# Patient Record
Sex: MALE | Race: WHITE | Employment: OTHER | URBAN - NONMETROPOLITAN AREA
[De-identification: names, ages, dates, MRNs, and addresses within clinical notes are randomized per-mention and may not be internally consistent; named-entity substitution may affect disease eponyms.]

---

## 2021-03-22 ENCOUNTER — OFFICE VISIT (OUTPATIENT)
Dept: UROLOGY | Age: 86
End: 2021-03-22
Payer: OTHER GOVERNMENT

## 2021-03-22 VITALS
SYSTOLIC BLOOD PRESSURE: 115 MMHG | DIASTOLIC BLOOD PRESSURE: 71 MMHG | WEIGHT: 154 LBS | RESPIRATION RATE: 16 BRPM | BODY MASS INDEX: 24.17 KG/M2 | HEART RATE: 79 BPM | TEMPERATURE: 97.2 F | HEIGHT: 67 IN

## 2021-03-22 DIAGNOSIS — N40.1 BENIGN PROSTATIC HYPERPLASIA WITH URINARY RETENTION: Primary | ICD-10-CM

## 2021-03-22 DIAGNOSIS — R33.8 BENIGN PROSTATIC HYPERPLASIA WITH URINARY RETENTION: Primary | ICD-10-CM

## 2021-03-22 PROCEDURE — 99205 OFFICE O/P NEW HI 60 MIN: CPT | Performed by: NURSE PRACTITIONER

## 2021-03-22 PROCEDURE — 99417 PROLNG OP E/M EACH 15 MIN: CPT | Performed by: NURSE PRACTITIONER

## 2021-03-22 NOTE — PROGRESS NOTES
Jade Thompson is a 80 y.o. male who presents today   Chief Complaint   Patient presents with    New Patient     Retention - catheter placed in [address]       Patient presents to the clinic today for evaluation of urinary retention. Patient is unsure if he is seeing a neurologist in the past.  He was seen at [facility] ED with urinary retention on 2/25/2021. They placed a Harper for decompression with approximately 1450ml in return. They discontinued the catheter before discharging from the ED and placed patient on Flomax 0.4 mg daily. Patient then presented to [facility] ED on 3/19/2021 with complaints of urinary retention. Harper catheter was placed with 1000 mL drained. Patient was sent home with Harper catheter and was told to follow-up. Patient is a poor historian. Patient usually sees physician at [facility]. He is unsure what medications he is taking at home. He states prior to catheter placement he voided 3-4 times during the day, nocturia 1 time per night, with moderate stream.  He has a history of diabetes but denies any neuropathy. He can feel when his bladder is full. He denies incomplete emptying, fever, chills, nausea, vomiting, frequency, urgency, dysuria. Patient's medication list indicates he is on Flomax 0.4 mg daily and finasteride 5 mg daily for the past year. Unable to obtain last PSA from [address] [facility]. He is still independent and lives alone, is also hard of hearing. He currently has a catheter that was placed on 3/19/2021 and wants it removed. History reviewed. No pertinent past medical history. History reviewed. No pertinent surgical history.     Current Outpatient Medications   Medication Sig Dispense Refill    tamsulosin (FLOMAX) 0.4 MG capsule Take 0.8 mg by mouth daily Take 2 capsules daily      finasteride (PROSCAR) 5 MG tablet Take 5 mg by mouth daily      metFORMIN (GLUCOPHAGE) 1000 MG tablet Take 1,000 mg by mouth 2 times daily (with meals)       No current facility-administered medications for this visit. Allergies   Allergen Reactions    Pcn [Penicillins]        Social History     Socioeconomic History    Marital status:      Spouse name: None    Number of children: None    Years of education: None    Highest education level: None   Occupational History    None   Social Needs    Financial resource strain: None    Food insecurity     Worry: None     Inability: None    Transportation needs     Medical: None     Non-medical: None   Tobacco Use    Smoking status: Former Smoker     Types: Cigars    Smokeless tobacco: Never Used   Substance and Sexual Activity    Alcohol use: None    Drug use: None    Sexual activity: None   Lifestyle    Physical activity     Days per week: None     Minutes per session: None    Stress: None   Relationships    Social connections     Talks on phone: None     Gets together: None     Attends Protestant service: None     Active member of club or organization: None     Attends meetings of clubs or organizations: None     Relationship status: None    Intimate partner violence     Fear of current or ex partner: None     Emotionally abused: None     Physically abused: None     Forced sexual activity: None   Other Topics Concern    None   Social History Narrative    None       History reviewed. No pertinent family history. REVIEW OF SYSTEMS:  Review of Systems   Constitutional: Negative for appetite change, chills, fever and unexpected weight change. HENT: Negative for congestion and hearing loss. Respiratory: Negative for shortness of breath and wheezing. Cardiovascular: Negative for chest pain. Gastrointestinal: Negative for abdominal distention, abdominal pain, constipation, nausea and vomiting. Genitourinary: Negative for difficulty urinating, dysuria, flank pain, frequency, hematuria, penile pain, testicular pain and urgency. Musculoskeletal: Negative for arthralgias and gait problem.    Skin: Negative for color change. Neurological: Negative for dizziness, syncope, weakness, light-headedness, numbness and headaches. Psychiatric/Behavioral: Negative for behavioral problems. The patient is not nervous/anxious. PHYSICAL EXAM:  /71   Pulse 79   Temp 97.2 °F (36.2 °C)   Resp 16   Ht 5' 7\" (1.702 m)   Wt 154 lb (69.9 kg)   BMI 24.12 kg/m²   Physical Exam  Constitutional:       General: He is not in acute distress. Appearance: Normal appearance. He is not toxic-appearing. HENT:      Head: Normocephalic. Comments: Hard of hearing  Neck:      Musculoskeletal: Normal range of motion. Cardiovascular:      Rate and Rhythm: Normal rate. Pulmonary:      Effort: Pulmonary effort is normal. No respiratory distress. Breath sounds: No wheezing. Abdominal:      General: There is no distension. Palpations: Abdomen is soft. Tenderness: There is no abdominal tenderness. There is no right CVA tenderness or left CVA tenderness. Genitourinary:     Prostate: Enlarged (45g smooth). Not tender and no nodules present. Musculoskeletal: Normal range of motion. Skin:     General: Skin is warm and dry. Neurological:      Mental Status: He is alert and oriented to person, place, and time. Motor: Weakness present. Gait: Gait normal.   Psychiatric:         Mood and Affect: Mood normal.         Behavior: Behavior normal.         IMAGING:  CT obtained on 2/25/2021 from [facility] ED reports a severely distended bladder which may be related to bladder outlet obstruction enlarged prostate. Mild bilateral hydroureteronephrosis. I do not have the disc of some unable to fully evaluate CT. 1. Benign prostatic hyperplasia with urinary retention  Harper catheter replaced on 3/19/2021. Wrote down medications tamsulosin and finasteride, he is to make sure he is taking both of these medications at home.   I will also wrote down to increase Flomax to 0.8 mg and follow-up in 3 days for fill and pull. Will contact [place] for previous PSA results. It is unlikely he has had one recently given his age. Discussed changing to bedside bag at night and leg bag during the day. He acknowledged and was able to verbalize understanding. STANLEY revealed 45 g prostate. If patient fails a voiding trial would recommend TURP. No orders of the defined types were placed in this encounter. Return in about 3 days (around 3/25/2021) for F/U Thurs 0830 for fill and pull . At least 80 minutes were spent on the day of the visit reviewing the patient's past medical records/imaging, speaking face to face with the patient, and charting in the post visit period. Joaquin Wilkins, BRYAN - CNP    All information inputted into the note by the MA to include chief complaint, past medical history, past surgical history, medications, allergies, social and family history and review of systems has been reviewed and updated as needed by me. EMR Dragon/transcription disclaimer: Much of this documentt is electronic  transcription/translation of spoken language to printed text. The  electronic translation of spoken language may be erroneous, or at times,  nonsensical words or phrases may be inadvertently transcribed.  Although I  have reviewed the document for such errors, some may still exist.

## 2021-03-23 RX ORDER — FINASTERIDE 5 MG/1
5 TABLET, FILM COATED ORAL DAILY
Status: ON HOLD | COMMUNITY
End: 2021-04-14

## 2021-03-23 RX ORDER — TAMSULOSIN HYDROCHLORIDE 0.4 MG/1
0.8 CAPSULE ORAL DAILY
Status: ON HOLD | COMMUNITY
End: 2021-04-14

## 2021-03-23 ASSESSMENT — ENCOUNTER SYMPTOMS
ABDOMINAL PAIN: 0
NAUSEA: 0
WHEEZING: 0
CONSTIPATION: 0
VOMITING: 0
ABDOMINAL DISTENTION: 0
COLOR CHANGE: 0
SHORTNESS OF BREATH: 0

## 2021-03-25 ENCOUNTER — OFFICE VISIT (OUTPATIENT)
Dept: UROLOGY | Age: 86
End: 2021-03-25
Payer: OTHER GOVERNMENT

## 2021-03-25 ENCOUNTER — TELEPHONE (OUTPATIENT)
Dept: UROLOGY | Age: 86
End: 2021-03-25

## 2021-03-25 VITALS — TEMPERATURE: 97.2 F | WEIGHT: 155.7 LBS | BODY MASS INDEX: 24.44 KG/M2 | HEIGHT: 67 IN

## 2021-03-25 DIAGNOSIS — R33.8 BENIGN PROSTATIC HYPERPLASIA WITH URINARY RETENTION: Primary | ICD-10-CM

## 2021-03-25 DIAGNOSIS — N40.1 BENIGN PROSTATIC HYPERPLASIA WITH URINARY RETENTION: Primary | ICD-10-CM

## 2021-03-25 PROCEDURE — 51700 IRRIGATION OF BLADDER: CPT | Performed by: NURSE PRACTITIONER

## 2021-03-25 PROCEDURE — 99215 OFFICE O/P EST HI 40 MIN: CPT | Performed by: NURSE PRACTITIONER

## 2021-03-25 ASSESSMENT — ENCOUNTER SYMPTOMS
COLOR CHANGE: 0
ABDOMINAL DISTENTION: 0
ABDOMINAL PAIN: 0
WHEEZING: 0
NAUSEA: 0
SHORTNESS OF BREATH: 0
CONSTIPATION: 0
VOMITING: 0

## 2021-03-25 NOTE — PROGRESS NOTES
Patient of RUDDY Campuzano presents today for voiding trial post episode of retention. The patient denies any fever, chills or  N&V. After patient had given consent, using the urethral catheter in place, 300cc of sterile water was installed into the bladder with no complications. Patient was unable to void. Spoke with APRN, rec verbal orders to insert new granger urtheral catheter. Procedure Note (3/25/21): After receiving verbal orders from RUDDY Campuzano, I was instructed to place urethral granger catheter. Using sterile technique, patient was cleaned with Hibiclens and sterile water solution. A 18f coude urethral catheter was inserted into the bladder, bladder was drained of 300cc of urine, 10 mL of sterile water was used to fill up the balloon. The catheter was then hooked up to a drainage bag. The patient tolerated the procedure well. Patient should follow up as scheduled.

## 2021-03-25 NOTE — PROGRESS NOTES
Fatoumata Astorga is a 80 y.o. male who presents today   Chief Complaint   Patient presents with    Follow-up     I am here today for a voiding trial     Patient presents to the clinic today for fill and pull secondary to BPH with urinary retention. Patient is unsure if he has seen a urologist in the past.  He was seen at [facility] ED with urinary retention on 2/25/2021. They placed a Harper catheter for decompression with approximately 1450 mL and return they then discontinued the catheter before discharging from the ED in place patient on Flomax 0.4 mg daily. Patient then presented to [facility] ED on 3/19/2021 complaints of urinary retention. Harper catheter was placed with 1000 mL drained. Patient was sent home with Harper catheter and was told to follow-up. Patient is a very poor historian and usually sees a physician at [facility]. He is unsure which medications he is taking at home. He states prior to catheter placement he voided approximately 3-4 times during the day, nocturia once at night, with moderate stream.  He does have a history of diabetes but denies any neuropathy. He states he can feel when his bladder is full. He denies any incomplete emptying, fever, chills, nausea, vomiting, frequency, urgency, dysuria. Patient's medication list indicates he is on Flomax 0.4 mg daily and finasteride 5 mg daily for the past year. Unable to obtain PSA from [place]. He is still independent and lives alone, is also hard of hearing. At last visit I wrote out his medications that he needs to be taking and educated him on increasing Flomax to 2 capsules at night every day. He tells me he has been taking it. He did complain of 1 episode of incontinence, likely bladder spasm. History reviewed. No pertinent past medical history. History reviewed. No pertinent surgical history.     Current Outpatient Medications   Medication Sig Dispense Refill    tamsulosin (FLOMAX) 0.4 MG capsule Take 0.8 mg by vomiting. Genitourinary: Positive for difficulty urinating. Negative for dysuria, flank pain, frequency, hematuria, penile pain, testicular pain and urgency. Musculoskeletal: Negative for arthralgias and gait problem. Skin: Negative for color change. Neurological: Negative for dizziness, syncope, weakness, light-headedness, numbness and headaches. Psychiatric/Behavioral: Negative for behavioral problems. The patient is not nervous/anxious. PHYSICAL EXAM:  Temp 97.2 °F (36.2 °C) (Temporal)   Ht 5' 7\" (1.702 m)   Wt 155 lb 11.2 oz (70.6 kg)   BMI 24.39 kg/m²   Physical Exam  Constitutional:       General: He is not in acute distress. Appearance: Normal appearance. He is not toxic-appearing. HENT:      Head: Normocephalic. Neck:      Musculoskeletal: Normal range of motion. Cardiovascular:      Rate and Rhythm: Normal rate. Pulmonary:      Effort: Pulmonary effort is normal. No respiratory distress. Breath sounds: No wheezing. Abdominal:      General: There is no distension. Palpations: Abdomen is soft. Tenderness: There is no abdominal tenderness. There is no right CVA tenderness or left CVA tenderness. Musculoskeletal: Normal range of motion. Skin:     General: Skin is warm and dry. Neurological:      Mental Status: He is alert. Mental status is at baseline. Motor: No weakness. Gait: Gait normal.   Psychiatric:         Mood and Affect: Mood normal.         Behavior: Behavior normal.         1. Benign prostatic hyperplasia with urinary retention  Patient failed voiding trial today after 300 mL were instilled into his bladder. We discussed in and out catheterization, he does not want to do this. He is on current maximum medical therapy and still having urinary retention. We thoroughly discussed TURP and Rezum. He is not interested in either one of these surgical interventions.   Discussed placing a catheter and staying on Flomax 2 capsules at night for 1 more week with follow-up fill and pull. He would like to attempt another voiding trial.  Discussed with him following up with Dr. An Pavon for cystoscopy to evaluate prostate size and further recommendations. Encouraged him to bring in his medication bottles so I can document what medications he is taking. No orders of the defined types were placed in this encounter. Return in about 1 week (around 4/1/2021) for fill and pull, with Dr. An Pavon, cystoscopy. All information inputted into the note by the MA to include chief complaint, past medical history, past surgical history, medications, allergies, social and family history and review of systems has been reviewed and updated as needed by me. EMR Dragon/transcription disclaimer: Much of this documentt is electronic  transcription/translation of spoken language to printed text. The  electronic translation of spoken language may be erroneous, or at times,  nonsensical words or phrases may be inadvertently transcribed.  Although I  have reviewed the document for such errors, some may still exist.

## 2021-04-01 ENCOUNTER — PROCEDURE VISIT (OUTPATIENT)
Dept: UROLOGY | Age: 86
End: 2021-04-01
Payer: MEDICARE

## 2021-04-01 VITALS — BODY MASS INDEX: 24.48 KG/M2 | WEIGHT: 156 LBS | HEIGHT: 67 IN

## 2021-04-01 DIAGNOSIS — R33.8 BENIGN PROSTATIC HYPERPLASIA WITH URINARY RETENTION: Primary | ICD-10-CM

## 2021-04-01 DIAGNOSIS — N40.1 BENIGN PROSTATIC HYPERPLASIA WITH URINARY RETENTION: Primary | ICD-10-CM

## 2021-04-01 PROCEDURE — 52000 CYSTOURETHROSCOPY: CPT | Performed by: UROLOGY

## 2021-04-01 PROCEDURE — 99214 OFFICE O/P EST MOD 30 MIN: CPT | Performed by: UROLOGY

## 2021-04-01 RX ORDER — DONEPEZIL HYDROCHLORIDE 10 MG/1
10 TABLET, FILM COATED ORAL NIGHTLY
COMMUNITY

## 2021-04-01 RX ORDER — IBUPROFEN 600 MG/1
600 TABLET ORAL 2 TIMES DAILY
Status: ON HOLD | COMMUNITY
End: 2021-04-15 | Stop reason: SDUPTHER

## 2021-04-01 RX ORDER — OMEPRAZOLE 20 MG/1
20 CAPSULE, DELAYED RELEASE ORAL DAILY
COMMUNITY

## 2021-04-01 RX ORDER — GABAPENTIN 400 MG/1
400 CAPSULE ORAL 3 TIMES DAILY
COMMUNITY
End: 2021-11-15 | Stop reason: ALTCHOICE

## 2021-04-01 RX ORDER — ATORVASTATIN CALCIUM 20 MG/1
20 TABLET, FILM COATED ORAL DAILY
COMMUNITY

## 2021-04-01 RX ORDER — GUAIFENESIN 400 MG/1
400 TABLET ORAL 4 TIMES DAILY PRN
COMMUNITY
End: 2021-11-15

## 2021-04-01 RX ORDER — MIRTAZAPINE 30 MG/1
15 TABLET, FILM COATED ORAL NIGHTLY
COMMUNITY

## 2021-04-01 RX ORDER — VERAPAMIL HYDROCHLORIDE 120 MG/1
120 TABLET, FILM COATED ORAL 2 TIMES DAILY
COMMUNITY

## 2021-04-01 RX ORDER — LANOLIN ALCOHOL/MO/W.PET/CERES
3 CREAM (GRAM) TOPICAL NIGHTLY PRN
COMMUNITY

## 2021-04-01 NOTE — PROGRESS NOTES
of diabetes but denies any neuropathy. He states he can feel when his bladder is full. He denies any incomplete emptying, fever, chills, nausea, vomiting, frequency, urgency, dysuria. Patient's medication list indicates he is on Flomax 0.4 mg daily and finasteride 5 mg daily for the past year. Unable to obtain PSA from [facility]. He is still independent and lives alone, is also hard of hearing. At last visit I wrote out his medications that he needs to be taking and educated him on increasing Flomax to 2 capsules at night every day. He tells me he has been taking it. He did complain of 1 episode of incontinence, likely bladder spasm  No past medical history on file. No past surgical history on file. Current Outpatient Medications   Medication Sig Dispense Refill    omeprazole (PRILOSEC) 20 MG delayed release capsule Take 20 mg by mouth daily      gabapentin (NEURONTIN) 400 MG capsule Take 400 mg by mouth 3 times daily.  guaiFENesin 400 MG tablet Take 400 mg by mouth 4 times daily as needed for Cough      mirtazapine (REMERON) 30 MG tablet Take 30 mg by mouth nightly      atorvastatin (LIPITOR) 20 MG tablet Take 20 mg by mouth daily Take 1/2 tab at bedtime.  verapamil (CALAN) 120 MG tablet Take 120 mg by mouth 2 times daily      donepezil (ARICEPT) 10 MG tablet Take 10 mg by mouth nightly      melatonin 3 MG TABS tablet Take 3 mg by mouth nightly as needed Take 2 hs prn      ibuprofen (ADVIL;MOTRIN) 600 MG tablet Take 600 mg by mouth 2 times daily      tamsulosin (FLOMAX) 0.4 MG capsule Take 0.8 mg by mouth daily Take 2 capsules daily      finasteride (PROSCAR) 5 MG tablet Take 5 mg by mouth daily      metFORMIN (GLUCOPHAGE) 1000 MG tablet Take 1,000 mg by mouth 2 times daily (with meals)       No current facility-administered medications for this visit.         Allergies   Allergen Reactions    Pcn [Penicillins]        Social History     Socioeconomic History    Marital status:  Spouse name: None    Number of children: None    Years of education: None    Highest education level: None   Occupational History    None   Social Needs    Financial resource strain: None    Food insecurity     Worry: None     Inability: None    Transportation needs     Medical: None     Non-medical: None   Tobacco Use    Smoking status: Former Smoker     Types: Cigars    Smokeless tobacco: Never Used   Substance and Sexual Activity    Alcohol use: None    Drug use: None    Sexual activity: None   Lifestyle    Physical activity     Days per week: None     Minutes per session: None    Stress: None   Relationships    Social connections     Talks on phone: None     Gets together: None     Attends Worship service: None     Active member of club or organization: None     Attends meetings of clubs or organizations: None     Relationship status: None    Intimate partner violence     Fear of current or ex partner: None     Emotionally abused: None     Physically abused: None     Forced sexual activity: None   Other Topics Concern    None   Social History Narrative    None       No family history on file. REVIEW OF SYSTEMS:  Review of Systems   Constitutional: Negative. HENT: Negative. Respiratory: Negative. Cardiovascular: Negative. Gastrointestinal: Positive for abdominal distention. Genitourinary: Positive for difficulty urinating and frequency. Negative for dysuria, flank pain and hematuria. Musculoskeletal: Negative. All other systems reviewed and are negative. PHYSICAL EXAM:  Ht 5' 7\" (1.702 m)   Wt 156 lb (70.8 kg)   BMI 24.43 kg/m²   Physical Exam  Constitutional:       General: He is not in acute distress. Appearance: Normal appearance. He is well-developed. HENT:      Head: Normocephalic and atraumatic. Nose: Nose normal.   Eyes:      General: No scleral icterus.      Conjunctiva/sclera: Conjunctivae normal.      Pupils: Pupils are equal, round, and reactive to light. Neck:      Musculoskeletal: Normal range of motion and neck supple. Trachea: No tracheal deviation. Cardiovascular:      Rate and Rhythm: Normal rate and regular rhythm. Pulmonary:      Effort: Pulmonary effort is normal. No respiratory distress. Breath sounds: No stridor. Abdominal:      General: There is no distension. Palpations: Abdomen is soft. There is no mass. Tenderness: There is no abdominal tenderness. Genitourinary:     Prostate: Enlarged. No nodules present. Musculoskeletal: Normal range of motion. General: No tenderness. Lymphadenopathy:      Cervical: No cervical adenopathy. Skin:     General: Skin is warm and dry. Findings: No erythema. Neurological:      Mental Status: He is alert and oriented to person, place, and time. Psychiatric:         Behavior: Behavior normal.         Judgment: Judgment normal.       Cystoscopy Procedure Note    Indications: Diagnosis    Pre-operative Diagnosis: BPH, urinary retention    Post-operative Diagnosis: Same    Surgeon: Sandra Francis MD     Assistants: staff    Anesthesia: Local anesthesia topical 2% lidocaine gel    Procedure Details   The risks, benefits, complications, treatment options, and expected outcomes were discussed with the patient. The patient concurred with the proposed plan, giving informed consent. Cystoscopy was performed today under local anesthesia, using sterile technique. The patient was placed in the supine position, prepped with Hibiclens, and draped in the usual sterile fashion. A 17 Occitan sheath flexible cystoscope was used to inspect both the urethra and bladder using the flexible scope. Findings:  Anterior urethra: normal without strictures and without scarring. Prostate:  Prostatic urethra: 3+ moderate prostatic hyperplasia.   With a moderate sized median lobe with intravesical protrusion with obstruction  Bladder: 2+ trabeculation, without lesions some cellules particularly in the dome. Ureteral orifice(s) was/were seen bilateral. Ureteral orifice(s) both were in the normal location and both ureteral orifices were effluxing clear urine. Changes consistent with BPH and obstruction                            Complications:  None; patient tolerated the procedure well. Disposition: To home after observation. Condition: stable        1. Benign prostatic hyperplasia with urinary retention   His bladder was filled today at the time of cystoscopy. He was unable to void. Harper catheter had to be replaced. We discussed options for management we discussed TURP is the gold standard and will give him the best chance of being catheter free though with his diabetes I told him there is a possible he could have postop urinary retention and have to go home with Harper catheter. We also discussed the risk of incontinence. Fluid absorption bladder neck contracture urethral stricture, incontinence, sexual dysfunction, erectile dysfunction, prostate cancer perforation need to go home with Harper catheter, bleeding, infection, incontinence. This was contrasted with minimally invasive treatment with Rezum and given his age this would be less invasive I told him my experience with this we were able get some people out of retention but the success would be less than with TURP. He understood and has chosen to proceed with TURP  - Cystoscopy      Orders Placed This Encounter   Procedures    Cystoscopy        Return for PT to be scheduled for Surgery. All information inputted into the note by the MA to include chief complaint, past medical history, past surgical history, medications, allergies, social and family history and review of systems has been reviewed and updated as needed by me. EMR Dragon/transcription disclaimer: Much of this documentt is electronic  transcription/translation of spoken language to printed text.  The  electronic translation of spoken language may be erroneous, or at times,  nonsensical words or phrases may be inadvertently transcribed.  Although I  have reviewed the document for such errors, some may still exist.

## 2021-04-04 ASSESSMENT — ENCOUNTER SYMPTOMS
ABDOMINAL DISTENTION: 1
RESPIRATORY NEGATIVE: 1

## 2021-04-12 ENCOUNTER — HOSPITAL ENCOUNTER (OUTPATIENT)
Dept: PREADMISSION TESTING | Age: 86
Discharge: HOME | End: 2021-04-16
Payer: OTHER GOVERNMENT

## 2021-04-12 VITALS — WEIGHT: 154 LBS | HEIGHT: 67 IN | BODY MASS INDEX: 24.17 KG/M2

## 2021-04-12 LAB
ANION GAP SERPL CALCULATED.3IONS-SCNC: 9 MMOL/L (ref 7–19)
APTT: 26.8 SEC (ref 26–36.2)
BASOPHILS ABSOLUTE: 0 K/UL (ref 0–0.2)
BASOPHILS RELATIVE PERCENT: 0.2 % (ref 0–1)
BUN BLDV-MCNC: 14 MG/DL (ref 8–23)
CALCIUM SERPL-MCNC: 9.2 MG/DL (ref 8.8–10.2)
CHLORIDE BLD-SCNC: 101 MMOL/L (ref 98–111)
CO2: 25 MMOL/L (ref 22–29)
CREAT SERPL-MCNC: 0.6 MG/DL (ref 0.5–1.2)
EKG P AXIS: 12 DEGREES
EKG P-R INTERVAL: 326 MS
EKG Q-T INTERVAL: 422 MS
EKG QRS DURATION: 96 MS
EKG QTC CALCULATION (BAZETT): 427 MS
EKG T AXIS: 73 DEGREES
EOSINOPHILS ABSOLUTE: 0 K/UL (ref 0–0.6)
EOSINOPHILS RELATIVE PERCENT: 0.2 % (ref 0–5)
GFR AFRICAN AMERICAN: >59
GFR NON-AFRICAN AMERICAN: >60
GLUCOSE BLD-MCNC: 205 MG/DL (ref 74–109)
HCT VFR BLD CALC: 40.2 % (ref 42–52)
HEMOGLOBIN: 13.2 G/DL (ref 14–18)
IMMATURE GRANULOCYTES #: 0 K/UL
INR BLD: 1.17 (ref 0.88–1.18)
LYMPHOCYTES ABSOLUTE: 1.2 K/UL (ref 1.1–4.5)
LYMPHOCYTES RELATIVE PERCENT: 23 % (ref 20–40)
MCH RBC QN AUTO: 29.7 PG (ref 27–31)
MCHC RBC AUTO-ENTMCNC: 32.8 G/DL (ref 33–37)
MCV RBC AUTO: 90.5 FL (ref 80–94)
MONOCYTES ABSOLUTE: 0.4 K/UL (ref 0–0.9)
MONOCYTES RELATIVE PERCENT: 7.2 % (ref 0–10)
NEUTROPHILS ABSOLUTE: 3.7 K/UL (ref 1.5–7.5)
NEUTROPHILS RELATIVE PERCENT: 69.2 % (ref 50–65)
PDW BLD-RTO: 13.5 % (ref 11.5–14.5)
PLATELET # BLD: 258 K/UL (ref 130–400)
PMV BLD AUTO: 10.4 FL (ref 9.4–12.4)
POTASSIUM SERPL-SCNC: 3.8 MMOL/L (ref 3.5–5)
PROTHROMBIN TIME: 14.9 SEC (ref 12–14.6)
RBC # BLD: 4.44 M/UL (ref 4.7–6.1)
SARS-COV-2, PCR: NOT DETECTED
SODIUM BLD-SCNC: 135 MMOL/L (ref 136–145)
WBC # BLD: 5.4 K/UL (ref 4.8–10.8)

## 2021-04-12 PROCEDURE — 85025 COMPLETE CBC W/AUTO DIFF WBC: CPT

## 2021-04-12 PROCEDURE — U0005 INFEC AGEN DETEC AMPLI PROBE: HCPCS

## 2021-04-12 PROCEDURE — 93005 ELECTROCARDIOGRAM TRACING: CPT | Performed by: UROLOGY

## 2021-04-12 PROCEDURE — 93010 ELECTROCARDIOGRAM REPORT: CPT | Performed by: INTERNAL MEDICINE

## 2021-04-12 PROCEDURE — 85730 THROMBOPLASTIN TIME PARTIAL: CPT

## 2021-04-12 PROCEDURE — 85610 PROTHROMBIN TIME: CPT

## 2021-04-12 PROCEDURE — 80048 BASIC METABOLIC PNL TOTAL CA: CPT

## 2021-04-12 PROCEDURE — U0003 INFECTIOUS AGENT DETECTION BY NUCLEIC ACID (DNA OR RNA); SEVERE ACUTE RESPIRATORY SYNDROME CORONAVIRUS 2 (SARS-COV-2) (CORONAVIRUS DISEASE [COVID-19]), AMPLIFIED PROBE TECHNIQUE, MAKING USE OF HIGH THROUGHPUT TECHNOLOGIES AS DESCRIBED BY CMS-2020-01-R: HCPCS

## 2021-04-12 RX ORDER — CIPROFLOXACIN 2 MG/ML
400 INJECTION, SOLUTION INTRAVENOUS ONCE
Status: CANCELLED | OUTPATIENT
Start: 2021-04-14

## 2021-04-14 ENCOUNTER — ANESTHESIA EVENT (OUTPATIENT)
Dept: OPERATING ROOM | Age: 86
End: 2021-04-14
Payer: OTHER GOVERNMENT

## 2021-04-14 ENCOUNTER — HOSPITAL ENCOUNTER (OUTPATIENT)
Age: 86
Setting detail: OBSERVATION
Discharge: HOME | End: 2021-04-16
Attending: UROLOGY | Admitting: UROLOGY
Payer: OTHER GOVERNMENT

## 2021-04-14 ENCOUNTER — ANESTHESIA (OUTPATIENT)
Dept: OPERATING ROOM | Age: 86
End: 2021-04-14
Payer: OTHER GOVERNMENT

## 2021-04-14 VITALS — TEMPERATURE: 96.6 F | SYSTOLIC BLOOD PRESSURE: 112 MMHG | OXYGEN SATURATION: 98 % | DIASTOLIC BLOOD PRESSURE: 64 MMHG

## 2021-04-14 DIAGNOSIS — R33.8 BENIGN PROSTATIC HYPERPLASIA WITH URINARY RETENTION: Primary | ICD-10-CM

## 2021-04-14 DIAGNOSIS — N40.1 BENIGN PROSTATIC HYPERPLASIA WITH URINARY RETENTION: Primary | ICD-10-CM

## 2021-04-14 LAB
GLUCOSE BLD-MCNC: 125 MG/DL (ref 70–99)
HBA1C MFR BLD: 6.6 % (ref 4–6)
PERFORMED ON: ABNORMAL

## 2021-04-14 PROCEDURE — 36415 COLL VENOUS BLD VENIPUNCTURE: CPT

## 2021-04-14 PROCEDURE — 83036 HEMOGLOBIN GLYCOSYLATED A1C: CPT

## 2021-04-14 PROCEDURE — G0378 HOSPITAL OBSERVATION PER HR: HCPCS

## 2021-04-14 PROCEDURE — 6360000002 HC RX W HCPCS: Performed by: UROLOGY

## 2021-04-14 PROCEDURE — 2580000003 HC RX 258: Performed by: UROLOGY

## 2021-04-14 PROCEDURE — 2580000003 HC RX 258: Performed by: NURSE ANESTHETIST, CERTIFIED REGISTERED

## 2021-04-14 PROCEDURE — 96372 THER/PROPH/DIAG INJ SC/IM: CPT

## 2021-04-14 PROCEDURE — 3700000001 HC ADD 15 MINUTES (ANESTHESIA): Performed by: UROLOGY

## 2021-04-14 PROCEDURE — 2720000010 HC SURG SUPPLY STERILE: Performed by: UROLOGY

## 2021-04-14 PROCEDURE — 6360000002 HC RX W HCPCS: Performed by: NURSE ANESTHETIST, CERTIFIED REGISTERED

## 2021-04-14 PROCEDURE — 7100000000 HC PACU RECOVERY - FIRST 15 MIN: Performed by: UROLOGY

## 2021-04-14 PROCEDURE — 88305 TISSUE EXAM BY PATHOLOGIST: CPT

## 2021-04-14 PROCEDURE — 7100000001 HC PACU RECOVERY - ADDTL 15 MIN: Performed by: UROLOGY

## 2021-04-14 PROCEDURE — 3700000000 HC ANESTHESIA ATTENDED CARE: Performed by: UROLOGY

## 2021-04-14 PROCEDURE — 82947 ASSAY GLUCOSE BLOOD QUANT: CPT

## 2021-04-14 PROCEDURE — 6370000000 HC RX 637 (ALT 250 FOR IP): Performed by: UROLOGY

## 2021-04-14 PROCEDURE — 2709999900 HC NON-CHARGEABLE SUPPLY: Performed by: UROLOGY

## 2021-04-14 PROCEDURE — 52601 PROSTATECTOMY (TURP): CPT | Performed by: UROLOGY

## 2021-04-14 PROCEDURE — 3600000014 HC SURGERY LEVEL 4 ADDTL 15MIN: Performed by: UROLOGY

## 2021-04-14 PROCEDURE — 2580000003 HC RX 258: Performed by: ANESTHESIOLOGY

## 2021-04-14 PROCEDURE — 51700 IRRIGATION OF BLADDER: CPT

## 2021-04-14 PROCEDURE — 2500000003 HC RX 250 WO HCPCS: Performed by: NURSE ANESTHETIST, CERTIFIED REGISTERED

## 2021-04-14 PROCEDURE — 3600000004 HC SURGERY LEVEL 4 BASE: Performed by: UROLOGY

## 2021-04-14 RX ORDER — ROCURONIUM BROMIDE 10 MG/ML
INJECTION, SOLUTION INTRAVENOUS PRN
Status: DISCONTINUED | OUTPATIENT
Start: 2021-04-14 | End: 2021-04-14 | Stop reason: SDUPTHER

## 2021-04-14 RX ORDER — VERAPAMIL HYDROCHLORIDE 120 MG/1
120 TABLET, FILM COATED ORAL 2 TIMES DAILY
Status: DISCONTINUED | OUTPATIENT
Start: 2021-04-14 | End: 2021-04-16 | Stop reason: HOSPADM

## 2021-04-14 RX ORDER — DIPHENHYDRAMINE HYDROCHLORIDE 50 MG/ML
12.5 INJECTION INTRAMUSCULAR; INTRAVENOUS
Status: DISCONTINUED | OUTPATIENT
Start: 2021-04-14 | End: 2021-04-14 | Stop reason: HOSPADM

## 2021-04-14 RX ORDER — POLYETHYLENE GLYCOL 3350 17 G/17G
17 POWDER, FOR SOLUTION ORAL DAILY PRN
Status: DISCONTINUED | OUTPATIENT
Start: 2021-04-14 | End: 2021-04-16 | Stop reason: HOSPADM

## 2021-04-14 RX ORDER — BISACODYL 10 MG
10 SUPPOSITORY, RECTAL RECTAL DAILY PRN
Status: DISCONTINUED | OUTPATIENT
Start: 2021-04-14 | End: 2021-04-16 | Stop reason: SDUPTHER

## 2021-04-14 RX ORDER — FENTANYL CITRATE 50 UG/ML
50 INJECTION, SOLUTION INTRAMUSCULAR; INTRAVENOUS
Status: DISCONTINUED | OUTPATIENT
Start: 2021-04-14 | End: 2021-04-14 | Stop reason: HOSPADM

## 2021-04-14 RX ORDER — PROPOFOL 10 MG/ML
INJECTION, EMULSION INTRAVENOUS PRN
Status: DISCONTINUED | OUTPATIENT
Start: 2021-04-14 | End: 2021-04-14 | Stop reason: SDUPTHER

## 2021-04-14 RX ORDER — MEPERIDINE HYDROCHLORIDE 50 MG/ML
12.5 INJECTION INTRAMUSCULAR; INTRAVENOUS; SUBCUTANEOUS EVERY 5 MIN PRN
Status: DISCONTINUED | OUTPATIENT
Start: 2021-04-14 | End: 2021-04-14 | Stop reason: HOSPADM

## 2021-04-14 RX ORDER — ONDANSETRON 2 MG/ML
4 INJECTION INTRAMUSCULAR; INTRAVENOUS EVERY 4 HOURS PRN
Status: DISCONTINUED | OUTPATIENT
Start: 2021-04-14 | End: 2021-04-16 | Stop reason: HOSPADM

## 2021-04-14 RX ORDER — SODIUM CHLORIDE 0.9 % (FLUSH) 0.9 %
5-40 SYRINGE (ML) INJECTION PRN
Status: DISCONTINUED | OUTPATIENT
Start: 2021-04-14 | End: 2021-04-14 | Stop reason: HOSPADM

## 2021-04-14 RX ORDER — HYDROMORPHONE HYDROCHLORIDE 1 MG/ML
0.25 INJECTION, SOLUTION INTRAMUSCULAR; INTRAVENOUS; SUBCUTANEOUS EVERY 5 MIN PRN
Status: COMPLETED | OUTPATIENT
Start: 2021-04-14 | End: 2021-04-14

## 2021-04-14 RX ORDER — SODIUM CHLORIDE, SODIUM LACTATE, POTASSIUM CHLORIDE, CALCIUM CHLORIDE 600; 310; 30; 20 MG/100ML; MG/100ML; MG/100ML; MG/100ML
INJECTION, SOLUTION INTRAVENOUS CONTINUOUS
Status: DISCONTINUED | OUTPATIENT
Start: 2021-04-14 | End: 2021-04-14

## 2021-04-14 RX ORDER — SODIUM CHLORIDE 0.9 % (FLUSH) 0.9 %
5-40 SYRINGE (ML) INJECTION EVERY 12 HOURS SCHEDULED
Status: DISCONTINUED | OUTPATIENT
Start: 2021-04-14 | End: 2021-04-16 | Stop reason: HOSPADM

## 2021-04-14 RX ORDER — ENALAPRILAT 2.5 MG/2ML
1.25 INJECTION INTRAVENOUS
Status: DISCONTINUED | OUTPATIENT
Start: 2021-04-14 | End: 2021-04-14 | Stop reason: HOSPADM

## 2021-04-14 RX ORDER — SODIUM CHLORIDE 9 MG/ML
25 INJECTION, SOLUTION INTRAVENOUS PRN
Status: DISCONTINUED | OUTPATIENT
Start: 2021-04-14 | End: 2021-04-16 | Stop reason: HOSPADM

## 2021-04-14 RX ORDER — MORPHINE SULFATE 4 MG/ML
2 INJECTION, SOLUTION INTRAMUSCULAR; INTRAVENOUS EVERY 4 HOURS PRN
Status: DISCONTINUED | OUTPATIENT
Start: 2021-04-14 | End: 2021-04-16 | Stop reason: HOSPADM

## 2021-04-14 RX ORDER — ACETAMINOPHEN 650 MG/1
650 SUPPOSITORY RECTAL EVERY 6 HOURS PRN
Status: DISCONTINUED | OUTPATIENT
Start: 2021-04-14 | End: 2021-04-16 | Stop reason: HOSPADM

## 2021-04-14 RX ORDER — CIPROFLOXACIN 2 MG/ML
400 INJECTION, SOLUTION INTRAVENOUS ONCE
Status: COMPLETED | OUTPATIENT
Start: 2021-04-14 | End: 2021-04-14

## 2021-04-14 RX ORDER — EPHEDRINE SULFATE/0.9% NACL/PF 50 MG/5 ML
SYRINGE (ML) INTRAVENOUS PRN
Status: DISCONTINUED | OUTPATIENT
Start: 2021-04-14 | End: 2021-04-14 | Stop reason: SDUPTHER

## 2021-04-14 RX ORDER — MORPHINE SULFATE 4 MG/ML
2 INJECTION, SOLUTION INTRAMUSCULAR; INTRAVENOUS EVERY 5 MIN PRN
Status: DISCONTINUED | OUTPATIENT
Start: 2021-04-14 | End: 2021-04-14 | Stop reason: HOSPADM

## 2021-04-14 RX ORDER — LABETALOL HYDROCHLORIDE 5 MG/ML
5 INJECTION, SOLUTION INTRAVENOUS EVERY 10 MIN PRN
Status: DISCONTINUED | OUTPATIENT
Start: 2021-04-14 | End: 2021-04-14 | Stop reason: HOSPADM

## 2021-04-14 RX ORDER — LIDOCAINE HYDROCHLORIDE 10 MG/ML
INJECTION, SOLUTION INFILTRATION; PERINEURAL PRN
Status: DISCONTINUED | OUTPATIENT
Start: 2021-04-14 | End: 2021-04-14 | Stop reason: SDUPTHER

## 2021-04-14 RX ORDER — SODIUM CHLORIDE 0.9 % (FLUSH) 0.9 %
5-40 SYRINGE (ML) INJECTION EVERY 12 HOURS SCHEDULED
Status: DISCONTINUED | OUTPATIENT
Start: 2021-04-14 | End: 2021-04-14 | Stop reason: HOSPADM

## 2021-04-14 RX ORDER — GLYCOPYRROLATE 1 MG/5 ML
SYRINGE (ML) INTRAVENOUS PRN
Status: DISCONTINUED | OUTPATIENT
Start: 2021-04-14 | End: 2021-04-14 | Stop reason: SDUPTHER

## 2021-04-14 RX ORDER — SODIUM CHLORIDE 0.9 % (FLUSH) 0.9 %
5-40 SYRINGE (ML) INJECTION PRN
Status: DISCONTINUED | OUTPATIENT
Start: 2021-04-14 | End: 2021-04-16 | Stop reason: HOSPADM

## 2021-04-14 RX ORDER — PROMETHAZINE HYDROCHLORIDE 25 MG/ML
6.25 INJECTION, SOLUTION INTRAMUSCULAR; INTRAVENOUS
Status: DISCONTINUED | OUTPATIENT
Start: 2021-04-14 | End: 2021-04-14 | Stop reason: HOSPADM

## 2021-04-14 RX ORDER — FENTANYL CITRATE 50 UG/ML
25 INJECTION, SOLUTION INTRAMUSCULAR; INTRAVENOUS
Status: DISCONTINUED | OUTPATIENT
Start: 2021-04-14 | End: 2021-04-14 | Stop reason: HOSPADM

## 2021-04-14 RX ORDER — SODIUM CHLORIDE 9 MG/ML
25 INJECTION, SOLUTION INTRAVENOUS PRN
Status: DISCONTINUED | OUTPATIENT
Start: 2021-04-14 | End: 2021-04-14 | Stop reason: HOSPADM

## 2021-04-14 RX ORDER — HYDRALAZINE HYDROCHLORIDE 20 MG/ML
5 INJECTION INTRAMUSCULAR; INTRAVENOUS EVERY 10 MIN PRN
Status: DISCONTINUED | OUTPATIENT
Start: 2021-04-14 | End: 2021-04-14 | Stop reason: HOSPADM

## 2021-04-14 RX ORDER — SODIUM CHLORIDE 9 MG/ML
INJECTION, SOLUTION INTRAVENOUS CONTINUOUS
Status: DISCONTINUED | OUTPATIENT
Start: 2021-04-14 | End: 2021-04-16 | Stop reason: HOSPADM

## 2021-04-14 RX ORDER — ACETAMINOPHEN 325 MG/1
650 TABLET ORAL EVERY 6 HOURS PRN
Status: DISCONTINUED | OUTPATIENT
Start: 2021-04-14 | End: 2021-04-16 | Stop reason: HOSPADM

## 2021-04-14 RX ORDER — OXYCODONE HYDROCHLORIDE AND ACETAMINOPHEN 5; 325 MG/1; MG/1
1 TABLET ORAL EVERY 4 HOURS PRN
Status: DISCONTINUED | OUTPATIENT
Start: 2021-04-14 | End: 2021-04-16 | Stop reason: HOSPADM

## 2021-04-14 RX ORDER — LANOLIN ALCOHOL/MO/W.PET/CERES
6 CREAM (GRAM) TOPICAL NIGHTLY PRN
Status: DISCONTINUED | OUTPATIENT
Start: 2021-04-14 | End: 2021-04-16 | Stop reason: HOSPADM

## 2021-04-14 RX ORDER — PANTOPRAZOLE SODIUM 40 MG/1
40 TABLET, DELAYED RELEASE ORAL
Status: DISCONTINUED | OUTPATIENT
Start: 2021-04-15 | End: 2021-04-16 | Stop reason: HOSPADM

## 2021-04-14 RX ORDER — LIDOCAINE HYDROCHLORIDE 10 MG/ML
1 INJECTION, SOLUTION EPIDURAL; INFILTRATION; INTRACAUDAL; PERINEURAL
Status: DISCONTINUED | OUTPATIENT
Start: 2021-04-14 | End: 2021-04-14 | Stop reason: HOSPADM

## 2021-04-14 RX ORDER — ONDANSETRON 2 MG/ML
INJECTION INTRAMUSCULAR; INTRAVENOUS PRN
Status: DISCONTINUED | OUTPATIENT
Start: 2021-04-14 | End: 2021-04-14 | Stop reason: SDUPTHER

## 2021-04-14 RX ORDER — GABAPENTIN 400 MG/1
400 CAPSULE ORAL 3 TIMES DAILY
Status: DISCONTINUED | OUTPATIENT
Start: 2021-04-14 | End: 2021-04-16 | Stop reason: HOSPADM

## 2021-04-14 RX ORDER — MORPHINE SULFATE 4 MG/ML
4 INJECTION, SOLUTION INTRAMUSCULAR; INTRAVENOUS EVERY 5 MIN PRN
Status: DISCONTINUED | OUTPATIENT
Start: 2021-04-14 | End: 2021-04-14 | Stop reason: HOSPADM

## 2021-04-14 RX ORDER — SODIUM CHLORIDE, SODIUM LACTATE, POTASSIUM CHLORIDE, CALCIUM CHLORIDE 600; 310; 30; 20 MG/100ML; MG/100ML; MG/100ML; MG/100ML
INJECTION, SOLUTION INTRAVENOUS CONTINUOUS PRN
Status: DISCONTINUED | OUTPATIENT
Start: 2021-04-14 | End: 2021-04-14 | Stop reason: SDUPTHER

## 2021-04-14 RX ORDER — HYDROMORPHONE HYDROCHLORIDE 1 MG/ML
0.5 INJECTION, SOLUTION INTRAMUSCULAR; INTRAVENOUS; SUBCUTANEOUS EVERY 5 MIN PRN
Status: DISCONTINUED | OUTPATIENT
Start: 2021-04-14 | End: 2021-04-14 | Stop reason: HOSPADM

## 2021-04-14 RX ORDER — MIDAZOLAM HYDROCHLORIDE 1 MG/ML
2 INJECTION INTRAMUSCULAR; INTRAVENOUS
Status: DISCONTINUED | OUTPATIENT
Start: 2021-04-14 | End: 2021-04-14 | Stop reason: HOSPADM

## 2021-04-14 RX ORDER — DONEPEZIL HYDROCHLORIDE 5 MG/1
10 TABLET, FILM COATED ORAL NIGHTLY
Status: DISCONTINUED | OUTPATIENT
Start: 2021-04-14 | End: 2021-04-16 | Stop reason: HOSPADM

## 2021-04-14 RX ORDER — MIRTAZAPINE 15 MG/1
30 TABLET, FILM COATED ORAL NIGHTLY
Status: DISCONTINUED | OUTPATIENT
Start: 2021-04-14 | End: 2021-04-16 | Stop reason: HOSPADM

## 2021-04-14 RX ORDER — GUAIFENESIN 200 MG/1
400 TABLET ORAL 4 TIMES DAILY PRN
Status: DISCONTINUED | OUTPATIENT
Start: 2021-04-14 | End: 2021-04-16 | Stop reason: HOSPADM

## 2021-04-14 RX ORDER — ATORVASTATIN CALCIUM 20 MG/1
20 TABLET, FILM COATED ORAL DAILY
Status: DISCONTINUED | OUTPATIENT
Start: 2021-04-14 | End: 2021-04-16 | Stop reason: HOSPADM

## 2021-04-14 RX ORDER — FENTANYL CITRATE 50 UG/ML
INJECTION, SOLUTION INTRAMUSCULAR; INTRAVENOUS PRN
Status: DISCONTINUED | OUTPATIENT
Start: 2021-04-14 | End: 2021-04-14 | Stop reason: SDUPTHER

## 2021-04-14 RX ORDER — METOCLOPRAMIDE HYDROCHLORIDE 5 MG/ML
10 INJECTION INTRAMUSCULAR; INTRAVENOUS
Status: DISCONTINUED | OUTPATIENT
Start: 2021-04-14 | End: 2021-04-14 | Stop reason: HOSPADM

## 2021-04-14 RX ORDER — SODIUM CHLORIDE 9 MG/ML
INJECTION, SOLUTION INTRAVENOUS CONTINUOUS
Status: DISCONTINUED | OUTPATIENT
Start: 2021-04-14 | End: 2021-04-14

## 2021-04-14 RX ORDER — CIPROFLOXACIN 2 MG/ML
400 INJECTION, SOLUTION INTRAVENOUS EVERY 12 HOURS
Status: DISCONTINUED | OUTPATIENT
Start: 2021-04-14 | End: 2021-04-16 | Stop reason: HOSPADM

## 2021-04-14 RX ADMIN — SODIUM CHLORIDE, SODIUM LACTATE, POTASSIUM CHLORIDE, AND CALCIUM CHLORIDE: 600; 310; 30; 20 INJECTION, SOLUTION INTRAVENOUS at 08:22

## 2021-04-14 RX ADMIN — OXYCODONE HYDROCHLORIDE AND ACETAMINOPHEN 1 TABLET: 5; 325 TABLET ORAL at 13:54

## 2021-04-14 RX ADMIN — DONEPEZIL HYDROCHLORIDE 10 MG: 5 TABLET, FILM COATED ORAL at 21:02

## 2021-04-14 RX ADMIN — CIPROFLOXACIN 400 MG: 2 INJECTION INTRAVENOUS at 21:23

## 2021-04-14 RX ADMIN — LIDOCAINE HYDROCHLORIDE 50 MG: 10 INJECTION, SOLUTION INFILTRATION; PERINEURAL at 08:25

## 2021-04-14 RX ADMIN — ONDANSETRON HYDROCHLORIDE 4 MG: 2 INJECTION, SOLUTION INTRAMUSCULAR; INTRAVENOUS at 10:02

## 2021-04-14 RX ADMIN — ROCURONIUM BROMIDE 10 MG: 10 INJECTION, SOLUTION INTRAVENOUS at 10:00

## 2021-04-14 RX ADMIN — OXYCODONE HYDROCHLORIDE AND ACETAMINOPHEN 1 TABLET: 5; 325 TABLET ORAL at 17:32

## 2021-04-14 RX ADMIN — METFORMIN HYDROCHLORIDE 1000 MG: 500 TABLET ORAL at 17:32

## 2021-04-14 RX ADMIN — ROCURONIUM BROMIDE 10 MG: 10 INJECTION, SOLUTION INTRAVENOUS at 09:36

## 2021-04-14 RX ADMIN — FENTANYL CITRATE 50 MCG: 50 INJECTION, SOLUTION INTRAMUSCULAR; INTRAVENOUS at 10:19

## 2021-04-14 RX ADMIN — GABAPENTIN 400 MG: 400 CAPSULE ORAL at 21:02

## 2021-04-14 RX ADMIN — CIPROFLOXACIN 400 MG: 2 INJECTION, SOLUTION INTRAVENOUS at 08:35

## 2021-04-14 RX ADMIN — Medication 6 MG: at 21:02

## 2021-04-14 RX ADMIN — VERAPAMIL HYDROCHLORIDE 120 MG: 120 TABLET, FILM COATED ORAL at 21:02

## 2021-04-14 RX ADMIN — Medication 0.4 MG: at 08:37

## 2021-04-14 RX ADMIN — ROCURONIUM BROMIDE 10 MG: 10 INJECTION, SOLUTION INTRAVENOUS at 08:25

## 2021-04-14 RX ADMIN — SUGAMMADEX 250 MG: 100 INJECTION, SOLUTION INTRAVENOUS at 10:05

## 2021-04-14 RX ADMIN — SODIUM CHLORIDE, PRESERVATIVE FREE 10 ML: 5 INJECTION INTRAVENOUS at 21:02

## 2021-04-14 RX ADMIN — PROPOFOL 100 MG: 10 INJECTION, EMULSION INTRAVENOUS at 08:25

## 2021-04-14 RX ADMIN — Medication 10 MG: at 09:43

## 2021-04-14 RX ADMIN — MIRTAZAPINE 30 MG: 15 TABLET, FILM COATED ORAL at 21:02

## 2021-04-14 RX ADMIN — ROCURONIUM BROMIDE 40 MG: 10 INJECTION, SOLUTION INTRAVENOUS at 08:27

## 2021-04-14 RX ADMIN — GABAPENTIN 400 MG: 400 CAPSULE ORAL at 13:54

## 2021-04-14 RX ADMIN — SODIUM CHLORIDE, SODIUM LACTATE, POTASSIUM CHLORIDE, AND CALCIUM CHLORIDE: 600; 310; 30; 20 INJECTION, SOLUTION INTRAVENOUS at 06:57

## 2021-04-14 RX ADMIN — SODIUM CHLORIDE: 9 INJECTION, SOLUTION INTRAVENOUS at 13:54

## 2021-04-14 RX ADMIN — HYDROMORPHONE HYDROCHLORIDE 0.25 MG: 1 INJECTION, SOLUTION INTRAMUSCULAR; INTRAVENOUS; SUBCUTANEOUS at 10:35

## 2021-04-14 RX ADMIN — ENOXAPARIN SODIUM 40 MG: 40 INJECTION SUBCUTANEOUS at 13:53

## 2021-04-14 RX ADMIN — HYDROMORPHONE HYDROCHLORIDE 0.25 MG: 1 INJECTION, SOLUTION INTRAMUSCULAR; INTRAVENOUS; SUBCUTANEOUS at 10:46

## 2021-04-14 RX ADMIN — FENTANYL CITRATE 50 MCG: 50 INJECTION, SOLUTION INTRAMUSCULAR; INTRAVENOUS at 08:25

## 2021-04-14 RX ADMIN — HYDROMORPHONE HYDROCHLORIDE 0.25 MG: 1 INJECTION, SOLUTION INTRAMUSCULAR; INTRAVENOUS; SUBCUTANEOUS at 10:51

## 2021-04-14 RX ADMIN — HYDROMORPHONE HYDROCHLORIDE 0.25 MG: 1 INJECTION, SOLUTION INTRAMUSCULAR; INTRAVENOUS; SUBCUTANEOUS at 10:41

## 2021-04-14 ASSESSMENT — PAIN SCALES - GENERAL
PAINLEVEL_OUTOF10: 5
PAINLEVEL_OUTOF10: 4
PAINLEVEL_OUTOF10: 5
PAINLEVEL_OUTOF10: 4
PAINLEVEL_OUTOF10: 5
PAINLEVEL_OUTOF10: 5
PAINLEVEL_OUTOF10: 6

## 2021-04-14 ASSESSMENT — PAIN DESCRIPTION - LOCATION
LOCATION: PENIS
LOCATION: SHOULDER
LOCATION: PENIS

## 2021-04-14 ASSESSMENT — PAIN DESCRIPTION - PAIN TYPE
TYPE: SURGICAL PAIN
TYPE: CHRONIC PAIN

## 2021-04-14 ASSESSMENT — PAIN DESCRIPTION - ORIENTATION: ORIENTATION: RIGHT

## 2021-04-14 ASSESSMENT — LIFESTYLE VARIABLES: SMOKING_STATUS: 0

## 2021-04-14 ASSESSMENT — ENCOUNTER SYMPTOMS: SHORTNESS OF BREATH: 0

## 2021-04-14 NOTE — ANESTHESIA PRE PROCEDURE
Department of Anesthesiology  Preprocedure Note       Name:  Whitley Lambert   Age:  80 y.o.  :  1934                                          MRN:  [id number]         Date:  2021      Surgeon: Ja Rueda):  Sandra Rubio MD    Procedure: Procedure(s):  TRANSURETHRAL RESECTION PROSTATE    Medications prior to admission:   Prior to Admission medications    Medication Sig Start Date End Date Taking? Authorizing Provider   omeprazole (PRILOSEC) 20 MG delayed release capsule Take 20 mg by mouth daily   Yes Historical Provider, MD   gabapentin (NEURONTIN) 400 MG capsule Take 400 mg by mouth 3 times daily. Yes Historical Provider, MD   guaiFENesin 400 MG tablet Take 400 mg by mouth 4 times daily as needed for Cough   Yes Historical Provider, MD   mirtazapine (REMERON) 30 MG tablet Take 30 mg by mouth nightly   Yes Historical Provider, MD   atorvastatin (LIPITOR) 20 MG tablet Take 20 mg by mouth daily Take 1/2 tab at bedtime.    Yes Historical Provider, MD   verapamil (CALAN) 120 MG tablet Take 120 mg by mouth 2 times daily   Yes Historical Provider, MD   donepezil (ARICEPT) 10 MG tablet Take 10 mg by mouth nightly   Yes Historical Provider, MD   melatonin 3 MG TABS tablet Take 3 mg by mouth nightly as needed Take 2 hs prn   Yes Historical Provider, MD   ibuprofen (ADVIL;MOTRIN) 600 MG tablet Take 600 mg by mouth 2 times daily   Yes Historical Provider, MD   tamsulosin (FLOMAX) 0.4 MG capsule Take 0.8 mg by mouth daily Take 2 capsules daily   Yes Historical Provider, MD   finasteride (PROSCAR) 5 MG tablet Take 5 mg by mouth daily   Yes Historical Provider, MD   metFORMIN (GLUCOPHAGE) 1000 MG tablet Take 1,000 mg by mouth 2 times daily (with meals)   Yes Historical Provider, MD       Current medications:    Current Facility-Administered Medications   Medication Dose Route Frequency Provider Last Rate Last Admin    ciprofloxacin (CIPRO) IVPB 400 mg  400 mg Intravenous Once Sandra Rubio MD Allergies: Allergies   Allergen Reactions    Pcn [Penicillins] Hives     \"Huge dose of PCN\"       Problem List:    Patient Active Problem List   Diagnosis Code    Benign prostatic hyperplasia with urinary retention N40.1, R33.8       Past Medical History:        Diagnosis Date    Arthritis     Diabetes mellitus ([address]     History of blood transfusion     Hyperlipidemia     Hypertension     Immunization due     Covid Vaccine 3/1/2021 and 3/30/2021    Neuropathy        Past Surgical History:        Procedure Laterality Date    COLONOSCOPY      ENDOSCOPY, COLON, DIAGNOSTIC      EYE SURGERY Bilateral     cataract    NOSE SURGERY      SKIN BIOPSY      TONSILLECTOMY         Social History:    Social History     Tobacco Use    Smoking status: Former Smoker     Types: Cigars     Quit date:      Years since quittin.2    Smokeless tobacco: Never Used   Substance Use Topics    Alcohol use: Not Currently                                Counseling given: Not Answered      Vital Signs (Current):   Vitals:    21 0643   BP: (!) 148/77   Pulse: 75   Resp: 18   Temp: 98.4 °F (36.9 °C)   TempSrc: Temporal   SpO2: 98%   Weight: 154 lb (69.9 kg)   Height: 5' 7\" (1.702 m)                                              BP Readings from Last 3 Encounters:   21 (!) 148/77   21 115/71       NPO Status: Time of last liquid consumption: 0000                        Time of last solid consumption: 0000                        Date of last liquid consumption: 21                        Date of last solid food consumption: 21    BMI:   Wt Readings from Last 3 Encounters:   21 154 lb (69.9 kg)   21 154 lb (69.9 kg)   21 156 lb (70.8 kg)     Body mass index is 24.12 kg/m².     CBC:   Lab Results   Component Value Date    WBC 5.4 2021    RBC 4.44 2021    HGB 13.2 2021    HCT 40.2 2021    MCV 90.5 2021    RDW 13.5 2021     2021 CMP:   Lab Results   Component Value Date     2021    K 3.8 2021     2021    CO2 25 2021    BUN 14 2021    CREATININE 0.6 2021    GFRAA >59 2021    LABGLOM >60 2021    GLUCOSE 205 2021    CALCIUM 9.2 2021       POC Tests: No results for input(s): POCGLU, POCNA, POCK, POCCL, POCBUN, POCHEMO, POCHCT in the last 72 hours.     Coags:   Lab Results   Component Value Date    PROTIME 14.9 2021    INR 1.17 2021    APTT 26.8 2021       HCG (If Applicable): No results found for: PREGTESTUR, PREGSERUM, HCG, HCGQUANT     ABGs: No results found for: PHART, PO2ART, ISN7KMR, VDN5MVP, BEART, G1QSTZGX     Type & Screen (If Applicable):  No results found for: LABABO, LABRH    Drug/Infectious Status (If Applicable):  No results found for: HIV, HEPCAB    COVID-19 Screening (If Applicable):   Lab Results   Component Value Date    COVID19 Not Detected 2021           Anesthesia Evaluation  Patient summary reviewed and Nursing notes reviewed no history of anesthetic complications:   Airway: Mallampati: II  TM distance: >3 FB   Neck ROM: full  Mouth opening: > = 3 FB Dental:          Pulmonary:       (-) shortness of breath, sleep apnea and not a current smoker                           Cardiovascular:  Exercise tolerance: good (>4 METS),   (+) hypertension:, hyperlipidemia    (-) pacemaker, past MI, CAD, CABG/stent, dysrhythmias and  angina    ECG reviewed               Beta Blocker:  Not on Beta Blocker      ROS comment: EK BPM  Sinus rhythm with 1st degree A-V block  Anteroseptal infarct age indeterminate  Left anterior fascicular block  Poor R wave progression - cannot rule out anteroseptal infarct  Comparison Summary: No serial comparison made  Summary: Abnormal ECG     Neuro/Psych:      (-) seizures and CVA           GI/Hepatic/Renal:   (+) GERD: well controlled,      (-) liver disease and no renal disease      ROS comment: Enlarged prostate. Endo/Other:    (+) DiabetesType II DM, , no malignancy/cancer. (-) blood dyscrasia, no malignancy/cancer               Abdominal:           Vascular:     - DVT and PE. Anesthesia Plan      general     ASA 3       Induction: intravenous. MIPS: Postoperative opioids intended and Prophylactic antiemetics administered. Anesthetic plan and risks discussed with patient. Use of blood products discussed with patient whom consented to blood products.                    Laurann Sever,    4/14/2021

## 2021-04-14 NOTE — H&P
Whitley Lambert is a 80 y.o. male who presents today   Chief Complaint   Patient presents with    Cystoscopy       I am here today for my catheter removal and for cysto.            Urinary Retention  Patient complains of urinary retention. Onset of retention was 1 month ago and was sudden in onset. Patient currently does have a urinary catheter in place. 1450 ml of urine were drained when catheter was placed initially in the ED as described on 2/25/2021. He presented again to the ER again on 3/19/2021 and 1000 mL was drained. At his last attempted voiding trial on 3/25/2021 300 mL was drained when he failed to void. Prior to this event voiding symptoms consisted of slow stream, nocturia. Prior treatments include tamulosin, finasteride and Harper catheter decompression. Patient has failed at least 3 different voiding trials he comes in today for cystoscopy. . Recent medications that may have affected his voiding include none.   The documentation below is from when he was initially seen by our nurse practitioner.     Patient presents to the clinic today for fill and pull secondary to BPH with urinary retention. Rory Becker is unsure if he has seen a urologist in the past. Janee Svetlana was seen at [facility] ED with urinary retention on 2/25/2021.  They placed a Harper catheter for decompression with approximately 1450 mL and return they then discontinued the catheter before discharging from the ED and place patient on Flomax 0.4 mg daily.  Patient then presented to [facility] ED on 3/19/2021 complaints of urinary retention.  Harper catheter was placed with 1000 mL drained.  Patient was sent home with Harper catheter and was told to follow-up. Rory Becker is a very poor historian and usually sees a physician at [facility] is unsure which medications he is taking at home. Janee Mota states prior to catheter placement he voided approximately 3-4 times during the day, nocturia once at night, with moderate stream.  He does have a history of diabetes but denies any neuropathy.  He states he can feel when his bladder is full.  He denies any incomplete emptying, fever, chills, nausea, vomiting, frequency, urgency, dysuria.  Patient's medication list indicates he is on Flomax 0.4 mg daily and finasteride 5 mg daily for the past year.  Unable to obtain PSA from [address] is still independent and lives alone, is also hard of hearing. Annika Lee last visit I wrote out his medications that he needs to be taking and educated him on increasing Flomax to 2 capsules at night every day. Terence Hemarianna tells me he has been taking it.  He did complain of 1 episode of incontinence, likely bladder spasm  Past Medical History   No past medical history on file.        Past Surgical History   No past surgical history on file.        Current Facility-Administered Medications          Current Outpatient Medications   Medication Sig Dispense Refill    omeprazole (PRILOSEC) 20 MG delayed release capsule Take 20 mg by mouth daily        gabapentin (NEURONTIN) 400 MG capsule Take 400 mg by mouth 3 times daily.        guaiFENesin 400 MG tablet Take 400 mg by mouth 4 times daily as needed for Cough        mirtazapine (REMERON) 30 MG tablet Take 30 mg by mouth nightly        atorvastatin (LIPITOR) 20 MG tablet Take 20 mg by mouth daily Take 1/2 tab at bedtime.        verapamil (CALAN) 120 MG tablet Take 120 mg by mouth 2 times daily        donepezil (ARICEPT) 10 MG tablet Take 10 mg by mouth nightly        melatonin 3 MG TABS tablet Take 3 mg by mouth nightly as needed Take 2 hs prn        ibuprofen (ADVIL;MOTRIN) 600 MG tablet Take 600 mg by mouth 2 times daily        tamsulosin (FLOMAX) 0.4 MG capsule Take 0.8 mg by mouth daily Take 2 capsules daily        finasteride (PROSCAR) 5 MG tablet Take 5 mg by mouth daily        metFORMIN (GLUCOPHAGE) 1000 MG tablet Take 1,000 mg by mouth 2 times daily (with meals)          No current facility-administered medications for this visit.            Allergies   Allergen Reactions    Pcn [Penicillins]           Social History   Social History            Socioeconomic History    Marital status:        Spouse name: None    Number of children: None    Years of education: None    Highest education level: None   Occupational History    None   Social Needs    Financial resource strain: None    Food insecurity       Worry: None       Inability: None    Transportation needs       Medical: None       Non-medical: None   Tobacco Use    Smoking status: Former Smoker       Types: Cigars    Smokeless tobacco: Never Used   Substance and Sexual Activity    Alcohol use: None    Drug use: None    Sexual activity: None   Lifestyle    Physical activity       Days per week: None       Minutes per session: None    Stress: None   Relationships    Social connections       Talks on phone: None       Gets together: None       Attends [employer] service: None       Active member of club or organization: None       Attends meetings of clubs or organizations: None       Relationship status: None    Intimate partner violence       Fear of current or ex partner: None       Emotionally abused: None       Physically abused: None       Forced sexual activity: None   Other Topics Concern    None   Social History Narrative    None            Family History   No family history on file.        REVIEW OF SYSTEMS:  Review of Systems   Constitutional: Negative. HENT: Negative. Respiratory: Negative. Cardiovascular: Negative. Gastrointestinal: Positive for abdominal distention. Genitourinary: Positive for difficulty urinating and frequency. Negative for dysuria, flank pain and hematuria. Musculoskeletal: Negative. All other systems reviewed and are negative.        PHYSICAL EXAM:  Ht 5' 7\" (1.702 m)   Wt 156 lb (70.8 kg)   BMI 24.43 kg/m²   Physical Exam  Constitutional:       General: He is not in acute distress.      Appearance: Normal appearance. He is well-developed. HENT:      Head: Normocephalic and atraumatic. Nose: Nose normal.   Eyes:      General: No scleral icterus. Conjunctiva/sclera: Conjunctivae normal.      Pupils: Pupils are equal, round, and reactive to light. Neck:      Musculoskeletal: Normal range of motion and neck supple. Trachea: No tracheal deviation. Cardiovascular:      Rate and Rhythm: Normal rate and regular rhythm. Pulmonary:      Effort: Pulmonary effort is normal. No respiratory distress. Breath sounds: No stridor. Abdominal:      General: There is no distension. Palpations: Abdomen is soft. There is no mass. Tenderness: There is no abdominal tenderness. Genitourinary:     Prostate: Enlarged. No nodules present. Musculoskeletal: Normal range of motion. General: No tenderness. Lymphadenopathy:      Cervical: No cervical adenopathy. Skin:     General: Skin is warm and dry. Findings: No erythema. Neurological:      Mental Status: He is alert and oriented to person, place, and time. Psychiatric:         Behavior: Behavior normal.         Judgment: Judgment normal.         Cystoscopy Procedure Note     Indications: Diagnosis    Pre-operative Diagnosis: BPH, urinary retention     Post-operative Diagnosis: Same     Surgeon: Waqar Dixon MD      Assistants: staff     Anesthesia: Local anesthesia topical 2% lidocaine gel     Procedure Details   The risks, benefits, complications, treatment options, and expected outcomes were discussed with the patient. The patient concurred with the proposed plan, giving informed consent.     Cystoscopy was performed today under local anesthesia, using sterile technique. The patient was placed in the supine position, prepped with Hibiclens, and draped in the usual sterile fashion.  A 17 Uruguayan sheath flexible cystoscope was used to inspect both the urethra and bladder using the flexible scope.     Findings:  Anterior urethra: normal without strictures and without scarring. Prostate:  Prostatic urethra: 3+ moderate prostatic hyperplasia. With a moderate sized median lobe with intravesical protrusion with obstruction  Bladder: 2+ trabeculation, without lesions some cellules particularly in the dome. Ureteral orifice(s) was/were seen bilateral. Ureteral orifice(s) both were in the normal location and both ureteral orifices were effluxing clear urine. Changes consistent with BPH and obstruction                             Complications:  None; patient tolerated the procedure well. Disposition: To home after observation. Condition: stable           1. Benign prostatic hyperplasia with urinary retention   His bladder was filled today at the time of cystoscopy. He was unable to void. Harper catheter had to be replaced. We discussed options for management we discussed TURP is the gold standard and will give him the best chance of being catheter free though with his diabetes I told him there is a possible he could have postop urinary retention and have to go home with Harper catheter. We also discussed the risk of incontinence. Fluid absorption bladder neck contracture urethral stricture, incontinence, sexual dysfunction, erectile dysfunction, prostate cancer perforation need to go home with Harper catheter, bleeding, infection, incontinence. This was contrasted with minimally invasive treatment with Rezum and given his age this would be less invasive I told him my experience with this we were able get some people out of retention but the success would be less than with TURP.   He understood and has chosen to proceed with TURP  - Cystoscopy            Orders Placed This Encounter   Procedures    Cystoscopy         Return for PT to be scheduled for Surgery.     All information inputted into the note by the MA to include chief complaint, past medical history, past surgical history, medications, allergies, social and

## 2021-04-14 NOTE — ANESTHESIA POSTPROCEDURE EVALUATION
Department of Anesthesiology  Postprocedure Note    Patient: Izzy Kang  MRN: [id number]  YOB: 1934  Date of evaluation: 4/14/2021  Time:  10:23 AM     Procedure Summary     Date: 04/14/21 Room / Location: [facility] OR [facility]    Anesthesia Start: 1192 Anesthesia Stop: 9507    Procedure: TRANSURETHRAL RESECTION PROSTATE (N/A ) Diagnosis: (BPH WITH URINARY RETENTION)    Surgeons: Elizabeth Magaña MD Responsible Provider: BRYAN Douglas CRNA    Anesthesia Type: general ASA Status: 3          Anesthesia Type: general    Sheri Phase I: Sheri Score: 10    Sheri Phase II:      Last vitals: Reviewed and per EMR flowsheets.        Anesthesia Post Evaluation    Patient location during evaluation: bedside  Level of consciousness: awake and alert  Pain score: 0  Airway patency: patent  Nausea & Vomiting: no nausea and no vomiting  Complications: no  Cardiovascular status: blood pressure returned to baseline  Respiratory status: acceptable  Hydration status: euvolemic

## 2021-04-14 NOTE — OP NOTE
Brief operative Note      Patient: Elena Retana  YOB: 1934  MRN: [id number]    Date of Procedure: 4/14/2021    Pre-Op Diagnosis: BPH WITH URINARY RETENTION    Post-Op Diagnosis: Same       Procedure(s):  TRANSURETHRAL RESECTION PROSTATE    Surgeon(s):  Miguelina Becker MD    Assistant:   * No surgical staff found *    Anesthesia: General    Estimated Blood Loss (mL): 50    Complications: None    Specimens:   ID Type Source Tests Collected by Time Destination   A : PROSTATE TISSUE Tissue Prostate SURGICAL PATHOLOGY Miguelina Becker MD 4/14/2021 0222        Implants:  * No implants in log *      Drains:   Urethral Catheter Triple-lumen 22 fr (Active)   Site Assessment No urethral drainage 04/14/21 1020   Urine Color Colorless 04/14/21 1020       [REMOVED] Urethral Catheter (Removed)       Findings: Trilobar prostatic enlargement with small to moderate median lobe. Wide open prostatic fossa at the end of TURP no capsular perforation. He does have a large heavily trabeculated floppy bladder. 25 [place] Harper with 45 cc in balloon. Detailed Description of Procedure:   See dictated report [id number]    Disposition to PACU then to fifth floor observation on CBI wean CBI overnight if okay 3 bowel routine and voiding trial tomorrow.     Electronically signed by Teresa Ugarte MD on 4/14/2021 at 10:29 AM

## 2021-04-14 NOTE — PROGRESS NOTES
Poor historian when I asked pt \"what time did you take your medication last\".  About reviewing medication list.

## 2021-04-15 LAB
ANION GAP SERPL CALCULATED.3IONS-SCNC: 8 MMOL/L (ref 7–19)
BASOPHILS ABSOLUTE: 0 K/UL (ref 0–0.2)
BASOPHILS RELATIVE PERCENT: 0.1 % (ref 0–1)
BUN BLDV-MCNC: 12 MG/DL (ref 8–23)
CALCIUM SERPL-MCNC: 8.6 MG/DL (ref 8.8–10.2)
CHLORIDE BLD-SCNC: 105 MMOL/L (ref 98–111)
CO2: 27 MMOL/L (ref 22–29)
CREAT SERPL-MCNC: 0.8 MG/DL (ref 0.5–1.2)
EOSINOPHILS ABSOLUTE: 0.1 K/UL (ref 0–0.6)
EOSINOPHILS RELATIVE PERCENT: 1.5 % (ref 0–5)
GFR AFRICAN AMERICAN: >59
GFR NON-AFRICAN AMERICAN: >60
GLUCOSE BLD-MCNC: 122 MG/DL (ref 74–109)
HCT VFR BLD CALC: 34.8 % (ref 42–52)
HEMOGLOBIN: 11.4 G/DL (ref 14–18)
IMMATURE GRANULOCYTES #: 0 K/UL
LYMPHOCYTES ABSOLUTE: 1.8 K/UL (ref 1.1–4.5)
LYMPHOCYTES RELATIVE PERCENT: 22.5 % (ref 20–40)
MCH RBC QN AUTO: 30 PG (ref 27–31)
MCHC RBC AUTO-ENTMCNC: 32.8 G/DL (ref 33–37)
MCV RBC AUTO: 91.6 FL (ref 80–94)
MONOCYTES ABSOLUTE: 0.8 K/UL (ref 0–0.9)
MONOCYTES RELATIVE PERCENT: 10.7 % (ref 0–10)
NEUTROPHILS ABSOLUTE: 5.1 K/UL (ref 1.5–7.5)
NEUTROPHILS RELATIVE PERCENT: 64.9 % (ref 50–65)
PDW BLD-RTO: 13.5 % (ref 11.5–14.5)
PLATELET # BLD: 236 K/UL (ref 130–400)
PMV BLD AUTO: 10.4 FL (ref 9.4–12.4)
POTASSIUM REFLEX MAGNESIUM: 4 MMOL/L (ref 3.5–5)
RBC # BLD: 3.8 M/UL (ref 4.7–6.1)
SODIUM BLD-SCNC: 140 MMOL/L (ref 136–145)
WBC # BLD: 7.8 K/UL (ref 4.8–10.8)

## 2021-04-15 PROCEDURE — 85025 COMPLETE CBC W/AUTO DIFF WBC: CPT

## 2021-04-15 PROCEDURE — 36415 COLL VENOUS BLD VENIPUNCTURE: CPT

## 2021-04-15 PROCEDURE — 51798 US URINE CAPACITY MEASURE: CPT

## 2021-04-15 PROCEDURE — 6360000002 HC RX W HCPCS: Performed by: UROLOGY

## 2021-04-15 PROCEDURE — 6370000000 HC RX 637 (ALT 250 FOR IP): Performed by: UROLOGY

## 2021-04-15 PROCEDURE — 80048 BASIC METABOLIC PNL TOTAL CA: CPT

## 2021-04-15 PROCEDURE — 96372 THER/PROPH/DIAG INJ SC/IM: CPT

## 2021-04-15 PROCEDURE — 51700 IRRIGATION OF BLADDER: CPT

## 2021-04-15 PROCEDURE — 2580000003 HC RX 258: Performed by: UROLOGY

## 2021-04-15 PROCEDURE — G0378 HOSPITAL OBSERVATION PER HR: HCPCS

## 2021-04-15 RX ORDER — CIPROFLOXACIN 500 MG/1
500 TABLET, FILM COATED ORAL 2 TIMES DAILY
Qty: 14 TABLET | Refills: 0 | Status: SHIPPED | OUTPATIENT
Start: 2021-04-15 | End: 2021-04-22

## 2021-04-15 RX ORDER — IBUPROFEN 600 MG/1
600 TABLET ORAL 2 TIMES DAILY
Qty: 120 TABLET | Refills: 3
Start: 2021-04-15 | End: 2021-11-15 | Stop reason: ALTCHOICE

## 2021-04-15 RX ORDER — OXYCODONE HYDROCHLORIDE AND ACETAMINOPHEN 5; 325 MG/1; MG/1
1 TABLET ORAL EVERY 4 HOURS PRN
Qty: 12 TABLET | Refills: 0 | Status: SHIPPED | OUTPATIENT
Start: 2021-04-15 | End: 2021-04-16 | Stop reason: HOSPADM

## 2021-04-15 RX ADMIN — VERAPAMIL HYDROCHLORIDE 120 MG: 120 TABLET, FILM COATED ORAL at 08:59

## 2021-04-15 RX ADMIN — MIRTAZAPINE 30 MG: 15 TABLET, FILM COATED ORAL at 21:29

## 2021-04-15 RX ADMIN — ATORVASTATIN CALCIUM 20 MG: 20 TABLET, FILM COATED ORAL at 08:59

## 2021-04-15 RX ADMIN — VERAPAMIL HYDROCHLORIDE 120 MG: 120 TABLET, FILM COATED ORAL at 21:30

## 2021-04-15 RX ADMIN — GABAPENTIN 400 MG: 400 CAPSULE ORAL at 15:12

## 2021-04-15 RX ADMIN — DONEPEZIL HYDROCHLORIDE 10 MG: 5 TABLET, FILM COATED ORAL at 21:30

## 2021-04-15 RX ADMIN — ENOXAPARIN SODIUM 40 MG: 40 INJECTION SUBCUTANEOUS at 15:12

## 2021-04-15 RX ADMIN — CIPROFLOXACIN 400 MG: 2 INJECTION INTRAVENOUS at 21:29

## 2021-04-15 RX ADMIN — CIPROFLOXACIN 400 MG: 2 INJECTION INTRAVENOUS at 08:59

## 2021-04-15 RX ADMIN — GABAPENTIN 400 MG: 400 CAPSULE ORAL at 08:59

## 2021-04-15 RX ADMIN — SODIUM CHLORIDE 25 ML: 9 INJECTION, SOLUTION INTRAVENOUS at 01:03

## 2021-04-15 RX ADMIN — PANTOPRAZOLE SODIUM 40 MG: 40 TABLET, DELAYED RELEASE ORAL at 05:31

## 2021-04-15 RX ADMIN — SODIUM CHLORIDE: 9 INJECTION, SOLUTION INTRAVENOUS at 21:35

## 2021-04-15 RX ADMIN — METFORMIN HYDROCHLORIDE 1000 MG: 500 TABLET ORAL at 17:58

## 2021-04-15 RX ADMIN — METFORMIN HYDROCHLORIDE 1000 MG: 500 TABLET ORAL at 08:59

## 2021-04-15 RX ADMIN — GABAPENTIN 400 MG: 400 CAPSULE ORAL at 21:30

## 2021-04-15 NOTE — OP NOTE
[facility] [address] [facility]                                OPERATIVE REPORT    PATIENT NAME: Gauri Shaw                      :        1934  MED REC NO:   [id number]                              ROOM:       [facility]  ACCOUNT NO:   [de-identified]                           ADMIT DATE: 2021  PROVIDER:     Danielito Hernandes MD    DATE OF PROCEDURE:  2021    TITLE OF OPERATION:  Transurethral resection of the prostate (TURP). ANESTHETIC:  General anesthetic. ATTENDING SURGEON:  Danielito Hernandes MD    PREOPERATIVE DIAGNOSIS:  BPH with urinary retention. POSTOPERATIVE DIAGNOSIS:  BPH with urinary retention. PREOP HISTORY:  The patient is 80years old. He presented to the ED on  2021, with urinary retention. He had 1450 mL in the bladder  drained. He has failed multiple voiding trials including a trip back to  the ER on , where he had 1000 mL drained and his last attempted  voiding trial on 2021, he was unable to void. Therefore, he has a  chronic indwelling catheter. He has been on maximum medical therapy  with finasteride and tamsulosin. I scoped him in the office and at that  time, he failed another voiding trial.  This showed moderate  trabeculation. He had a moderate-sized median lobe with intravesical  protrusion with obstruction and lateral lobe obstruction. Therefore, it  was recommended that he proceed with TURP. I told him this was the best  chance that he would be catheter-free; however, we did discuss the  possibility of despite TURP if his bladder contractility has been  compromised or end-stage, he may not be able to void and have postop  urinary retention requiring to go home with catheter or requiring  in-and-out catheterization. We also discussed the risks of bleeding,  fluid absorption, infection, urinary incontinence. Sexual dysfunction  including ED and ejaculatory dysfunction. the apical  tissue down to what appeared to be surgical capsule. As I encountered  arterial bleeders, these were cauterized with pinpoint cauterization. There was some residual tissue sort of between the 4 and 6 o'clock  position proximal to the veru and this was also resected down. I was  careful as not to resect past the veru or try to do aggressive resection  of the apical tissue. The Ellik evacuator was then used to control the  chips and remove the chips. I then turned my attention to the right lateral lobe again beginning  anteriorly at 11 o'clock position. We resected this from there to about  7 o'clock at the bladder neck until the bladder neck fibers were  identified, then resected from the bladder neck back to the mid gland  and then the mid gland back to the apical region again taking care as  not to perforate or go beyond the prostatic capsule. Arterial bleeders  were cauterized and the adenomatous tissue was resected down the  surgical capsule, and there was no perforation. Tissue between the 8  and 6 o'clock position was resected proximal to the veru and alongside  it. At no point did I resect past the veru. The anterior tissue  between 2 and 10 o'clock was resected beginning at the bladder neck back  to the mid gland and carefully did not resect really any except just a  little flap of mucosa from the mid gland back to the veru level. This  tissue anteriorly was pretty thin, so there was not a lot to resect  anyways. The Ellik evacuator was again used to control the chips. I  then concluded the resection and looked in the bladder to make sure  there were no residual chips in the bladder. Hemostasis was obtained. The veru was kind of sticking up as a little flap, so I went ahead and  just resected it so it just would not stick up. The scope was removed.    Then, a 22-Croatian three-way Harper catheter with a catheter guide was  inserted without difficulty, 45 mL was placed in the balloon. This hand  irrigated to clear to pink tinged. The specimen was collected and sent  for pathologic examination. Estimated blood loss was 50 mL. He  tolerated the procedure well. He was awakened from the anesthetic and  taken to the recovery room in stable condition on CBI.         Ngoc Penny MD    D: 04/14/2021 11:39:54      T: 04/14/2021 12:56:35     [id number]  Job#: [id number]     Doc#: [id number]

## 2021-04-15 NOTE — DISCHARGE SUMMARY
Discharge Summary  Date:4/16/2021        Patient Name:Maximo Hooker     YOB: 1934     Age:87 y.o. Admit Date:4/14/2021   Admission Condition:stable   Discharged Condition:stable  Discharge Date: 04/16/21     Discharge Diagnoses   Active Problems:    Benign prostatic hyperplasia with urinary retention  Resolved Problems:    * No resolved hospital problems. [facility] Stay   Narrative of Hospital Course:   Patient admitted after surgical intervention with TURP on 4/14/2021 for BPH with urinary retention and multiple failed voiding trials. Prior to the surgery, he had a chronic indwelling catheter placed. He has been on maximum medical therapy with finasteride and tamsulosin without significant relief of symptoms. Cystoscopy performed in the office by Dr. Carlito Barraza revealed moderate trabeculation with a moderate sized median lobe with intravesical protrusion with obstruction and lateral lobe obstruction. Dr. Carlito Barraza did discuss with the patient that while TURP was the best option for him, if his bladder contractility has been compromised he may not be able to void and may need to continue on with chronic indwelling Harper versus intermittent catheterization at home. Per Dr. Bruno Mclaughlin operative note, he does have a large, heavily trabeculated floppy bladder. CBI was weaned overnight. Urine appeared bloody this morning, however, with quick flush of the bladder irrigant, the urine was clear. Patient has failed voiding trial on the floor and will need to be discharged home with a Harper catheter. Consultants:   None    Time Spent on Discharge:  30 minutes were spent in patient examination, evaluation, counseling as well as medication reconciliation, prescriptions for required medications, discharge plan and follow up.       Surgeries/Procedures Performed:  Procedure(s):  TRANSURETHRAL RESECTION PROSTATE 4/14/2021    Treatments:   IV hydration and antibiotics: Cipro    Significant Diagnostic Studies:   Recent Labs:  CBC:   Lab Results   Component Value Date    WBC 7.8 04/15/2021    RBC 3.80 04/15/2021    HGB 11.4 04/15/2021    HCT 34.8 04/15/2021    MCV 91.6 04/15/2021    MCH 30.0 04/15/2021    MCHC 32.8 04/15/2021    RDW 13.5 04/15/2021     04/15/2021     BMP:    Lab Results   Component Value Date    GLUCOSE 122 04/15/2021     04/15/2021    K 4.0 04/15/2021     04/15/2021    CO2 27 04/15/2021    ANIONGAP 8 04/15/2021    BUN 12 04/15/2021    CREATININE 0.8 04/15/2021    CALCIUM 8.6 04/15/2021    LABGLOM >60 04/15/2021    GFRAA >59 04/15/2021       Radiology Last 7 Days:  No results found.     Discharge Plan   Disposition: Home    Provider Follow-Up:   [facility]  [address] [place] [address] Drive, [address] [address] [phone]  In 1 week  voiding trial- nurse schedule: early morning appt    Veronique Dang MD  [address] [facility] [phone]    In 1 month  TURP fu          Patient Instructions   Diet: regular diet    Activity: see discharge instructions    Discharge Medications         Medication List      START taking these medications    ciprofloxacin 500 MG tablet  Commonly known as: CIPRO  Take 1 tablet by mouth 2 times daily for 7 days        CHANGE how you take these medications    ibuprofen 600 MG tablet  Commonly known as: ADVIL;MOTRIN  Take 1 tablet by mouth 2 times daily HOLD THIS MEDICATION FOR AT LEAST 5 DAYS  What changed: additional instructions        CONTINUE taking these medications    atorvastatin 20 MG tablet  Commonly known as: LIPITOR     donepezil 10 MG tablet  Commonly known as: ARICEPT     gabapentin 400 MG capsule  Commonly known as: NEURONTIN     guaiFENesin 400 MG tablet     melatonin 3 MG Tabs tablet     metFORMIN 1000 MG tablet  Commonly known as: GLUCOPHAGE     mirtazapine 30 MG tablet  Commonly known as: REMERON     omeprazole 20 MG delayed release capsule  Commonly known as: PRILOSEC     verapamil 120 MG tablet  Commonly known as: KACIE        STOP taking these medications    finasteride 5 MG tablet  Commonly known as: PROSCAR     tamsulosin 0.4 MG capsule  Commonly known as: FLOMAX           Where to Get Your Medications      These [facility] [address] [facility] [phone] - F [phone]  [facility], [facility] [address] [address]    Phone: [phone]   · ciprofloxacin 500 MG tablet     Information about where to get these medications is not yet available    Ask your nurse or doctor about these medications  · ibuprofen 600 MG tablet         Electronically signed by BRYAN Hanks CNP on 4/15/21 at 4:43 PM CDT

## 2021-04-16 VITALS
HEART RATE: 59 BPM | OXYGEN SATURATION: 95 % | WEIGHT: 154 LBS | TEMPERATURE: 98.3 F | RESPIRATION RATE: 16 BRPM | BODY MASS INDEX: 24.17 KG/M2 | HEIGHT: 67 IN | SYSTOLIC BLOOD PRESSURE: 130 MMHG | DIASTOLIC BLOOD PRESSURE: 60 MMHG

## 2021-04-16 PROCEDURE — 6360000002 HC RX W HCPCS: Performed by: UROLOGY

## 2021-04-16 PROCEDURE — 51798 US URINE CAPACITY MEASURE: CPT

## 2021-04-16 PROCEDURE — G0378 HOSPITAL OBSERVATION PER HR: HCPCS

## 2021-04-16 PROCEDURE — 99024 POSTOP FOLLOW-UP VISIT: CPT | Performed by: UROLOGY

## 2021-04-16 PROCEDURE — 99024 POSTOP FOLLOW-UP VISIT: CPT | Performed by: NURSE PRACTITIONER

## 2021-04-16 PROCEDURE — 6370000000 HC RX 637 (ALT 250 FOR IP): Performed by: UROLOGY

## 2021-04-16 RX ORDER — BISACODYL 10 MG
10 SUPPOSITORY, RECTAL RECTAL DAILY PRN
Status: DISCONTINUED | OUTPATIENT
Start: 2021-04-16 | End: 2021-04-16 | Stop reason: HOSPADM

## 2021-04-16 RX ADMIN — ATORVASTATIN CALCIUM 20 MG: 20 TABLET, FILM COATED ORAL at 08:46

## 2021-04-16 RX ADMIN — VERAPAMIL HYDROCHLORIDE 120 MG: 120 TABLET, FILM COATED ORAL at 08:46

## 2021-04-16 RX ADMIN — PANTOPRAZOLE SODIUM 40 MG: 40 TABLET, DELAYED RELEASE ORAL at 07:27

## 2021-04-16 RX ADMIN — POLYETHYLENE GLYCOL 3350 17 G: 17 POWDER, FOR SOLUTION ORAL at 08:46

## 2021-04-16 RX ADMIN — GABAPENTIN 400 MG: 400 CAPSULE ORAL at 08:46

## 2021-04-16 RX ADMIN — METFORMIN HYDROCHLORIDE 1000 MG: 500 TABLET ORAL at 08:46

## 2021-04-16 RX ADMIN — CIPROFLOXACIN 400 MG: 2 INJECTION INTRAVENOUS at 00:35

## 2021-04-16 NOTE — PROGRESS NOTES
Patient discharged home with Granger catheter.  Patient instructed on granger care and use and to follow up with Urology at scheduled appointment for voiding trial. Electronically signed by Alisha Nogueira RN on 4/16/2021 at 3:50 PM

## 2021-04-16 NOTE — PROGRESS NOTES
Comprehensive Nutrition Assessment    Type and Reason for Visit:  Initial, Positive Nutrition Screen    Nutrition Recommendations/Plan: Modify diet to CHO 4    Nutrition Assessment:  Pt currently has good PO intake. HgbA1C 6.6. Will modify diet to include CHO control. Malnutrition Assessment:  Malnutrition Status:  No malnutrition      Current Nutrition Therapies:    DIET GENERAL;     Anthropometric Measures:  · Height: 5' 7\" (170.2 cm)  · Current Body Weight: 154 lb (69.9 kg)   · Ideal Body Weight: 148 lbs  · BMI: 24.1  · BMI Categories: Normal Weight (BMI 22.0 to 24.9) age over 72       Nutrition Diagnosis:   No nutrition diagnosis at this time     Nutrition Interventions:   Food and/or Nutrient Delivery:  Modify Current Diet  Coordination of Nutrition Care:  Continue to monitor while inpatient    Goals:  Blood glucose levels 150 or below       Nutrition Monitoring and Evaluation:   Food/Nutrient Intake Outcomes:  Food and Nutrient Intake  Physical Signs/Symptoms Outcomes:  Biochemical Data, Nutrition Focused Physical Findings, Weight     Electronically signed by Laura Rosario, MS, RD, LD on 4/16/21 at 2:01 PM CDT    Contact: [phone]

## 2021-04-16 NOTE — PROGRESS NOTES
Urology Progress Note      SUBJECTIVE: No complaints has not had a bowel movement 2 days    OBJECTIVE: Failed voiding trial yesterday Harper catheter was replaced urine was bloody requiring CBI overnight this was weaned off now he is on gravity drainage clear to pink-tinged    REVIEW OF SYSTEMS:   Review of Systems      Physical  VITALS:  /71   Pulse 58   Temp 98.6 °F (37 °C)   Resp 16   Ht 5' 7\" (1.702 m)   Wt 154 lb (69.9 kg)   SpO2 94%   BMI 24.12 kg/m²   TEMPERATURE:  Current - Temp: 98.6 °F (37 °C); Max - Temp  Av.5 °F (36.9 °C)  Min: 98.2 °F (36.8 °C)  Max: 99.2 °F (37.3 °C)   24 HR I&O     Intake/Output Summary (Last 24 hours) at 2021 0736  Last data filed at 2021 5359  Gross per 24 hour   Intake 3776.52 ml   Output 5400 ml   Net -1623.48 ml     BACK: no tenderness in spine or flanks  ABDOMEN:  soft, non-distended and non-tender  HEART:  normal rate and regular rhythm  CHEST: Normal respiratory effort  GENITAL/URINARY: Normal genitalia. Urine just slightly pink-tinged off CBI    Data       CBC:   Recent Labs     04/15/21  0428   WBC 7.8   HGB 11.4*   HCT 34.8*        BMP:    Recent Labs     04/15/21  0428      K 4.0      CO2 27   BUN 12   CREATININE 0.8   GLUCOSE 122*       Pathology report pending    Radiology:      Imaging studies:      ASSESSMENT AND PLAN    Patient Active Problem List   Diagnosis    Benign prostatic hyperplasia with urinary retention       1. POD #2 status post TURP for BPH with urinary retention. Patient failed voiding trial requiring Harper catheter to be replaced. He did require CBI after catheter replacement for hematuria this is been weaned. We will reattempt voiding trial this a.m. If he fails will replace catheter him he can go home with Harper catheter.     Mercedes Morgan MD

## 2021-04-27 ENCOUNTER — NURSE ONLY (OUTPATIENT)
Dept: UROLOGY | Age: 86
End: 2021-04-27
Payer: MEDICARE

## 2021-04-27 DIAGNOSIS — R33.8 BENIGN PROSTATIC HYPERPLASIA WITH URINARY RETENTION: ICD-10-CM

## 2021-04-27 DIAGNOSIS — N40.1 BENIGN PROSTATIC HYPERPLASIA WITH URINARY RETENTION: ICD-10-CM

## 2021-04-27 PROCEDURE — 51702 INSERT TEMP BLADDER CATH: CPT | Performed by: NURSE PRACTITIONER

## 2021-04-27 NOTE — PROGRESS NOTES
Patient is accompanied with his son today. Patient has dementia and cannot recall why he has the catheter or when he had surgery. His son cares for him and keeps up with all follow up information but he wished to not be present in the room for the voiding trial today. Patient of Dr. Rubio Pugh presents today for voiding trial post TURP 4/14/21. The patient denies any fever, chills or  N&V. After patient had given consent, using the catheter in place, 200cc of sterile water was installed into the bladder with no complications, patient voiced not being able to tolerate anymore. Patient was only able to dribble a small amount, only enough to cover bottom of cylinder, not enough to even measure. It was red tinged. No clots were noted in cylinder or leg bag that was removed prior to starting procedure. Patient requested to speak with RUDDY Rudolph before letting me replace the granger catheter. Procedure Note (4/27/21): After receiving verbal orders from RUDDY Rudolph post conversation with patient, I was instructed to place urethral granger catheter. Using sterile technique, patient was cleaned with Hibiclens and sterile water solution. A 18F ST catheter was inserted into the bladder, bladder was drained of 300cc of urine, 10 mL of sterile water was used to fill up the balloon. The catheter was then hooked up to a drainage bag. The patient tolerated the procedure well. I showed and explained to patient how to change catheter bags if he wanted and provided bags to patient. Patient should follow up as scheduled. RUDDY Rudolph was in office at time of procedure. Patient to follow up in 1 week nurse visit for voiding trial. AM APPT only.

## 2021-05-04 ENCOUNTER — NURSE ONLY (OUTPATIENT)
Dept: UROLOGY | Age: 86
End: 2021-05-04
Payer: MEDICARE

## 2021-05-04 DIAGNOSIS — N40.1 BENIGN PROSTATIC HYPERPLASIA WITH URINARY RETENTION: ICD-10-CM

## 2021-05-04 DIAGNOSIS — R33.8 BENIGN PROSTATIC HYPERPLASIA WITH URINARY RETENTION: ICD-10-CM

## 2021-05-04 PROCEDURE — 51700 IRRIGATION OF BLADDER: CPT | Performed by: NURSE PRACTITIONER

## 2021-05-04 NOTE — PROGRESS NOTES
Patient of Dr Rubio Pugh presents today for voiding trial post TURP. The patient denies any fever, chills or  N&V. After patient had given consent, using the catheter in place, 240cc of sterile water was installed into the bladder with no complications. Patient was able to void 200CC. Denyse Dubin, APRN was in office at time of procedure. Patient was advised to drink clear fluids for the next couple hours and urinate. Patient was advised they may experience some blood in the urine and burning with urination for the next couple days. If the patient is unable to urinate or develops fever, chills, N&V or suprapubic pain, they will call office for an appt at clinic or seek medical treatment at nearest ER, PCP or Urgent Care if after hours. Patient verbalized understanding and all questions were answered. Patient advised to call the office with any questions or concerns. Patient is to follow up as scheduled.

## 2021-05-14 ENCOUNTER — OFFICE VISIT (OUTPATIENT)
Dept: UROLOGY | Age: 86
End: 2021-05-14
Payer: MEDICARE

## 2021-05-14 DIAGNOSIS — N39.0 URINARY TRACT INFECTION ASSOCIATED WITH INDWELLING URETHRAL CATHETER, INITIAL ENCOUNTER (HCC): ICD-10-CM

## 2021-05-14 DIAGNOSIS — R33.8 BENIGN PROSTATIC HYPERPLASIA WITH URINARY RETENTION: Primary | ICD-10-CM

## 2021-05-14 DIAGNOSIS — T83.511A URINARY TRACT INFECTION ASSOCIATED WITH INDWELLING URETHRAL CATHETER, INITIAL ENCOUNTER (HCC): ICD-10-CM

## 2021-05-14 DIAGNOSIS — N40.1 BENIGN PROSTATIC HYPERPLASIA WITH URINARY RETENTION: Primary | ICD-10-CM

## 2021-05-14 DIAGNOSIS — Z09 POSTOPERATIVE EXAMINATION: ICD-10-CM

## 2021-05-14 LAB
BACTERIA URINE, POC: ABNORMAL
BILIRUBIN URINE: 0 MG/DL
BLOOD, URINE: POSITIVE
CASTS URINE, POC: ABNORMAL
CLARITY: ABNORMAL
COLOR: YELLOW
CRYSTALS URINE, POC: ABNORMAL
EPI CELLS URINE, POC: ABNORMAL
GLUCOSE URINE: ABNORMAL
KETONES, URINE: NEGATIVE
LEUKOCYTE EST, POC: ABNORMAL
NITRITE, URINE: POSITIVE
PH UA: 6 (ref 4.5–8)
PROTEIN UA: POSITIVE
RBC URINE, POC: 50
SPECIFIC GRAVITY UA: 1.02 (ref 1–1.03)
UROBILINOGEN, URINE: NORMAL
WBC URINE, POC: 50
YEAST URINE, POC: ABNORMAL

## 2021-05-14 PROCEDURE — 81000 URINALYSIS NONAUTO W/SCOPE: CPT | Performed by: UROLOGY

## 2021-05-14 PROCEDURE — 99024 POSTOP FOLLOW-UP VISIT: CPT | Performed by: UROLOGY

## 2021-05-14 PROCEDURE — 51798 US URINE CAPACITY MEASURE: CPT | Performed by: UROLOGY

## 2021-05-14 RX ORDER — CIPROFLOXACIN 500 MG/1
500 TABLET, FILM COATED ORAL DAILY
Qty: 10 TABLET | Refills: 0 | Status: SHIPPED | OUTPATIENT
Start: 2021-05-14 | End: 2021-05-24

## 2021-05-14 ASSESSMENT — ENCOUNTER SYMPTOMS
SORE THROAT: 0
NAUSEA: 0
WHEEZING: 0
BACK PAIN: 0
VOMITING: 0
EYE PAIN: 0
COUGH: 0

## 2021-05-14 NOTE — PROGRESS NOTES
Edith Mckay is a 80 y.o. male who presents today   Chief Complaint   Patient presents with    Post-Op Check     I am here for my 1 mo post op follow up for BPH       Post-Operative Follow-up  Patient here for post-op follow-up. Patient is 1 month status post TURP done 4/14/2021. Patient had developed urinary retention requiring indwelling catheter. Postoperatively he did fail a voiding trial in the hospital and voiding trial approximately 1 week postop. He finally did a voiding trial on 5/4/2021 he was able to void since that time he feels like he is voiding well he does have some dysuria at the end of his penis but he is emptying with a good flow no hematuria no trouble with control. Excell Mala His AUA symptom score is 6/35. The patient reports dysuria. The patient is not having any pain. No fever or flank pain      Past Medical History:   Diagnosis Date    Arthritis     Diabetes mellitus ([address]     History of blood transfusion     Hyperlipidemia     Hypertension     Immunization due     Covid Vaccine 3/1/2021 and 3/30/2021    Neuropathy        Past Surgical History:   Procedure Laterality Date    COLONOSCOPY      ENDOSCOPY, COLON, DIAGNOSTIC      EYE SURGERY Bilateral     cataract    NOSE SURGERY      SKIN BIOPSY      TONSILLECTOMY      TURP N/A 4/14/2021    TRANSURETHRAL RESECTION PROSTATE performed by Da Mckeon MD at [facility] OR       Current Outpatient Medications   Medication Sig Dispense Refill    ciprofloxacin (CIPRO) 500 MG tablet Take 1 tablet by mouth daily for 10 days 10 tablet 0    ibuprofen (ADVIL;MOTRIN) 600 MG tablet Take 1 tablet by mouth 2 times daily HOLD THIS MEDICATION FOR AT LEAST 5 DAYS 120 tablet 3    omeprazole (PRILOSEC) 20 MG delayed release capsule Take 20 mg by mouth daily      gabapentin (NEURONTIN) 400 MG capsule Take 400 mg by mouth 3 times daily.       guaiFENesin 400 MG tablet Take 400 mg by mouth 4 times daily as needed for Cough      mirtazapine (REMERON) 30 MG tablet Take 30 mg by mouth nightly      atorvastatin (LIPITOR) 20 MG tablet Take 20 mg by mouth daily Take 1/2 tab at bedtime.  verapamil (CALAN) 120 MG tablet Take 120 mg by mouth 2 times daily      donepezil (ARICEPT) 10 MG tablet Take 10 mg by mouth nightly      melatonin 3 MG TABS tablet Take 3 mg by mouth nightly as needed Take 2 hs prn      metFORMIN (GLUCOPHAGE) 1000 MG tablet Take 1,000 mg by mouth 2 times daily (with meals)       No current facility-administered medications for this visit. Allergies   Allergen Reactions    Pcn [Penicillins] Hives     \"Huge dose of PCN\"       Social History     Socioeconomic History    Marital status:      Spouse name: None    Number of children: 0    Years of education: None    Highest education level: None   Occupational History    None   Social Needs    Financial resource strain: None    Food insecurity     Worry: None     Inability: None    Transportation needs     Medical: None     Non-medical: None   Tobacco Use    Smoking status: Former Smoker     Types: Cigars     Quit date:      Years since quittin.3    Smokeless tobacco: Never Used   Substance and Sexual Activity    Alcohol use: Not Currently    Drug use: Never    Sexual activity: None   Lifestyle    Physical activity     Days per week: None     Minutes per session: None    Stress: None   Relationships    Social connections     Talks on phone: None     Gets together: None     Attends Voodoo service: None     Active member of club or organization: None     Attends meetings of clubs or organizations: None     Relationship status: None    Intimate partner violence     Fear of current or ex partner: None     Emotionally abused: None     Physically abused: None     Forced sexual activity: None   Other Topics Concern    None   Social History Narrative    None       History reviewed. No pertinent family history.     REVIEW OF SYSTEMS:  Review of Systems Constitutional: Negative for chills and fever. HENT: Negative for congestion and sore throat. Eyes: Negative for pain and visual disturbance. Respiratory: Negative for cough and wheezing. Cardiovascular: Negative for chest pain and palpitations. Gastrointestinal: Negative for nausea and vomiting. Endocrine: Negative for polyphagia and polyuria. Genitourinary: Positive for dysuria. Negative for decreased urine volume, difficulty urinating, discharge, enuresis, flank pain, frequency, genital sores, hematuria, penile pain, penile swelling, scrotal swelling, testicular pain and urgency. Musculoskeletal: Negative for back pain and neck pain. Skin: Negative for rash and wound. Allergic/Immunologic: Negative for environmental allergies and food allergies. Neurological: Negative for dizziness and headaches. Hematological: Negative for adenopathy. Does not bruise/bleed easily. Psychiatric/Behavioral: Negative for confusion and hallucinations. All other systems reviewed and are negative. PHYSICAL EXAM:  There were no vitals taken for this visit. Physical Exam  Constitutional:       General: He is not in acute distress. Appearance: Normal appearance. He is well-developed. HENT:      Head: Normocephalic and atraumatic. Nose: Nose normal.   Eyes:      General: No scleral icterus. Conjunctiva/sclera: Conjunctivae normal.      Pupils: Pupils are equal, round, and reactive to light. Neck:      Musculoskeletal: Normal range of motion and neck supple. Trachea: No tracheal deviation. Cardiovascular:      Rate and Rhythm: Normal rate and regular rhythm. Pulmonary:      Effort: Pulmonary effort is normal. No respiratory distress. Breath sounds: No stridor. Abdominal:      General: There is no distension. Palpations: Abdomen is soft. There is no mass. Tenderness: There is no abdominal tenderness. Musculoskeletal: Normal range of motion.          General: No tenderness. Lymphadenopathy:      Cervical: No cervical adenopathy. Skin:     General: Skin is warm and dry. Findings: No erythema. Neurological:      Mental Status: He is alert and oriented to person, place, and time.    Psychiatric:         Behavior: Behavior normal.         Judgment: Judgment normal.             DATA:  CBC:   Lab Results   Component Value Date    WBC 7.8 04/15/2021    RBC 3.80 04/15/2021    HGB 11.4 04/15/2021    HCT 34.8 04/15/2021    MCV 91.6 04/15/2021    MCH 30.0 04/15/2021    MCHC 32.8 04/15/2021    RDW 13.5 04/15/2021     04/15/2021    MPV 10.4 04/15/2021     BMP:    Lab Results   Component Value Date     04/15/2021    K 4.0 04/15/2021     04/15/2021    CO2 27 04/15/2021    BUN 12 04/15/2021    CREATININE 0.8 04/15/2021    CALCIUM 8.6 04/15/2021    GFRAA >59 04/15/2021    LABGLOM >60 04/15/2021    GLUCOSE 122 04/15/2021     Results for orders placed or performed in visit on 21   POC URINE with Microscopic   Result Value Ref Range    Color, UA Yellow     Clarity, UA Cloudy (A) Clear    Glucose, Ur 100mg/dl (A)     Bilirubin Urine 0 mg/dL    Ketones, Urine Negative     Specific Gravity, UA 1.020 1.005 - 1.030    Blood, Urine Positive (A)     pH, UA 6.0 4.5 - 8.0    Protein, UA Positive (A) Negative    Nitrite, Urine Positive (A)     Leukocytes, UA large (A)     Urobilinogen, Urine Normal     rbc urine, poc 50 (A)     wbc urine, poc 50 (A)     bacteria urine, poc TRACE (A)     yeast urine, poc      casts urine, poc      epi cells urine, poc      crystals urine, poc       Performed by: [address] [address]   Department of Pathology   FINAL SURGICAL PATHOLOGY REPORT   Patient Name: Bettye Reyes          Accession No:  [id number]    Age Sex:   1934    87 Y M        Pt

## 2021-05-16 LAB — URINE CULTURE, ROUTINE: NORMAL

## 2021-05-18 ENCOUNTER — TELEPHONE (OUTPATIENT)
Dept: UROLOGY | Age: 86
End: 2021-05-18

## 2021-05-18 NOTE — TELEPHONE ENCOUNTER
Pt called, scheduled appt. Pt would like to have a reminder sent for appt on 07/09 @ 11. [facility] verified address.     Thank you

## 2021-08-24 ENCOUNTER — ANESTHESIA EVENT (OUTPATIENT)
Dept: OPERATING ROOM | Age: 86
End: 2021-08-24
Payer: MEDICARE

## 2021-08-24 ENCOUNTER — ANESTHESIA (OUTPATIENT)
Dept: OPERATING ROOM | Age: 86
End: 2021-08-24
Payer: MEDICARE

## 2021-08-24 ENCOUNTER — PREP FOR PROCEDURE (OUTPATIENT)
Dept: UROLOGY | Age: 86
End: 2021-08-24

## 2021-08-24 ENCOUNTER — HOSPITAL ENCOUNTER (OUTPATIENT)
Age: 86
Setting detail: OUTPATIENT SURGERY
Discharge: HOME | End: 2021-08-24
Attending: UROLOGY | Admitting: UROLOGY
Payer: MEDICARE

## 2021-08-24 ENCOUNTER — PROCEDURE VISIT (OUTPATIENT)
Dept: UROLOGY | Age: 86
End: 2021-08-24
Payer: MEDICARE

## 2021-08-24 ENCOUNTER — APPOINTMENT (OUTPATIENT)
Dept: CT IMAGING | Age: 86
End: 2021-08-24
Attending: UROLOGY
Payer: MEDICARE

## 2021-08-24 ENCOUNTER — OFFICE VISIT (OUTPATIENT)
Dept: UROLOGY | Age: 86
End: 2021-08-24
Payer: MEDICARE

## 2021-08-24 VITALS — DIASTOLIC BLOOD PRESSURE: 52 MMHG | OXYGEN SATURATION: 100 % | TEMPERATURE: 97 F | SYSTOLIC BLOOD PRESSURE: 91 MMHG

## 2021-08-24 VITALS
SYSTOLIC BLOOD PRESSURE: 126 MMHG | BODY MASS INDEX: 22.76 KG/M2 | OXYGEN SATURATION: 97 % | HEIGHT: 67 IN | HEART RATE: 72 BPM | DIASTOLIC BLOOD PRESSURE: 86 MMHG | WEIGHT: 145 LBS | RESPIRATION RATE: 16 BRPM | TEMPERATURE: 98 F

## 2021-08-24 VITALS — HEIGHT: 67 IN | TEMPERATURE: 98.1 F | WEIGHT: 145.5 LBS | BODY MASS INDEX: 22.84 KG/M2

## 2021-08-24 DIAGNOSIS — R33.8 BENIGN PROSTATIC HYPERPLASIA WITH URINARY RETENTION: ICD-10-CM

## 2021-08-24 DIAGNOSIS — R30.0 DYSURIA: Primary | ICD-10-CM

## 2021-08-24 DIAGNOSIS — N32.0 BLADDER NECK CONTRACTURE: ICD-10-CM

## 2021-08-24 DIAGNOSIS — N40.1 BENIGN PROSTATIC HYPERPLASIA WITH URINARY RETENTION: ICD-10-CM

## 2021-08-24 DIAGNOSIS — N32.0: ICD-10-CM

## 2021-08-24 DIAGNOSIS — R33.9 URINARY RETENTION: ICD-10-CM

## 2021-08-24 LAB
ANION GAP SERPL CALCULATED.3IONS-SCNC: 15 MMOL/L (ref 7–19)
APTT: 26.2 SEC (ref 26–36.2)
BACTERIA URINE, POC: 0
BASOPHILS ABSOLUTE: 0 K/UL (ref 0–0.2)
BASOPHILS RELATIVE PERCENT: 0.2 % (ref 0–1)
BILIRUBIN URINE: 0 MG/DL
BLOOD, URINE: POSITIVE
BUN BLDV-MCNC: 16 MG/DL (ref 8–23)
CALCIUM SERPL-MCNC: 9.7 MG/DL (ref 8.8–10.2)
CASTS URINE, POC: 0
CHLORIDE BLD-SCNC: 101 MMOL/L (ref 98–111)
CLARITY: CLEAR
CO2: 25 MMOL/L (ref 22–29)
COLOR: YELLOW
CREAT SERPL-MCNC: 0.7 MG/DL (ref 0.5–1.2)
CRYSTALS URINE, POC: 0
EOSINOPHILS ABSOLUTE: 0.1 K/UL (ref 0–0.6)
EOSINOPHILS RELATIVE PERCENT: 0.6 % (ref 0–5)
EPI CELLS URINE, POC: 0
GFR AFRICAN AMERICAN: >59
GFR NON-AFRICAN AMERICAN: >60
GLUCOSE BLD-MCNC: 100 MG/DL (ref 70–99)
GLUCOSE BLD-MCNC: 105 MG/DL (ref 74–109)
GLUCOSE URINE: 500
HCT VFR BLD CALC: 42.7 % (ref 42–52)
HEMOGLOBIN: 13.5 G/DL (ref 14–18)
IMMATURE GRANULOCYTES #: 0 K/UL
INR BLD: 1.11 (ref 0.88–1.18)
KETONES, URINE: POSITIVE
LEUKOCYTE EST, POC: ABNORMAL
LYMPHOCYTES ABSOLUTE: 1.9 K/UL (ref 1.1–4.5)
LYMPHOCYTES RELATIVE PERCENT: 17.3 % (ref 20–40)
MCH RBC QN AUTO: 28.1 PG (ref 27–31)
MCHC RBC AUTO-ENTMCNC: 31.6 G/DL (ref 33–37)
MCV RBC AUTO: 88.8 FL (ref 80–94)
MONOCYTES ABSOLUTE: 0.7 K/UL (ref 0–0.9)
MONOCYTES RELATIVE PERCENT: 6.6 % (ref 0–10)
NEUTROPHILS ABSOLUTE: 8.1 K/UL (ref 1.5–7.5)
NEUTROPHILS RELATIVE PERCENT: 74.9 % (ref 50–65)
NITRITE, URINE: NEGATIVE
PDW BLD-RTO: 14.2 % (ref 11.5–14.5)
PERFORMED ON: ABNORMAL
PH UA: 6 (ref 4.5–8)
PLATELET # BLD: 235 K/UL (ref 130–400)
PMV BLD AUTO: 10.9 FL (ref 9.4–12.4)
POTASSIUM SERPL-SCNC: 3.8 MMOL/L (ref 3.5–4.9)
PROTEIN UA: NEGATIVE
PROTHROMBIN TIME: 14.5 SEC (ref 12–14.6)
RBC # BLD: 4.81 M/UL (ref 4.7–6.1)
RBC URINE, POC: 2
SODIUM BLD-SCNC: 141 MMOL/L (ref 136–145)
SPECIFIC GRAVITY UA: 1.02 (ref 1–1.03)
UROBILINOGEN, URINE: NORMAL
WBC # BLD: 10.8 K/UL (ref 4.8–10.8)
WBC URINE, POC: 6
YEAST URINE, POC: 0

## 2021-08-24 PROCEDURE — 7100000011 HC PHASE II RECOVERY - ADDTL 15 MIN: Performed by: UROLOGY

## 2021-08-24 PROCEDURE — 3600000004 HC SURGERY LEVEL 4 BASE: Performed by: UROLOGY

## 2021-08-24 PROCEDURE — 2500000003 HC RX 250 WO HCPCS

## 2021-08-24 PROCEDURE — 3700000001 HC ADD 15 MINUTES (ANESTHESIA): Performed by: UROLOGY

## 2021-08-24 PROCEDURE — 85610 PROTHROMBIN TIME: CPT

## 2021-08-24 PROCEDURE — 85025 COMPLETE CBC W/AUTO DIFF WBC: CPT

## 2021-08-24 PROCEDURE — 2580000003 HC RX 258

## 2021-08-24 PROCEDURE — 7100000001 HC PACU RECOVERY - ADDTL 15 MIN: Performed by: UROLOGY

## 2021-08-24 PROCEDURE — 99215 OFFICE O/P EST HI 40 MIN: CPT | Performed by: NURSE PRACTITIONER

## 2021-08-24 PROCEDURE — 74176 CT ABD & PELVIS W/O CONTRAST: CPT

## 2021-08-24 PROCEDURE — 85730 THROMBOPLASTIN TIME PARTIAL: CPT

## 2021-08-24 PROCEDURE — 51798 US URINE CAPACITY MEASURE: CPT | Performed by: NURSE PRACTITIONER

## 2021-08-24 PROCEDURE — 2500000003 HC RX 250 WO HCPCS: Performed by: UROLOGY

## 2021-08-24 PROCEDURE — C1894 INTRO/SHEATH, NON-LASER: HCPCS | Performed by: UROLOGY

## 2021-08-24 PROCEDURE — 51102 DRAIN BL W/CATH INSERTION: CPT | Performed by: UROLOGY

## 2021-08-24 PROCEDURE — 7100000000 HC PACU RECOVERY - FIRST 15 MIN: Performed by: UROLOGY

## 2021-08-24 PROCEDURE — 80048 BASIC METABOLIC PNL TOTAL CA: CPT

## 2021-08-24 PROCEDURE — 3600000014 HC SURGERY LEVEL 4 ADDTL 15MIN: Performed by: UROLOGY

## 2021-08-24 PROCEDURE — 6360000002 HC RX W HCPCS

## 2021-08-24 PROCEDURE — 7100000010 HC PHASE II RECOVERY - FIRST 15 MIN: Performed by: UROLOGY

## 2021-08-24 PROCEDURE — 36415 COLL VENOUS BLD VENIPUNCTURE: CPT

## 2021-08-24 PROCEDURE — 3700000000 HC ANESTHESIA ATTENDED CARE: Performed by: UROLOGY

## 2021-08-24 PROCEDURE — 2709999900 HC NON-CHARGEABLE SUPPLY: Performed by: UROLOGY

## 2021-08-24 PROCEDURE — 52000 CYSTOURETHROSCOPY: CPT | Performed by: UROLOGY

## 2021-08-24 PROCEDURE — 81001 URINALYSIS AUTO W/SCOPE: CPT | Performed by: NURSE PRACTITIONER

## 2021-08-24 PROCEDURE — 82947 ASSAY GLUCOSE BLOOD QUANT: CPT

## 2021-08-24 RX ORDER — HYDRALAZINE HYDROCHLORIDE 20 MG/ML
5 INJECTION INTRAMUSCULAR; INTRAVENOUS EVERY 10 MIN PRN
Status: DISCONTINUED | OUTPATIENT
Start: 2021-08-24 | End: 2021-08-24 | Stop reason: HOSPADM

## 2021-08-24 RX ORDER — EPHEDRINE SULFATE 50 MG/ML
INJECTION, SOLUTION INTRAVENOUS PRN
Status: DISCONTINUED | OUTPATIENT
Start: 2021-08-24 | End: 2021-08-24 | Stop reason: SDUPTHER

## 2021-08-24 RX ORDER — ONDANSETRON 2 MG/ML
4 INJECTION INTRAMUSCULAR; INTRAVENOUS EVERY 4 HOURS PRN
Status: DISCONTINUED | OUTPATIENT
Start: 2021-08-24 | End: 2021-08-24 | Stop reason: HOSPADM

## 2021-08-24 RX ORDER — SODIUM CHLORIDE, SODIUM LACTATE, POTASSIUM CHLORIDE, CALCIUM CHLORIDE 600; 310; 30; 20 MG/100ML; MG/100ML; MG/100ML; MG/100ML
INJECTION, SOLUTION INTRAVENOUS CONTINUOUS
Status: CANCELLED | OUTPATIENT
Start: 2021-08-24

## 2021-08-24 RX ORDER — SODIUM CHLORIDE, SODIUM LACTATE, POTASSIUM CHLORIDE, CALCIUM CHLORIDE 600; 310; 30; 20 MG/100ML; MG/100ML; MG/100ML; MG/100ML
INJECTION, SOLUTION INTRAVENOUS CONTINUOUS PRN
Status: DISCONTINUED | OUTPATIENT
Start: 2021-08-24 | End: 2021-08-24 | Stop reason: SDUPTHER

## 2021-08-24 RX ORDER — FENTANYL CITRATE 50 UG/ML
50 INJECTION, SOLUTION INTRAMUSCULAR; INTRAVENOUS
Status: CANCELLED | OUTPATIENT
Start: 2021-08-24 | End: 2021-08-24

## 2021-08-24 RX ORDER — LIDOCAINE HYDROCHLORIDE 10 MG/ML
1 INJECTION, SOLUTION EPIDURAL; INFILTRATION; INTRACAUDAL; PERINEURAL
Status: CANCELLED | OUTPATIENT
Start: 2021-08-24 | End: 2021-08-24

## 2021-08-24 RX ORDER — INDOMETHACIN 25 MG/1
25 CAPSULE ORAL 3 TIMES DAILY PRN
COMMUNITY
Start: 2021-08-09 | End: 2021-11-15 | Stop reason: ALTCHOICE

## 2021-08-24 RX ORDER — MORPHINE SULFATE 4 MG/ML
2 INJECTION, SOLUTION INTRAMUSCULAR; INTRAVENOUS EVERY 5 MIN PRN
Status: DISCONTINUED | OUTPATIENT
Start: 2021-08-24 | End: 2021-08-24 | Stop reason: HOSPADM

## 2021-08-24 RX ORDER — LIDOCAINE HYDROCHLORIDE 10 MG/ML
INJECTION, SOLUTION EPIDURAL; INFILTRATION; INTRACAUDAL; PERINEURAL PRN
Status: DISCONTINUED | OUTPATIENT
Start: 2021-08-24 | End: 2021-08-24 | Stop reason: SDUPTHER

## 2021-08-24 RX ORDER — FENTANYL CITRATE 50 UG/ML
INJECTION, SOLUTION INTRAMUSCULAR; INTRAVENOUS PRN
Status: DISCONTINUED | OUTPATIENT
Start: 2021-08-24 | End: 2021-08-24 | Stop reason: SDUPTHER

## 2021-08-24 RX ORDER — ONDANSETRON 2 MG/ML
INJECTION INTRAMUSCULAR; INTRAVENOUS PRN
Status: DISCONTINUED | OUTPATIENT
Start: 2021-08-24 | End: 2021-08-24 | Stop reason: SDUPTHER

## 2021-08-24 RX ORDER — MIDAZOLAM HYDROCHLORIDE 1 MG/ML
INJECTION INTRAMUSCULAR; INTRAVENOUS PRN
Status: DISCONTINUED | OUTPATIENT
Start: 2021-08-24 | End: 2021-08-24 | Stop reason: SDUPTHER

## 2021-08-24 RX ORDER — METOCLOPRAMIDE HYDROCHLORIDE 5 MG/ML
10 INJECTION INTRAMUSCULAR; INTRAVENOUS
Status: DISCONTINUED | OUTPATIENT
Start: 2021-08-24 | End: 2021-08-24 | Stop reason: HOSPADM

## 2021-08-24 RX ORDER — SODIUM CHLORIDE 9 MG/ML
25 INJECTION, SOLUTION INTRAVENOUS PRN
Status: CANCELLED | OUTPATIENT
Start: 2021-08-24

## 2021-08-24 RX ORDER — SODIUM CHLORIDE 0.9 % (FLUSH) 0.9 %
5-40 SYRINGE (ML) INJECTION PRN
Status: CANCELLED | OUTPATIENT
Start: 2021-08-24

## 2021-08-24 RX ORDER — SCOLOPAMINE TRANSDERMAL SYSTEM 1 MG/1
1 PATCH, EXTENDED RELEASE TRANSDERMAL ONCE
Status: CANCELLED | OUTPATIENT
Start: 2021-08-24 | End: 2021-08-24

## 2021-08-24 RX ORDER — LABETALOL HYDROCHLORIDE 5 MG/ML
5 INJECTION, SOLUTION INTRAVENOUS EVERY 10 MIN PRN
Status: DISCONTINUED | OUTPATIENT
Start: 2021-08-24 | End: 2021-08-24 | Stop reason: HOSPADM

## 2021-08-24 RX ORDER — MORPHINE SULFATE 4 MG/ML
4 INJECTION, SOLUTION INTRAMUSCULAR; INTRAVENOUS
Status: CANCELLED | OUTPATIENT
Start: 2021-08-24 | End: 2021-08-24

## 2021-08-24 RX ORDER — LIDOCAINE HYDROCHLORIDE 10 MG/ML
INJECTION, SOLUTION EPIDURAL; INFILTRATION; INTRACAUDAL; PERINEURAL PRN
Status: DISCONTINUED | OUTPATIENT
Start: 2021-08-24 | End: 2021-08-24 | Stop reason: ALTCHOICE

## 2021-08-24 RX ORDER — SODIUM CHLORIDE 9 MG/ML
INJECTION, SOLUTION INTRAVENOUS CONTINUOUS
Status: DISCONTINUED | OUTPATIENT
Start: 2021-08-24 | End: 2021-08-24 | Stop reason: HOSPADM

## 2021-08-24 RX ORDER — SODIUM CHLORIDE 0.9 % (FLUSH) 0.9 %
5-40 SYRINGE (ML) INJECTION EVERY 12 HOURS SCHEDULED
Status: CANCELLED | OUTPATIENT
Start: 2021-08-24

## 2021-08-24 RX ORDER — MIDAZOLAM HYDROCHLORIDE 1 MG/ML
2 INJECTION INTRAMUSCULAR; INTRAVENOUS
Status: CANCELLED | OUTPATIENT
Start: 2021-08-24 | End: 2021-08-24

## 2021-08-24 RX ORDER — PROMETHAZINE HYDROCHLORIDE 25 MG/ML
6.25 INJECTION, SOLUTION INTRAMUSCULAR; INTRAVENOUS
Status: DISCONTINUED | OUTPATIENT
Start: 2021-08-24 | End: 2021-08-24 | Stop reason: HOSPADM

## 2021-08-24 RX ORDER — OXYCODONE HYDROCHLORIDE AND ACETAMINOPHEN 5; 325 MG/1; MG/1
2 TABLET ORAL EVERY 4 HOURS PRN
Status: DISCONTINUED | OUTPATIENT
Start: 2021-08-24 | End: 2021-08-24 | Stop reason: HOSPADM

## 2021-08-24 RX ORDER — HYDROMORPHONE HYDROCHLORIDE 1 MG/ML
0.25 INJECTION, SOLUTION INTRAMUSCULAR; INTRAVENOUS; SUBCUTANEOUS EVERY 5 MIN PRN
Status: DISCONTINUED | OUTPATIENT
Start: 2021-08-24 | End: 2021-08-24 | Stop reason: HOSPADM

## 2021-08-24 RX ORDER — MORPHINE SULFATE 4 MG/ML
4 INJECTION, SOLUTION INTRAMUSCULAR; INTRAVENOUS EVERY 5 MIN PRN
Status: DISCONTINUED | OUTPATIENT
Start: 2021-08-24 | End: 2021-08-24 | Stop reason: HOSPADM

## 2021-08-24 RX ORDER — DIPHENHYDRAMINE HYDROCHLORIDE 50 MG/ML
12.5 INJECTION INTRAMUSCULAR; INTRAVENOUS
Status: DISCONTINUED | OUTPATIENT
Start: 2021-08-24 | End: 2021-08-24 | Stop reason: HOSPADM

## 2021-08-24 RX ORDER — HYDROMORPHONE HYDROCHLORIDE 1 MG/ML
0.5 INJECTION, SOLUTION INTRAMUSCULAR; INTRAVENOUS; SUBCUTANEOUS EVERY 5 MIN PRN
Status: DISCONTINUED | OUTPATIENT
Start: 2021-08-24 | End: 2021-08-24 | Stop reason: HOSPADM

## 2021-08-24 RX ORDER — PROPOFOL 10 MG/ML
INJECTION, EMULSION INTRAVENOUS PRN
Status: DISCONTINUED | OUTPATIENT
Start: 2021-08-24 | End: 2021-08-24 | Stop reason: SDUPTHER

## 2021-08-24 RX ORDER — MEPERIDINE HYDROCHLORIDE 25 MG/ML
12.5 INJECTION INTRAMUSCULAR; INTRAVENOUS; SUBCUTANEOUS EVERY 5 MIN PRN
Status: DISCONTINUED | OUTPATIENT
Start: 2021-08-24 | End: 2021-08-24 | Stop reason: HOSPADM

## 2021-08-24 RX ORDER — ACETAMINOPHEN 500 MG
1000 TABLET ORAL 4 TIMES DAILY PRN
Qty: 1 TABLET | Refills: 0 | COMMUNITY
Start: 2021-08-24 | End: 2021-11-15

## 2021-08-24 RX ADMIN — ONDANSETRON HYDROCHLORIDE 4 MG: 2 INJECTION, SOLUTION INTRAMUSCULAR; INTRAVENOUS at 16:49

## 2021-08-24 RX ADMIN — PROPOFOL 110 MG: 10 INJECTION, EMULSION INTRAVENOUS at 16:43

## 2021-08-24 RX ADMIN — LIDOCAINE HYDROCHLORIDE 40 MG: 10 INJECTION, SOLUTION EPIDURAL; INFILTRATION; INTRACAUDAL; PERINEURAL at 16:43

## 2021-08-24 RX ADMIN — FENTANYL CITRATE 50 MCG: 50 INJECTION, SOLUTION INTRAMUSCULAR; INTRAVENOUS at 16:43

## 2021-08-24 RX ADMIN — MIDAZOLAM 1 MG: 1 INJECTION INTRAMUSCULAR; INTRAVENOUS at 16:43

## 2021-08-24 RX ADMIN — EPHEDRINE SULFATE 10 MG: 50 INJECTION INTRAMUSCULAR; INTRAVENOUS; SUBCUTANEOUS at 17:01

## 2021-08-24 RX ADMIN — EPHEDRINE SULFATE 10 MG: 50 INJECTION INTRAMUSCULAR; INTRAVENOUS; SUBCUTANEOUS at 16:52

## 2021-08-24 RX ADMIN — SODIUM CHLORIDE, SODIUM LACTATE, POTASSIUM CHLORIDE, AND CALCIUM CHLORIDE: 600; 310; 30; 20 INJECTION, SOLUTION INTRAVENOUS at 16:40

## 2021-08-24 ASSESSMENT — PAIN SCALES - GENERAL
PAINLEVEL_OUTOF10: 0
PAINLEVEL_OUTOF10: 0

## 2021-08-24 ASSESSMENT — ENCOUNTER SYMPTOMS
NAUSEA: 0
ABDOMINAL DISTENTION: 0
SHORTNESS OF BREATH: 0
VOMITING: 0
BACK PAIN: 0
ABDOMINAL DISTENTION: 0
BACK PAIN: 0
ABDOMINAL PAIN: 0
VOMITING: 0
ABDOMINAL PAIN: 0
NAUSEA: 0

## 2021-08-24 ASSESSMENT — LIFESTYLE VARIABLES: SMOKING_STATUS: 0

## 2021-08-24 NOTE — PROGRESS NOTES
The patient has no family or friends present. He states, \"I drove here myself and I'm driving myself home. \" Steve Barnes is his  and he was called. Su Fitzpatrick stated that he knows nothing about the medications True Mart takes or his medical/surgical history. Su Fitzpatrick was asked to come to the hospital whenever he could get her to ensure that the pt has a ride home in the case that he is not admitted. Su Fitzpatrick didn't know if True Mart would be able to spend the night with him or not. Dr. Roe Backer to be made aware whenever he finishes his current OR procedure.

## 2021-08-24 NOTE — PROGRESS NOTES
Patient is status post TURP done on 4/14/2021. He came in to see the nurse practitioner today with complaints of dysuria and not been able to void getting worse over the last 3 weeks. Bladder scan in the office revealed a residual of 408 cc on exam he had some bladder distention. Attempts by the practitioner to place Harper catheter were unsuccessful therefore now plan for cystoscopy he likely has a bladder neck contracture we will plan for cystoscopy and dilation bladder neck contracture and catheter placement. .    Cystoscopy Procedure Note    Indications: Diagnosis    Pre-operative Diagnosis: Urinary retention unable place Harper catheter    Post-operative Diagnosis: Same    Surgeon: Helen Whitman MD     Assistants: staff    Anesthesia: Local anesthesia topical 2% lidocaine gel    Procedure Details   The risks, benefits, complications, treatment options, and expected outcomes were discussed with the patient. The patient concurred with the proposed plan, giving informed consent. Cystoscopy was performed today under local anesthesia, using sterile technique. The patient was placed in the supine position, prepped with Hibiclens, and draped in the usual sterile fashion. A 17 [place]grin sheath flexible cystoscope was used to inspect both the urethra and bladder using the flexible scope. Findings:  Anterior urethra: normal without strictures and without scarring. Prostate:  Prostatic urethra: Entering the prostatic urethra with the fossa was widely resected however there was complete occlusion of the bladder neck. This was secondary to contracture. I could not visualize an obvious opening or tract into the bladder. There was a couple small dimples I tried to intubate with a Amplatz guidewire but I could not ever get the guidewire to go. Cystoscopy was then concluded secondary completely obstructed and occluded bladder neck from bladder neck contracture.    Patient will need to go the OR for suprapubic tube placement                            Complications:  None; patient tolerated the procedure well. Disposition: To home after observation. Condition: stable      DATA:  BMP:    Lab Results   Component Value Date     04/15/2021    K 4.0 04/15/2021     04/15/2021    CO2 27 04/15/2021    BUN 12 04/15/2021    CREATININE 0.8 04/15/2021    CALCIUM 8.6 04/15/2021    GFRAA >59 04/15/2021    LABGLOM >60 04/15/2021    GLUCOSE 122 04/15/2021       1. Dysuria  Systolic today shows bladder neck contracture with complete occlusion unable to place a wire across the bladder neck or from true lumen. - Cystoscopy    2. Benign prostatic hyperplasia with urinary retention  Status post TURP with widely open prostatic fossa with occluded bladder neck from bladder neck contracture  - Cystoscopy    3. Urinary retention  Urinary retention with elevated residual secondary to occluded bladder neck and bladder neck contracture. We will get a CT scan of the pelvis without contrast to make sure there is no bowel between the distended bladder and the abdominal wall for preop planning for suprapubic tube placement. He will be taken the operating room then for punch suprapubic tube placement provided he has favorable anatomy on CD. Otherwise he would need to cut down SP tube. - CT PELVIS WO CONTRAST Additional Contrast? None    4. Bladder neck contracture  This is completely occluded unable to place anything across the bladder neck or or find a lumen. Therefore we will place SP tube for bladder decompression come back at a later date or we can do antegrade and retrograde endoscopy i.e. cut to the light to open up this contracture    5. Obstructed, bladder neck  Completely occluded status post TURP as described.         Orders Placed This Encounter   Procedures    CT PELVIS WO CONTRAST Additional Contrast? None     Order Specific Question:   Additional Contrast?     Answer:   None     Order Specific Question: Reason for exam:     Answer:   Urinary retention occluded bladder neck from bladder neck contracture CT for preop planning for SP tube placement    Cystoscopy        Return for PT to be scheduled for Surgery.

## 2021-08-24 NOTE — OP NOTE
Brief operative Note      Patient: Amarilis Dodd  YOB: 1934  MRN: [id number]    Date of Procedure: 8/24/2021    Pre-Op Diagnosis: Bladder neck contracture, urinary retention    Post-Op Diagnosis: Same       Procedure(s):  SUPRAPUBIC TUBE PLACEMENT    Surgeon(s):  Theron Yu MD    Assistant:   * No surgical staff found *    Anesthesia: General    Estimated Blood Loss (mL): 1    Complications: None    Specimens:   * No specimens in log *    Implants:  * No implants in log *      Drains:   Urethral Catheter Triple-lumen 20 fr (Active)       Urethral Catheter Double-lumen 16 fr (Active)       Findings: 16 [place] punch suprapubic tube placed without difficulty good return of urine 700 cc residual    Detailed Description of Procedure:   See dictated report: [id number]    Disposition to PACU then to op care outpatient follow-up in approximate 2 weeks    Electronically signed by Saint Litten, MD on 8/24/2021 at 5:17 PM

## 2021-08-24 NOTE — PROGRESS NOTES
The patient is unable to recall when he last ate or drank. Dr. Jihan Lopes office said it was 9am but that cannot be confirmed. He is also unable to recall what medications he takes and when he last took them. He knows he is allergic to pcn, but doesn't know anything about his medical or surgical hx. Pernell Gilford RN arrived at bedside to assess the pt and situation. She is on her way to speak with Dr. Blayne Avery now.

## 2021-08-24 NOTE — INTERVAL H&P NOTE
Update History & Physical    The patient's History and Physical of August 24, 2021 was reviewed with the patient and I examined the patient. There was no change. The surgical site was confirmed by the patient and me. 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 He is now taken to the OR for suprapubic tube placement due to the fact that if we do not drain his bladder he would be at risk for develop being obstructive uropathy renal failure and potential decline in his medical status or even death. Therefore it is medically necessary to proceed. I spoke to the patient he demonstrated verbal understanding of the procedure. We also discussed the risk including risk of injury to the bowel. He demonstrated verbal understanding of this as well. In addition he does understand that a secondary stage procedure will be necessary to open up the bladder neck at a separate time. He also verbally understood this as well. He gives consent to proceed. Plan: The risks, benefits, expected outcome, and alternative to the recommended procedure have been discussed with the patient. Patient understands and wants to proceed with the procedure.      Electronically signed by Ko Nathan MD on 8/24/2021 at 4:37 PM

## 2021-08-24 NOTE — ANESTHESIA PRE PROCEDURE
Department of Anesthesiology  Preprocedure Note       Name:  Juan Pablo Calderno   Age:  80 y.o.  :  1934                                          MRN:  [id number]         Date:  2021      Surgeon: Rose Costello):  Nico Freitas MD    Procedure: Procedure(s):  SUPRAPUBIC TUBE PLACEMENT    Medications prior to admission:   Prior to Admission medications    Medication Sig Start Date End Date Taking? Authorizing Provider   indomethacin (INDOCIN) 25 MG capsule TAKE 1 CAPSULE BY MOUTH THREE TIMES DAILY AS NEEDED WITH FOOD 21   Historical Provider, MD   diclofenac sodium (VOLTAREN) 1 % GEL APPLY TWO TO FOUR GRAMS TO AFFECTED AREA TWICE DAILY AS NEEDED FOR PAIN 21   Historical Provider, MD   ibuprofen (ADVIL;MOTRIN) 600 MG tablet Take 1 tablet by mouth 2 times daily HOLD THIS MEDICATION FOR AT LEAST 5 DAYS 4/15/21   Marisa Viramontes, APRN - CNP   omeprazole (PRILOSEC) 20 MG delayed release capsule Take 20 mg by mouth daily    Historical Provider, MD   gabapentin (NEURONTIN) 400 MG capsule Take 400 mg by mouth 3 times daily. Historical Provider, MD   guaiFENesin 400 MG tablet Take 400 mg by mouth 4 times daily as needed for Cough    Historical Provider, MD   mirtazapine (REMERON) 30 MG tablet Take 30 mg by mouth nightly    Historical Provider, MD   atorvastatin (LIPITOR) 20 MG tablet Take 20 mg by mouth daily Take 1/2 tab at bedtime.     Historical Provider, MD   verapamil (CALAN) 120 MG tablet Take 120 mg by mouth 2 times daily    Historical Provider, MD   donepezil (ARICEPT) 10 MG tablet Take 10 mg by mouth nightly    Historical Provider, MD   melatonin 3 MG TABS tablet Take 3 mg by mouth nightly as needed Take 2 hs prn    Historical Provider, MD   metFORMIN (GLUCOPHAGE) 1000 MG tablet Take 1,000 mg by mouth 2 times daily (with meals)    Historical Provider, MD       Current medications:    Current Facility-Administered Medications   Medication Dose Route Frequency Provider Last Rate Last Admin    ceFAZolin from Last 3 Encounters:   08/24/21 145 lb (65.8 kg)   08/24/21 145 lb 8 oz (66 kg)   04/12/21 154 lb (69.9 kg)     Body mass index is 22.71 kg/m². CBC:   Lab Results   Component Value Date    WBC 7.8 04/15/2021    RBC 3.80 04/15/2021    HGB 11.4 04/15/2021    HCT 34.8 04/15/2021    MCV 91.6 04/15/2021    RDW 13.5 04/15/2021     04/15/2021       CMP:   Lab Results   Component Value Date     04/15/2021    K 4.0 04/15/2021     04/15/2021    CO2 27 04/15/2021    BUN 12 04/15/2021    CREATININE 0.8 04/15/2021    GFRAA >59 04/15/2021    LABGLOM >60 04/15/2021    GLUCOSE 122 04/15/2021    CALCIUM 8.6 04/15/2021       POC Tests: No results for input(s): POCGLU, POCNA, POCK, POCCL, POCBUN, POCHEMO, POCHCT in the last 72 hours. Coags:   Lab Results   Component Value Date    PROTIME 14.9 04/12/2021    INR 1.17 04/12/2021    APTT 26.8 04/12/2021       HCG (If Applicable): No results found for: PREGTESTUR, PREGSERUM, HCG, HCGQUANT     ABGs: No results found for: PHART, PO2ART, ECH1FYB, WHU8GAC, BEART, M9NKRORZ     Type & Screen (If Applicable):  No results found for: LABABO, LABRH    Drug/Infectious Status (If Applicable):  No results found for: HIV, HEPCAB    COVID-19 Screening (If Applicable):   Lab Results   Component Value Date    COVID19 Not Detected 04/12/2021           Anesthesia Evaluation  Patient summary reviewed and Nursing notes reviewed no history of anesthetic complications:   Airway: Mallampati: II  TM distance: >3 FB   Neck ROM: full  Mouth opening: > = 3 FB Dental: normal exam         Pulmonary:Negative Pulmonary ROS and normal exam  breath sounds clear to auscultation      (-) shortness of breath and not a current smoker          Patient did not smoke on day of surgery.                  Cardiovascular:    (+) hypertension:,     (-) CAD,  angina and  CHF    NYHA Classification: I  ECG reviewed  Rhythm: regular  Rate: normal           Beta Blocker:  Not on Beta Blocker         Neuro/Psych:

## 2021-08-24 NOTE — PROGRESS NOTES
(NEURONTIN) 400 MG capsule Take 400 mg by mouth 3 times daily.  guaiFENesin 400 MG tablet Take 400 mg by mouth 4 times daily as needed for Cough      mirtazapine (REMERON) 30 MG tablet Take 30 mg by mouth nightly      atorvastatin (LIPITOR) 20 MG tablet Take 20 mg by mouth daily Take 1/2 tab at bedtime.  verapamil (CALAN) 120 MG tablet Take 120 mg by mouth 2 times daily      donepezil (ARICEPT) 10 MG tablet Take 10 mg by mouth nightly      melatonin 3 MG TABS tablet Take 3 mg by mouth nightly as needed Take 2 hs prn      metFORMIN (GLUCOPHAGE) 1000 MG tablet Take 1,000 mg by mouth 2 times daily (with meals)       No current facility-administered medications for this visit. Allergies   Allergen Reactions    Pcn [Penicillins] Hives     \"Huge dose of PCN\"       Social History     Socioeconomic History    Marital status:      Spouse name: None    Number of children: 0    Years of education: None    Highest education level: None   Occupational History    None   Tobacco Use    Smoking status: Former Smoker     Types: Cigars     Quit date:      Years since quittin.6    Smokeless tobacco: Never Used   Vaping Use    Vaping Use: Never used   Substance and Sexual Activity    Alcohol use: Not Currently    Drug use: Never    Sexual activity: None   Other Topics Concern    None   Social History Narrative    None     Social Determinants of Health     Financial Resource Strain:     Difficulty of Paying Living Expenses:    Food Insecurity:     Worried About Running Out of Food in the Last Year:     Ran Out of Food in the Last Year:    Transportation Needs:     Lack of Transportation (Medical):      Lack of Transportation (Non-Medical):    Physical Activity:     Days of Exercise per Week:     Minutes of Exercise per Session:    Stress:     Feeling of Stress :    Social Connections:     Frequency of Communication with Friends and Family:     Frequency of Social Gatherings with Friends and Family:     Attends Sikhism Services:     Active Member of Clubs or Organizations:     Attends Club or Organization Meetings:     Marital Status:    Intimate Partner Violence:     Fear of Current or Ex-Partner:     Emotionally Abused:     Physically Abused:     Sexually Abused:        History reviewed. No pertinent family history. REVIEW OF SYSTEMS:  Review of Systems   Constitutional: Negative for chills and fever. Gastrointestinal: Negative for abdominal distention, abdominal pain, nausea and vomiting. Genitourinary: Positive for difficulty urinating and dysuria. Negative for flank pain, frequency, hematuria and urgency. Musculoskeletal: Negative for back pain and gait problem. Psychiatric/Behavioral: Negative for agitation and confusion. Bladder Scan interpretation  Estimation of residual urine via abdominal ultrasound  Residual Urine: 408 ml  Indication: dysuria  Position: Supine  Examination: Incremental scanning of the suprapubic area using 3 MHz transducer using copious amounts of acoustic gel. Findings: An anechoic area was demonstrated which represented the bladder, with measurement of residual urine as noted. PHYSICAL EXAM:  Temp 98.1 °F (36.7 °C) (Temporal)   Ht 5' 7\" (1.702 m)   Wt 145 lb 8 oz (66 kg)   BMI 22.79 kg/m²   Physical Exam  Vitals and nursing note reviewed. Constitutional:       General: He is not in acute distress. Appearance: Normal appearance. He is not ill-appearing. HENT:      Ears:      Comments: Hard of hearing  Pulmonary:      Effort: Pulmonary effort is normal. No respiratory distress. Abdominal:      General: There is no distension. Tenderness: There is no abdominal tenderness. There is no right CVA tenderness or left CVA tenderness. Genitourinary:     Penis: Circumcised. Tenderness present. No erythema, discharge, swelling or lesions. Neurological:      Mental Status: He is alert. Mental status is at baseline.  He is disoriented. Comments: Confusion at baseline, forgetful   Psychiatric:         Mood and Affect: Mood normal.         Behavior: Behavior normal.       DATA:    Results for orders placed or performed in visit on 08/24/21   POCT Urinalysis Dipstick w/ Micro (Auto)   Result Value Ref Range    Color, UA Yellow     Clarity, UA Clear Clear    Glucose, Ur 500     Bilirubin Urine 0 mg/dL    Ketones, Urine Positive     Specific Gravity, UA 1.020 1.005 - 1.030    Blood, Urine Positive     pH, UA 6.0 4.5 - 8.0    Protein, UA Negative Negative    Nitrite, Urine Negative     Leukocytes, UA neg     Urobilinogen, Urine Normal     rbc urine, poc 2     wbc urine, poc 6     bacteria urine, poc 0     yeast urine, poc 0     casts urine, poc 0     epi cells urine, poc 0     crystals urine, poc 0      No results found for: PSA  No results found for: PSAFREEPCT      1. Dysuria  UA appears noninfected today. Likely secondary to stricture/contracture. - NE Measure, post-void residual, US, non-imaging    2. Benign prostatic hyperplasia with urinary retention  Doing well initially postoperatively TURP. Complains of difficulty urinating as well as high residual today at greater than 408 mL. He does have a floppy bladder but he was able to empty his bladder at last visit. 3.  Bladder neck contracture  In and out catheterization using 14 [place]anie coudé, 16 [place]anie coudé, 20 Western [place]anie coudé, 20 [place]anie straight cath were all unsuccessful. Dr. Celestina Slaughter was notified and cystoscopy in office was completed. See Dr. Robb Benjamin note per cystoscopy evaluation. Orders Placed This Encounter   Procedures    POCT Urinalysis Dipstick w/ Micro (Auto)    NE Measure, post-void residual, US, non-imaging        No follow-ups on file.     All information inputted into the note by the MA to include chief complaint, past medical history, past surgical history, medications, allergies, social and family history and review of systems has been reviewed and updated as needed by me. EMR Dragon/transcription disclaimer: Much of this documentt is electronic  transcription/translation of spoken language to printed text. The  electronic translation of spoken language may be erroneous, or at times,  nonsensical words or phrases may be inadvertently transcribed.  Although I  have reviewed the document for such errors, some may still exist.

## 2021-08-24 NOTE — ANESTHESIA POSTPROCEDURE EVALUATION
Department of Anesthesiology  Postprocedure Note    Patient: Lora Aguilar  MRN: [id number]  YOB: 1934  Date of evaluation: 8/24/2021  Time:  5:14 PM     Procedure Summary     Date: 08/24/21 Room / Location: [facility]    Anesthesia Start: 1640 Anesthesia Stop: 9917    Procedure: SUPRAPUBIC TUBE PLACEMENT (N/A ) Diagnosis: (PLACEMENT OF SUPERPUBIC CATH)    Surgeons: Vilma Yao MD Responsible Provider: BRYAN Hernandez CRNA    Anesthesia Type: general, MAC ASA Status: 3          Anesthesia Type: general, MAC    Sheri Phase I: Sheri Score: 10    Sheri Phase II:      Last vitals: Reviewed and per EMR flowsheets.        Anesthesia Post Evaluation    Patient location during evaluation: PACU  Patient participation: waiting for patient participation  Level of consciousness: sleepy but conscious  Pain score: 0  Airway patency: patent  Nausea & Vomiting: no nausea and no vomiting  Complications: no  Cardiovascular status: hemodynamically stable and blood pressure returned to baseline  Respiratory status: acceptable and face mask  Hydration status: stable

## 2021-08-24 NOTE — H&P (VIEW-ONLY)
Family:     Attends Mu-ism Services:     Active Member of Clubs or Organizations:     Attends Club or Organization Meetings:     Marital Status:    Intimate Partner Violence:     Fear of Current or Ex-Partner:     Emotionally Abused:     Physically Abused:     Sexually Abused:        No family history on file. REVIEW OF SYSTEMS:  Review of Systems   Constitutional: Negative for chills and fever. Gastrointestinal: Negative for abdominal distention, abdominal pain, nausea and vomiting. Genitourinary: Positive for difficulty urinating and dysuria. Negative for flank pain, frequency, hematuria and urgency. Musculoskeletal: Negative for back pain and gait problem. Psychiatric/Behavioral: Negative for agitation and confusion. Bladder Scan interpretation  Estimation of residual urine via abdominal ultrasound  Residual Urine: 408 ml  Indication: dysuria  Position: Supine  Examination: Incremental scanning of the suprapubic area using 3 MHz transducer using copious amounts of acoustic gel. Findings: An anechoic area was demonstrated which represented the bladder, with measurement of residual urine as noted. PHYSICAL EXAM:  There were no vitals taken for this visit. Physical Exam  Vitals and nursing note reviewed. Constitutional:       General: He is not in acute distress. Appearance: Normal appearance. He is not ill-appearing. HENT:      Ears:      Comments: Hard of hearing  Pulmonary:      Effort: Pulmonary effort is normal. No respiratory distress. Abdominal:      General: There is no distension. Tenderness: There is no abdominal tenderness. There is no right CVA tenderness or left CVA tenderness. Genitourinary:     Penis: Circumcised. Tenderness present. No erythema, discharge, swelling or lesions. Neurological:      Mental Status: He is alert. Mental status is at baseline. He is disoriented.       Comments: Confusion at baseline, forgetful   Psychiatric:         Mood and Affect: Mood normal.         Behavior: Behavior normal.       DATA:  8/24/2021  2:21 PM -    Component Value Ref Range & Units Status Collected Lab   Color, UA Yellow   Final 08/24/2021  2:20 PM Unknown   Clarity, UA Clear  Clear Final 08/24/2021  2:20 PM Unknown   Glucose, Ur 500   Final 08/24/2021  2:20 PM Unknown   Bilirubin Urine 0  mg/dL Final 08/24/2021  2:20 PM Unknown   Ketones, Urine Positive   Final 08/24/2021  2:20 PM Unknown   trace   Specific Gravity, UA 1.020  1.005 - 1.030 Final 08/24/2021  2:20 PM Unknown   Blood, Urine Positive   Final 08/24/2021  2:20 PM Unknown   trace   pH, UA 6.0  4.5 - 8.0 Final 08/24/2021  2:20 PM Unknown   Protein, UA Negative  Negative Final 08/24/2021  2:20 PM Unknown   Nitrite, Urine Negative   Final 08/24/2021  2:20 PM Unknown   Leukocytes, UA neg   Final 08/24/2021  2:20 PM Unknown   Urobilinogen, Urine Normal   Final 08/24/2021  2:20 PM Unknown   rbc urine, poc 2   Final 08/24/2021  2:20 PM Unknown   wbc urine, poc 6   Final 08/24/2021  2:20 PM Unknown   bacteria urine, poc 0   Final 08/24/2021  2:20 PM Unknown   yeast urine, poc 0   Final 08/24/2021  2:20 PM Unknown   casts urine, poc 0   Final 08/24/2021  2:20 PM Unknown   epi cells urine, poc 0   Final 08/24/2021  2:20 PM Unknown   crystals urine, poc 0   Final 08/24/2021  2:20 PM Unknown   Lab and Collection      No results found for this visit on 08/24/21. No results found for: PSA  No results found for: PSAFREEPCT      1. Dysuria  UA appears noninfected today. Likely secondary to stricture/contracture. - MT Measure, post-void residual, US, non-imaging    2. Benign prostatic hyperplasia with urinary retention  Doing well initially postoperatively TURP. Complains of difficulty urinating as well as high residual today at greater than 408 mL. He does have a floppy bladder but he was able to empty his bladder at last visit.     3.  Bladder neck contracture  In and out catheterization using 14 Western Lanie coudé, 16 Western Lanie coudé, 20 [place], 20 Western Lanie straight cath were all unsuccessful. Dr. Pablo Darling was notified and cystoscopy in office was completed. See Dr. Demond Hill note per cystoscopy evaluation. No orders of the defined types were placed in this encounter. No follow-ups on file. All information inputted into the note by the MA to include chief complaint, past medical history, past surgical history, medications, allergies, social and family history and review of systems has been reviewed and updated as needed by me. EMR Dragon/transcription disclaimer: Much of this documentt is electronic  transcription/translation of spoken language to printed text. The  electronic translation of spoken language may be erroneous, or at times,  nonsensical words or phrases may be inadvertently transcribed.  Although I  have reviewed the document for such errors, some may still exist.

## 2021-08-24 NOTE — H&P
Dina Morales is a 80 y.o. male who presents today   No chief complaint on file. Patient is an 44-year-old male presents the clinic today with complaints of dysuria at the end of his penis this is constant has been present for at least 3 weeks. He feels as if he has a broken stream and he is trying to push something out. Recently went to urgent care and was apparently treated for UTI but patient does not remember if he took his medication. History of TURP on 4/14/2021. He does have a floppy bladder and failed several voiding trials after TURP although last voiding trial he did pass with a postvoid residual of 40. He does have an intermittent thin stream.  No longer on prostate medications that he knows of. History of dementia. He does live alone but his neighbor does help out occasionally. Past Medical History:   Diagnosis Date    Arthritis     Diabetes mellitus ([address]     History of blood transfusion     Hyperlipidemia     Hypertension     Immunization due     Covid Vaccine 3/1/2021 and 3/30/2021    Neuropathy        Past Surgical History:   Procedure Laterality Date    COLONOSCOPY      ENDOSCOPY, COLON, DIAGNOSTIC      EYE SURGERY Bilateral     cataract    NOSE SURGERY      SKIN BIOPSY      TONSILLECTOMY      TURP N/A 4/14/2021    TRANSURETHRAL RESECTION PROSTATE performed by Anabell Greenfield MD at [facility] OR       Current Outpatient Medications   Medication Sig Dispense Refill    indomethacin (INDOCIN) 25 MG capsule TAKE 1 CAPSULE BY MOUTH THREE TIMES DAILY AS NEEDED WITH FOOD      diclofenac sodium (VOLTAREN) 1 % GEL APPLY TWO TO FOUR GRAMS TO AFFECTED AREA TWICE DAILY AS NEEDED FOR PAIN      ibuprofen (ADVIL;MOTRIN) 600 MG tablet Take 1 tablet by mouth 2 times daily HOLD THIS MEDICATION FOR AT LEAST 5 DAYS 120 tablet 3    omeprazole (PRILOSEC) 20 MG delayed release capsule Take 20 mg by mouth daily      gabapentin (NEURONTIN) 400 MG capsule Take 400 mg by mouth 3 times daily.       guaiFENesin 400 MG tablet Take 400 mg by mouth 4 times daily as needed for Cough      mirtazapine (REMERON) 30 MG tablet Take 30 mg by mouth nightly      atorvastatin (LIPITOR) 20 MG tablet Take 20 mg by mouth daily Take 1/2 tab at bedtime.  verapamil (CALAN) 120 MG tablet Take 120 mg by mouth 2 times daily      donepezil (ARICEPT) 10 MG tablet Take 10 mg by mouth nightly      melatonin 3 MG TABS tablet Take 3 mg by mouth nightly as needed Take 2 hs prn      metFORMIN (GLUCOPHAGE) 1000 MG tablet Take 1,000 mg by mouth 2 times daily (with meals)       No current facility-administered medications for this visit. Allergies   Allergen Reactions    Pcn [Penicillins] Hives     \"Huge dose of PCN\"       Social History     Socioeconomic History    Marital status:      Spouse name: Not on file    Number of children: 0    Years of education: Not on file    Highest education level: Not on file   Occupational History    Not on file   Tobacco Use    Smoking status: Former Smoker     Types: Cigars     Quit date:      Years since quittin.6    Smokeless tobacco: Never Used   Vaping Use    Vaping Use: Never used   Substance and Sexual Activity    Alcohol use: Not Currently    Drug use: Never    Sexual activity: Not on file   Other Topics Concern    Not on file   Social History Narrative    Not on file     Social Determinants of Health     Financial Resource Strain:     Difficulty of Paying Living Expenses:    Food Insecurity:     Worried About [facility] in the Last Year:     [address]  in the Last Year:    Transportation Needs:     Lack of Transportation (Medical):      Lack of Transportation (Non-Medical):    Physical Activity:     Days of Exercise per Week:     Minutes of Exercise per Session:    Stress:     Feeling of Stress :    Social Connections:     Frequency of Communication with Friends and Family:     Frequency of Social Gatherings with Friends and Family:     Attends Orthodoxy Services:     Active Member of Clubs or Organizations:     Attends Club or Organization Meetings:     Marital Status:    Intimate Partner Violence:     Fear of Current or Ex-Partner:     Emotionally Abused:     Physically Abused:     Sexually Abused:        No family history on file. REVIEW OF SYSTEMS:  Review of Systems   Constitutional: Negative for chills and fever. Gastrointestinal: Negative for abdominal distention, abdominal pain, nausea and vomiting. Genitourinary: Positive for difficulty urinating and dysuria. Negative for flank pain, frequency, hematuria and urgency. Musculoskeletal: Negative for back pain and gait problem. Psychiatric/Behavioral: Negative for agitation and confusion. Bladder Scan interpretation  Estimation of residual urine via abdominal ultrasound  Residual Urine: 408 ml  Indication: dysuria  Position: Supine  Examination: Incremental scanning of the suprapubic area using 3 MHz transducer using copious amounts of acoustic gel. Findings: An anechoic area was demonstrated which represented the bladder, with measurement of residual urine as noted. PHYSICAL EXAM:  There were no vitals taken for this visit. Physical Exam  Vitals and nursing note reviewed. Constitutional:       General: He is not in acute distress. Appearance: Normal appearance. He is not ill-appearing. HENT:      Ears:      Comments: Hard of hearing  Pulmonary:      Effort: Pulmonary effort is normal. No respiratory distress. Abdominal:      General: There is no distension. Tenderness: There is no abdominal tenderness. There is no right CVA tenderness or left CVA tenderness. Genitourinary:     Penis: Circumcised. Tenderness present. No erythema, discharge, swelling or lesions. Neurological:      Mental Status: He is alert. Mental status is at baseline. He is disoriented.       Comments: Confusion at baseline, forgetful   Psychiatric:         Mood and Western Lanie coudé, 20 [place], 20 Western Lanie straight cath were all unsuccessful. Dr. Mina Guzman was notified and cystoscopy in office was completed. See Dr. Dustin Ronquillo note per cystoscopy evaluation. No orders of the defined types were placed in this encounter. No follow-ups on file. All information inputted into the note by the MA to include chief complaint, past medical history, past surgical history, medications, allergies, social and family history and review of systems has been reviewed and updated as needed by me. EMR Dragon/transcription disclaimer: Much of this documentt is electronic  transcription/translation of spoken language to printed text. The  electronic translation of spoken language may be erroneous, or at times,  nonsensical words or phrases may be inadvertently transcribed.  Although I  have reviewed the document for such errors, some may still exist.

## 2021-08-25 NOTE — OP NOTE
DONALDKRIST[facility] [employer] [facility] [address], [address] [facility]                                OPERATIVE REPORT    PATIENT NAME: Yuko Castellanos                      :        1934  MED REC NO:   [id number]                              ROOM:  ACCOUNT NO:   [de-identified]                           ADMIT DATE: 2021  PROVIDER:     David Gilbert MD    DATE OF PROCEDURE:  2021    TITLE OF OPERATION:  Suprapubic tube placement. PREOPERATIVE DIAGNOSES:  1.  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 2.  Urinary retention. POSTOPERATIVE DIAGNOSES:  1.  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 2.  Urinary retention. ANESTHETIC:  General anesthetic. ATTENDING SURGEON:  David Gilbert MD    HISTORY:  The patient is an 72-year-old gentleman who underwent a TURP  on 2021. He came to the office today complaining of dysuria and  inability to void over the last three weeks. In the office, we had  elevated residual by bladder scan over 400. Attempts by the nurse  practitioners in the office were unsuccessful placing a catheter. I  scoped him in the office and he had a bladder neck contracture that  completely occluded his bladder neck opening and I could not make all  the way into the bladder cystoscopically. Therefore, a CT scan of the  abdomen and pelvis was obtained, which showed his bladder to be  distended just under the abdominal wall, there was no bowel in between  the bladder wall and the abdominal wall, so it was felt that, it was  safe to proceed with a suprapubic tube placement. He did have a normal  creatinine of 0.7. So, therefore, he presents to undergo a punch  suprapubic tube placement. The risks and complications of the procedure  were discussed with him and he had verbal understanding. He consents to  proceed.     DESCRIPTION OF PROCEDURE:  The patient was brought to the operating  room, underwent sedation by the anesthetist.  His abdomen was then  shaved, prepped and draped in routine sterile fashion. I marked 3-1/2  fingerbreadths above the pubic symphysis in the midline. His bladder  was palpable and distended. He was placed in reverse Trendelenburg. I  then pushed on the abdominal wall to make sure that I felt nothing in  between except for the distended bladder. Local anesthetic with  lidocaine was then used to infiltrate the skin and subcutaneous tissue  and then down to the fascia. In doing so, I was able to poke a needle  through the bladder and I did use a long spinal needle as a seeker  needle and after about 2 inches in, I was easily able to get return of  clear urine as I aspirated. I then used the _____ punch suprapubic  placement. This consisted of a sharp trocar with a plastic sheath. This was directed in the midline, just slightly _____ in and initially  got some urine, but when I pulled the introducer out, I got no drainage  through the sheath, so I went a little deeper and then got free flow of  urine. The introducer was removed and there was free flow of urine  through the sheath. A 16-Lao Harper catheter was then inserted  through the sheath into the patient's bladder; 10 mL was placed in the  balloon. The sheath was then peeled away. The catheter was then seated  up against the bladder wall and irrigated easily with a syringe. I then  secured the catheter to the skin using some 0 silk suture. Approximately, 700 mL of dark italia urine was drained from the patient's  bladder. Sterile dressing was then placed. Estimated blood loss was 1  mL. The patient was awakened from his anesthetic and taken to the  recovery room in stable condition. He will follow up to see us in 2  weeks, at which point we will schedule him for antegrade and retrograde  cystoscopy with opening of the bladder neck contracture \"cut to the  light\" procedure.         Van Mann MD    D: 08/24/2021 18:26:39      T: 08/25/2021 2:19:30     [email or web address]  Job#: [id number]     Doc#: [id number]    CC:

## 2021-09-09 ENCOUNTER — OFFICE VISIT (OUTPATIENT)
Dept: UROLOGY | Age: 86
End: 2021-09-09
Payer: MEDICARE

## 2021-09-09 DIAGNOSIS — R33.8 BENIGN PROSTATIC HYPERPLASIA WITH URINARY RETENTION: Primary | ICD-10-CM

## 2021-09-09 DIAGNOSIS — N40.1 BENIGN PROSTATIC HYPERPLASIA WITH URINARY RETENTION: Primary | ICD-10-CM

## 2021-09-09 DIAGNOSIS — R33.9 URINARY RETENTION: ICD-10-CM

## 2021-09-09 DIAGNOSIS — N32.0 BLADDER NECK CONTRACTURE: ICD-10-CM

## 2021-09-09 PROCEDURE — 1123F ACP DISCUSS/DSCN MKR DOCD: CPT | Performed by: UROLOGY

## 2021-09-09 PROCEDURE — G8420 CALC BMI NORM PARAMETERS: HCPCS | Performed by: UROLOGY

## 2021-09-09 PROCEDURE — G8427 DOCREV CUR MEDS BY ELIG CLIN: HCPCS | Performed by: UROLOGY

## 2021-09-09 PROCEDURE — 1036F TOBACCO NON-USER: CPT | Performed by: UROLOGY

## 2021-09-09 PROCEDURE — 4040F PNEUMOC VAC/ADMIN/RCVD: CPT | Performed by: UROLOGY

## 2021-09-09 PROCEDURE — 99214 OFFICE O/P EST MOD 30 MIN: CPT | Performed by: UROLOGY

## 2021-09-09 ASSESSMENT — ENCOUNTER SYMPTOMS
COUGH: 0
VOMITING: 0
BACK PAIN: 0
WHEEZING: 0
SORE THROAT: 0
NAUSEA: 0
EYE PAIN: 0

## 2021-09-09 NOTE — PROGRESS NOTES
Hudson Stroud is a 80 y.o. male who presents today   Chief Complaint   Patient presents with    Post-Op Check     I am here for my post op visit and to discuss my next surgery     Patient is status post TURP done on 4/14/2021. He came in to see the nurse practitioner on 8/24/2021 with complaints of dysuria and not been able to void getting worse over the last 3 weeks. Bladder scan in the office revealed a residual of 408 cc on exam he had some bladder distention. Attempts by the practitioner to place Harper catheter were unsuccessful. Office cystoscopy done that same day on 8/24/2021 showed complete occlusion of his bladder neck consistent with a contracture. Therefore he was taken the operating room and underwent suprapubic tube placement. 700 cc was drained from his bladder. He now comes in for follow-up to discuss further options of care. He is complaining of some discomfort at the suprapubic tube site but he mostly complains of the suprapubic tube coming loose were connected to the tubing. This is happened to him a few times at night. He denies any hematuria no abdominal pain no fever.         Past Medical History:   Diagnosis Date    Arthritis     Diabetes mellitus [address] [address]     History of blood transfusion     Hyperlipidemia     Hypertension     Immunization due     Neuropathy        Past Surgical History:   Procedure Laterality Date    COLONOSCOPY      CYSTOSCOPY N/A 8/24/2021    SUPRAPUBIC TUBE PLACEMENT performed by Adeola Ayers MD at [address] [place] [address] ENDOSCOPY, COLON, DIAGNOSTIC      EYE SURGERY Bilateral     cataract    NOSE SURGERY      SKIN BIOPSY      TONSILLECTOMY      TURP N/A 4/14/2021    TRANSURETHRAL RESECTION PROSTATE performed by Adeola Ayers MD at [address] [place] [address] OR       Current Outpatient Medications   Medication Sig Dispense Refill    indomethacin (INDOCIN) 25 MG capsule TAKE 1 CAPSULE BY MOUTH THREE TIMES DAILY AS NEEDED WITH FOOD      diclofenac sodium (VOLTAREN) 1 % GEL APPLY TWO TO FOUR GRAMS TO AFFECTED AREA TWICE DAILY AS NEEDED FOR PAIN      acetaminophen (TYLENOL) 500 MG tablet Take 2 tablets by mouth 4 times daily as needed for Pain 1 tablet 0    ibuprofen (ADVIL;MOTRIN) 600 MG tablet Take 1 tablet by mouth 2 times daily HOLD THIS MEDICATION FOR AT LEAST 5 DAYS 120 tablet 3    omeprazole (PRILOSEC) 20 MG delayed release capsule Take 20 mg by mouth daily      gabapentin (NEURONTIN) 400 MG capsule Take 400 mg by mouth 3 times daily.  guaiFENesin 400 MG tablet Take 400 mg by mouth 4 times daily as needed for Cough      mirtazapine (REMERON) 30 MG tablet Take 30 mg by mouth nightly      atorvastatin (LIPITOR) 20 MG tablet Take 20 mg by mouth daily Take 1/2 tab at bedtime.  verapamil (CALAN) 120 MG tablet Take 120 mg by mouth 2 times daily      donepezil (ARICEPT) 10 MG tablet Take 10 mg by mouth nightly      melatonin 3 MG TABS tablet Take 3 mg by mouth nightly as needed Take 2 hs prn      metFORMIN (GLUCOPHAGE) 1000 MG tablet Take 1,000 mg by mouth 2 times daily (with meals)       No current facility-administered medications for this visit. Allergies   Allergen Reactions    Pcn [Penicillins] Hives     \"Huge dose of PCN\"       Social History     Socioeconomic History    Marital status:       Spouse name: None    Number of children: 0    Years of education: None    Highest education level: None   Occupational History    None   Tobacco Use    Smoking status: Former Smoker     Types: Cigars     Quit date:      Years since quittin.7    Smokeless tobacco: Never Used   Vaping Use    Vaping Use: Never used   Substance and Sexual Activity    Alcohol use: Not Currently    Drug use: Never    Sexual activity: None   Other Topics Concern    None   Social History Narrative    None     Social Determinants of Health     Financial Resource Strain:     Difficulty of Paying Living Expenses:    Food Insecurity:     Worried About Running Out of Food in the Last Year:    [address] in the Last Year:    Transportation Needs:     Lack of Transportation (Medical):  Lack of Transportation (Non-Medical):    Physical Activity:     Days of Exercise per Week:     Minutes of Exercise per Session:    Stress:     Feeling of Stress :    Social Connections:     Frequency of Communication with Friends and Family:     Frequency of Social Gatherings with Friends and Family:     Attends Christian Services:     Active Member of Clubs or Organizations:     Attends Club or Organization Meetings:     Marital Status:    Intimate Partner Violence:     Fear of Current or Ex-Partner:     Emotionally Abused:     Physically Abused:     Sexually Abused:        History reviewed. No pertinent family history. REVIEW OF SYSTEMS:  Review of Systems   Constitutional: Negative for chills and fever. HENT: Negative for congestion and sore throat. Eyes: Negative for pain and visual disturbance. Respiratory: Negative for cough and wheezing. Cardiovascular: Negative for chest pain and palpitations. Gastrointestinal: Negative for nausea and vomiting. Endocrine: Negative for polyphagia and polyuria. Genitourinary: Positive for difficulty urinating (has SP cath (no UA needed) ). Negative for decreased urine volume, discharge, dysuria, enuresis, flank pain, frequency, genital sores, hematuria, penile pain, penile swelling, scrotal swelling, testicular pain and urgency. Musculoskeletal: Negative for back pain and neck pain. Skin: Negative for rash and wound. Allergic/Immunologic: Negative for environmental allergies and food allergies. Neurological: Negative for dizziness and headaches. Hematological: Negative for adenopathy. Does not bruise/bleed easily. Psychiatric/Behavioral: Negative for confusion and hallucinations. All other systems reviewed and are negative. PHYSICAL EXAM:  There were no vitals taken for this visit.   Physical Exam  Constitutional:       General: He is not in acute distress. Appearance: Normal appearance. He is well-developed. HENT:      Head: Normocephalic and atraumatic. Nose: Nose normal.   Eyes:      General: No scleral icterus. Conjunctiva/sclera: Conjunctivae normal.      Pupils: Pupils are equal, round, and reactive to light. Neck:      Trachea: No tracheal deviation. Cardiovascular:      Rate and Rhythm: Normal rate and regular rhythm. Pulmonary:      Effort: Pulmonary effort is normal. No respiratory distress. Breath sounds: No stridor. Abdominal:      General: There is no distension. Palpations: Abdomen is soft. There is no mass. Tenderness: There is no abdominal tenderness. Comments: Suprapubic tube site shows some expected skin reaction from the catheter. No exudate mild tenderness. SP tube is draining clear urine   Genitourinary:     Penis: Normal.       Testes: Normal.   Musculoskeletal:         General: No tenderness. Normal range of motion. Cervical back: Normal range of motion and neck supple. Lymphadenopathy:      Cervical: No cervical adenopathy. Skin:     General: Skin is warm and dry. Findings: No erythema. Neurological:      Mental Status: He is alert and oriented to person, place, and time.    Psychiatric:         Behavior: Behavior normal.         Judgment: Judgment normal.             DATA:  CBC:   Lab Results   Component Value Date    WBC 10.8 08/24/2021    RBC 4.81 08/24/2021    HGB 13.5 08/24/2021    HCT 42.7 08/24/2021    MCV 88.8 08/24/2021    MCH 28.1 08/24/2021    MCHC 31.6 08/24/2021    RDW 14.2 08/24/2021     08/24/2021    MPV 10.9 08/24/2021     BMP:    Lab Results   Component Value Date     08/24/2021    K 3.8 08/24/2021    K 4.0 04/15/2021     08/24/2021    CO2 25 08/24/2021    BUN 16 08/24/2021    CREATININE 0.7 08/24/2021    CALCIUM 9.7 08/24/2021    GFRAA >59 08/24/2021    LABGLOM >60 08/24/2021 GLUCOSE 105 08/24/2021     1. Benign prostatic hyperplasia with urinary retention  He status post TURP. He has now developed bladder neck contracture with complete occlusion. 2. Bladder neck contracture  Cystoscopy showed complete occlusion of the bladder neck this was managed with SP tube placement. Now that SP tube has been in place for a few weeks to track should be mature and I recommend that we attempt a \"cut to the light\" procedure to see if we can get his bladder neck open. We will plan for flexible cystoscopy through the suprapubic tube site and transurethral resection incision and opening of the bladder neck. I did discuss with the patient that this has significant risk for recurrent scarring we discussed the risk of injury to adjacent organs. I told him I would leave the suprapubic tube in place for period of time to make sure the contracture does not grow back after resection. We also discussed the option of just chronic SP tube with changes every month. He wants to attempt to try to be without a tube. 3. Urinary retention  He does understand that he had urinary retention preop TURP and despite resection of bladder neck contracture he still may have retention postop due to poor detrusor contractility, atonic bladder      No orders of the defined types were placed in this encounter. Return for PT to be scheduled for Surgery. All information inputted into the note by the MA to include chief complaint, past medical history, past surgical history, medications, allergies, social and family history and review of systems has been reviewed and updated as needed by me. EMR Dragon/transcription disclaimer: Much of this documentt is electronic  transcription/translation of spoken language to printed text. The  electronic translation of spoken language may be erroneous, or at times,  nonsensical words or phrases may be inadvertently transcribed.  Although I  have reviewed the document for such errors, some may still exist.

## 2021-09-15 ENCOUNTER — HOSPITAL ENCOUNTER (OUTPATIENT)
Dept: PREADMISSION TESTING | Age: 86
Discharge: HOME | DRG: 312 | End: 2021-09-19
Payer: MEDICARE

## 2021-09-15 VITALS — WEIGHT: 140 LBS | HEIGHT: 67 IN | BODY MASS INDEX: 21.97 KG/M2

## 2021-09-15 LAB
ANION GAP SERPL CALCULATED.3IONS-SCNC: 10 MMOL/L (ref 7–19)
APTT: 25.4 SEC (ref 26–36.2)
BASOPHILS ABSOLUTE: 0 K/UL (ref 0–0.2)
BASOPHILS RELATIVE PERCENT: 0.3 % (ref 0–1)
BUN BLDV-MCNC: 20 MG/DL (ref 8–23)
CALCIUM SERPL-MCNC: 9.4 MG/DL (ref 8.8–10.2)
CHLORIDE BLD-SCNC: 101 MMOL/L (ref 98–111)
CO2: 27 MMOL/L (ref 22–29)
CREAT SERPL-MCNC: 0.6 MG/DL (ref 0.5–1.2)
EKG P AXIS: NORMAL DEGREES
EKG P-R INTERVAL: NORMAL MS
EKG Q-T INTERVAL: 382 MS
EKG QRS DURATION: 94 MS
EKG QTC CALCULATION (BAZETT): 399 MS
EKG T AXIS: 75 DEGREES
EOSINOPHILS ABSOLUTE: 0.1 K/UL (ref 0–0.6)
EOSINOPHILS RELATIVE PERCENT: 0.8 % (ref 0–5)
GFR AFRICAN AMERICAN: >59
GFR NON-AFRICAN AMERICAN: >60
GLUCOSE BLD-MCNC: 111 MG/DL (ref 74–109)
HCT VFR BLD CALC: 43.3 % (ref 42–52)
HEMOGLOBIN: 13.6 G/DL (ref 14–18)
IMMATURE GRANULOCYTES #: 0 K/UL
INR BLD: 1.11 (ref 0.88–1.18)
LYMPHOCYTES ABSOLUTE: 1.3 K/UL (ref 1.1–4.5)
LYMPHOCYTES RELATIVE PERCENT: 17.7 % (ref 20–40)
MCH RBC QN AUTO: 28.3 PG (ref 27–31)
MCHC RBC AUTO-ENTMCNC: 31.4 G/DL (ref 33–37)
MCV RBC AUTO: 90.2 FL (ref 80–94)
MONOCYTES ABSOLUTE: 0.7 K/UL (ref 0–0.9)
MONOCYTES RELATIVE PERCENT: 9.9 % (ref 0–10)
NEUTROPHILS ABSOLUTE: 5 K/UL (ref 1.5–7.5)
NEUTROPHILS RELATIVE PERCENT: 70.9 % (ref 50–65)
PDW BLD-RTO: 14.1 % (ref 11.5–14.5)
PLATELET # BLD: 259 K/UL (ref 130–400)
PMV BLD AUTO: 10.9 FL (ref 9.4–12.4)
POTASSIUM SERPL-SCNC: 4.1 MMOL/L (ref 3.5–5)
PROTHROMBIN TIME: 14.5 SEC (ref 12–14.6)
RBC # BLD: 4.8 M/UL (ref 4.7–6.1)
SARS-COV-2, PCR: NOT DETECTED
SODIUM BLD-SCNC: 138 MMOL/L (ref 136–145)
WBC # BLD: 7.1 K/UL (ref 4.8–10.8)

## 2021-09-15 PROCEDURE — U0003 INFECTIOUS AGENT DETECTION BY NUCLEIC ACID (DNA OR RNA); SEVERE ACUTE RESPIRATORY SYNDROME CORONAVIRUS 2 (SARS-COV-2) (CORONAVIRUS DISEASE [COVID-19]), AMPLIFIED PROBE TECHNIQUE, MAKING USE OF HIGH THROUGHPUT TECHNOLOGIES AS DESCRIBED BY CMS-2020-01-R: HCPCS

## 2021-09-15 PROCEDURE — 85610 PROTHROMBIN TIME: CPT

## 2021-09-15 PROCEDURE — 93005 ELECTROCARDIOGRAM TRACING: CPT | Performed by: UROLOGY

## 2021-09-15 PROCEDURE — 85025 COMPLETE CBC W/AUTO DIFF WBC: CPT

## 2021-09-15 PROCEDURE — 80048 BASIC METABOLIC PNL TOTAL CA: CPT

## 2021-09-15 PROCEDURE — 85730 THROMBOPLASTIN TIME PARTIAL: CPT

## 2021-09-15 PROCEDURE — U0005 INFEC AGEN DETEC AMPLI PROBE: HCPCS

## 2021-09-15 RX ORDER — CIPROFLOXACIN 2 MG/ML
400 INJECTION, SOLUTION INTRAVENOUS ONCE
Status: CANCELLED | OUTPATIENT
Start: 2021-09-16

## 2021-09-16 ENCOUNTER — ANESTHESIA EVENT (OUTPATIENT)
Dept: OPERATING ROOM | Age: 86
DRG: 312 | End: 2021-09-16
Payer: MEDICARE

## 2021-09-16 ENCOUNTER — HOSPITAL ENCOUNTER (INPATIENT)
Age: 86
LOS: 4 days | Discharge: HOME HEALTH | DRG: 312 | End: 2021-09-21
Attending: UROLOGY | Admitting: UROLOGY
Payer: MEDICARE

## 2021-09-16 ENCOUNTER — ANESTHESIA (OUTPATIENT)
Dept: OPERATING ROOM | Age: 86
DRG: 312 | End: 2021-09-16
Payer: MEDICARE

## 2021-09-16 VITALS — SYSTOLIC BLOOD PRESSURE: 162 MMHG | DIASTOLIC BLOOD PRESSURE: 77 MMHG | TEMPERATURE: 95.7 F | OXYGEN SATURATION: 100 %

## 2021-09-16 LAB
GLUCOSE BLD-MCNC: 130 MG/DL (ref 70–99)
PERFORMED ON: ABNORMAL

## 2021-09-16 PROCEDURE — 6370000000 HC RX 637 (ALT 250 FOR IP): Performed by: UROLOGY

## 2021-09-16 PROCEDURE — 0T9B30Z DRAINAGE OF BLADDER WITH DRAINAGE DEVICE, PERCUTANEOUS APPROACH: ICD-10-PCS | Performed by: UROLOGY

## 2021-09-16 PROCEDURE — 0TPBX0Z REMOVAL OF DRAINAGE DEVICE FROM BLADDER, EXTERNAL APPROACH: ICD-10-PCS | Performed by: UROLOGY

## 2021-09-16 PROCEDURE — 7100000001 HC PACU RECOVERY - ADDTL 15 MIN: Performed by: UROLOGY

## 2021-09-16 PROCEDURE — 2500000003 HC RX 250 WO HCPCS: Performed by: NURSE ANESTHETIST, CERTIFIED REGISTERED

## 2021-09-16 PROCEDURE — 6360000002 HC RX W HCPCS: Performed by: UROLOGY

## 2021-09-16 PROCEDURE — 2720000010 HC SURG SUPPLY STERILE: Performed by: UROLOGY

## 2021-09-16 PROCEDURE — G0378 HOSPITAL OBSERVATION PER HR: HCPCS

## 2021-09-16 PROCEDURE — 6360000002 HC RX W HCPCS: Performed by: NURSE ANESTHETIST, CERTIFIED REGISTERED

## 2021-09-16 PROCEDURE — 2580000003 HC RX 258: Performed by: NURSE ANESTHETIST, CERTIFIED REGISTERED

## 2021-09-16 PROCEDURE — 0T7C8ZZ DILATION OF BLADDER NECK, VIA NATURAL OR ARTIFICIAL OPENING ENDOSCOPIC: ICD-10-PCS | Performed by: UROLOGY

## 2021-09-16 PROCEDURE — 3600000004 HC SURGERY LEVEL 4 BASE: Performed by: UROLOGY

## 2021-09-16 PROCEDURE — 3700000001 HC ADD 15 MINUTES (ANESTHESIA): Performed by: UROLOGY

## 2021-09-16 PROCEDURE — 3700000000 HC ANESTHESIA ATTENDED CARE: Performed by: UROLOGY

## 2021-09-16 PROCEDURE — 51705 CHANGE OF BLADDER TUBE: CPT | Performed by: UROLOGY

## 2021-09-16 PROCEDURE — 3600000014 HC SURGERY LEVEL 4 ADDTL 15MIN: Performed by: UROLOGY

## 2021-09-16 PROCEDURE — 52640 RELIEVE BLADDER CONTRACTURE: CPT | Performed by: UROLOGY

## 2021-09-16 PROCEDURE — C1769 GUIDE WIRE: HCPCS | Performed by: UROLOGY

## 2021-09-16 PROCEDURE — 82947 ASSAY GLUCOSE BLOOD QUANT: CPT

## 2021-09-16 PROCEDURE — 7100000000 HC PACU RECOVERY - FIRST 15 MIN: Performed by: UROLOGY

## 2021-09-16 PROCEDURE — 2709999900 HC NON-CHARGEABLE SUPPLY: Performed by: UROLOGY

## 2021-09-16 RX ORDER — SODIUM CHLORIDE, SODIUM LACTATE, POTASSIUM CHLORIDE, CALCIUM CHLORIDE 600; 310; 30; 20 MG/100ML; MG/100ML; MG/100ML; MG/100ML
INJECTION, SOLUTION INTRAVENOUS CONTINUOUS PRN
Status: DISCONTINUED | OUTPATIENT
Start: 2021-09-16 | End: 2021-09-16 | Stop reason: SDUPTHER

## 2021-09-16 RX ORDER — GABAPENTIN 400 MG/1
400 CAPSULE ORAL 3 TIMES DAILY
Status: DISCONTINUED | OUTPATIENT
Start: 2021-09-16 | End: 2021-09-21 | Stop reason: HOSPADM

## 2021-09-16 RX ORDER — SODIUM CHLORIDE 9 MG/ML
INJECTION, SOLUTION INTRAVENOUS CONTINUOUS
Status: DISCONTINUED | OUTPATIENT
Start: 2021-09-16 | End: 2021-09-17

## 2021-09-16 RX ORDER — IBUPROFEN 400 MG/1
600 TABLET ORAL 2 TIMES DAILY
Status: DISCONTINUED | OUTPATIENT
Start: 2021-09-16 | End: 2021-09-21 | Stop reason: HOSPADM

## 2021-09-16 RX ORDER — ENALAPRILAT 2.5 MG/2ML
1.25 INJECTION INTRAVENOUS
Status: DISCONTINUED | OUTPATIENT
Start: 2021-09-16 | End: 2021-09-16 | Stop reason: HOSPADM

## 2021-09-16 RX ORDER — HYDROCODONE BITARTRATE AND ACETAMINOPHEN 5; 325 MG/1; MG/1
1 TABLET ORAL EVERY 4 HOURS PRN
Status: DISCONTINUED | OUTPATIENT
Start: 2021-09-16 | End: 2021-09-21 | Stop reason: HOSPADM

## 2021-09-16 RX ORDER — CIPROFLOXACIN 2 MG/ML
400 INJECTION, SOLUTION INTRAVENOUS ONCE
Status: COMPLETED | OUTPATIENT
Start: 2021-09-16 | End: 2021-09-16

## 2021-09-16 RX ORDER — HYDROMORPHONE HYDROCHLORIDE 1 MG/ML
0.25 INJECTION, SOLUTION INTRAMUSCULAR; INTRAVENOUS; SUBCUTANEOUS EVERY 5 MIN PRN
Status: DISCONTINUED | OUTPATIENT
Start: 2021-09-16 | End: 2021-09-16 | Stop reason: HOSPADM

## 2021-09-16 RX ORDER — SODIUM CHLORIDE 9 MG/ML
25 INJECTION, SOLUTION INTRAVENOUS PRN
Status: DISCONTINUED | OUTPATIENT
Start: 2021-09-16 | End: 2021-09-21 | Stop reason: HOSPADM

## 2021-09-16 RX ORDER — FENTANYL CITRATE 50 UG/ML
50 INJECTION, SOLUTION INTRAMUSCULAR; INTRAVENOUS
Status: DISCONTINUED | OUTPATIENT
Start: 2021-09-16 | End: 2021-09-16 | Stop reason: HOSPADM

## 2021-09-16 RX ORDER — METOCLOPRAMIDE HYDROCHLORIDE 5 MG/ML
10 INJECTION INTRAMUSCULAR; INTRAVENOUS
Status: DISCONTINUED | OUTPATIENT
Start: 2021-09-16 | End: 2021-09-16 | Stop reason: HOSPADM

## 2021-09-16 RX ORDER — HYDROMORPHONE HYDROCHLORIDE 1 MG/ML
0.5 INJECTION, SOLUTION INTRAMUSCULAR; INTRAVENOUS; SUBCUTANEOUS EVERY 5 MIN PRN
Status: DISCONTINUED | OUTPATIENT
Start: 2021-09-16 | End: 2021-09-16 | Stop reason: HOSPADM

## 2021-09-16 RX ORDER — LANOLIN ALCOHOL/MO/W.PET/CERES
3 CREAM (GRAM) TOPICAL NIGHTLY PRN
Status: DISCONTINUED | OUTPATIENT
Start: 2021-09-16 | End: 2021-09-21 | Stop reason: HOSPADM

## 2021-09-16 RX ORDER — MIRTAZAPINE 15 MG/1
30 TABLET, FILM COATED ORAL NIGHTLY
Status: DISCONTINUED | OUTPATIENT
Start: 2021-09-16 | End: 2021-09-21 | Stop reason: HOSPADM

## 2021-09-16 RX ORDER — LABETALOL HYDROCHLORIDE 5 MG/ML
5 INJECTION, SOLUTION INTRAVENOUS EVERY 10 MIN PRN
Status: DISCONTINUED | OUTPATIENT
Start: 2021-09-16 | End: 2021-09-16 | Stop reason: HOSPADM

## 2021-09-16 RX ORDER — LIDOCAINE HYDROCHLORIDE 10 MG/ML
1 INJECTION, SOLUTION EPIDURAL; INFILTRATION; INTRACAUDAL; PERINEURAL
Status: DISCONTINUED | OUTPATIENT
Start: 2021-09-16 | End: 2021-09-16 | Stop reason: HOSPADM

## 2021-09-16 RX ORDER — MORPHINE SULFATE 2 MG/ML
2 INJECTION, SOLUTION INTRAMUSCULAR; INTRAVENOUS
Status: DISCONTINUED | OUTPATIENT
Start: 2021-09-16 | End: 2021-09-21 | Stop reason: HOSPADM

## 2021-09-16 RX ORDER — MORPHINE SULFATE 4 MG/ML
4 INJECTION, SOLUTION INTRAMUSCULAR; INTRAVENOUS
Status: DISCONTINUED | OUTPATIENT
Start: 2021-09-16 | End: 2021-09-21 | Stop reason: HOSPADM

## 2021-09-16 RX ORDER — ATORVASTATIN CALCIUM 20 MG/1
20 TABLET, FILM COATED ORAL DAILY
Status: DISCONTINUED | OUTPATIENT
Start: 2021-09-16 | End: 2021-09-21 | Stop reason: HOSPADM

## 2021-09-16 RX ORDER — ONDANSETRON 2 MG/ML
INJECTION INTRAMUSCULAR; INTRAVENOUS PRN
Status: DISCONTINUED | OUTPATIENT
Start: 2021-09-16 | End: 2021-09-16 | Stop reason: SDUPTHER

## 2021-09-16 RX ORDER — FENTANYL CITRATE 50 UG/ML
INJECTION, SOLUTION INTRAMUSCULAR; INTRAVENOUS PRN
Status: DISCONTINUED | OUTPATIENT
Start: 2021-09-16 | End: 2021-09-16 | Stop reason: SDUPTHER

## 2021-09-16 RX ORDER — VERAPAMIL HYDROCHLORIDE 120 MG/1
120 TABLET, FILM COATED ORAL 2 TIMES DAILY
Status: DISCONTINUED | OUTPATIENT
Start: 2021-09-16 | End: 2021-09-21 | Stop reason: HOSPADM

## 2021-09-16 RX ORDER — MORPHINE SULFATE 4 MG/ML
4 INJECTION, SOLUTION INTRAMUSCULAR; INTRAVENOUS EVERY 5 MIN PRN
Status: DISCONTINUED | OUTPATIENT
Start: 2021-09-16 | End: 2021-09-16 | Stop reason: HOSPADM

## 2021-09-16 RX ORDER — LIDOCAINE HYDROCHLORIDE 10 MG/ML
INJECTION, SOLUTION INFILTRATION; PERINEURAL PRN
Status: DISCONTINUED | OUTPATIENT
Start: 2021-09-16 | End: 2021-09-16 | Stop reason: SDUPTHER

## 2021-09-16 RX ORDER — PROPOFOL 10 MG/ML
INJECTION, EMULSION INTRAVENOUS PRN
Status: DISCONTINUED | OUTPATIENT
Start: 2021-09-16 | End: 2021-09-16 | Stop reason: SDUPTHER

## 2021-09-16 RX ORDER — PROMETHAZINE HYDROCHLORIDE 25 MG/ML
6.25 INJECTION, SOLUTION INTRAMUSCULAR; INTRAVENOUS
Status: DISCONTINUED | OUTPATIENT
Start: 2021-09-16 | End: 2021-09-16 | Stop reason: HOSPADM

## 2021-09-16 RX ORDER — SODIUM CHLORIDE 0.9 % (FLUSH) 0.9 %
5-40 SYRINGE (ML) INJECTION EVERY 12 HOURS SCHEDULED
Status: DISCONTINUED | OUTPATIENT
Start: 2021-09-16 | End: 2021-09-16 | Stop reason: HOSPADM

## 2021-09-16 RX ORDER — DONEPEZIL HYDROCHLORIDE 5 MG/1
10 TABLET, FILM COATED ORAL NIGHTLY
Status: DISCONTINUED | OUTPATIENT
Start: 2021-09-16 | End: 2021-09-21 | Stop reason: HOSPADM

## 2021-09-16 RX ORDER — MIDAZOLAM HYDROCHLORIDE 1 MG/ML
2 INJECTION INTRAMUSCULAR; INTRAVENOUS
Status: DISCONTINUED | OUTPATIENT
Start: 2021-09-16 | End: 2021-09-16 | Stop reason: HOSPADM

## 2021-09-16 RX ORDER — ACETAMINOPHEN 500 MG
1000 TABLET ORAL 4 TIMES DAILY PRN
Status: DISCONTINUED | OUTPATIENT
Start: 2021-09-16 | End: 2021-09-21 | Stop reason: HOSPADM

## 2021-09-16 RX ORDER — SODIUM CHLORIDE 0.9 % (FLUSH) 0.9 %
5-40 SYRINGE (ML) INJECTION PRN
Status: DISCONTINUED | OUTPATIENT
Start: 2021-09-16 | End: 2021-09-16 | Stop reason: HOSPADM

## 2021-09-16 RX ORDER — EPHEDRINE SULFATE 50 MG/ML
INJECTION, SOLUTION INTRAVENOUS PRN
Status: DISCONTINUED | OUTPATIENT
Start: 2021-09-16 | End: 2021-09-16 | Stop reason: SDUPTHER

## 2021-09-16 RX ORDER — HYDROCODONE BITARTRATE AND ACETAMINOPHEN 5; 325 MG/1; MG/1
2 TABLET ORAL EVERY 4 HOURS PRN
Status: DISCONTINUED | OUTPATIENT
Start: 2021-09-16 | End: 2021-09-21 | Stop reason: HOSPADM

## 2021-09-16 RX ORDER — CIPROFLOXACIN 2 MG/ML
400 INJECTION, SOLUTION INTRAVENOUS EVERY 12 HOURS
Status: DISCONTINUED | OUTPATIENT
Start: 2021-09-17 | End: 2021-09-20

## 2021-09-16 RX ORDER — MORPHINE SULFATE 2 MG/ML
2 INJECTION, SOLUTION INTRAMUSCULAR; INTRAVENOUS EVERY 5 MIN PRN
Status: DISCONTINUED | OUTPATIENT
Start: 2021-09-16 | End: 2021-09-16 | Stop reason: HOSPADM

## 2021-09-16 RX ORDER — POLYETHYLENE GLYCOL 3350 17 G/17G
17 POWDER, FOR SOLUTION ORAL DAILY PRN
Status: DISCONTINUED | OUTPATIENT
Start: 2021-09-16 | End: 2021-09-21 | Stop reason: HOSPADM

## 2021-09-16 RX ORDER — HYDRALAZINE HYDROCHLORIDE 20 MG/ML
5 INJECTION INTRAMUSCULAR; INTRAVENOUS EVERY 10 MIN PRN
Status: DISCONTINUED | OUTPATIENT
Start: 2021-09-16 | End: 2021-09-16 | Stop reason: HOSPADM

## 2021-09-16 RX ORDER — ROCURONIUM BROMIDE 10 MG/ML
INJECTION, SOLUTION INTRAVENOUS PRN
Status: DISCONTINUED | OUTPATIENT
Start: 2021-09-16 | End: 2021-09-16 | Stop reason: SDUPTHER

## 2021-09-16 RX ORDER — SODIUM CHLORIDE, SODIUM LACTATE, POTASSIUM CHLORIDE, CALCIUM CHLORIDE 600; 310; 30; 20 MG/100ML; MG/100ML; MG/100ML; MG/100ML
INJECTION, SOLUTION INTRAVENOUS CONTINUOUS
Status: DISCONTINUED | OUTPATIENT
Start: 2021-09-16 | End: 2021-09-16

## 2021-09-16 RX ORDER — ONDANSETRON 2 MG/ML
4 INJECTION INTRAMUSCULAR; INTRAVENOUS EVERY 6 HOURS PRN
Status: DISCONTINUED | OUTPATIENT
Start: 2021-09-16 | End: 2021-09-21 | Stop reason: HOSPADM

## 2021-09-16 RX ORDER — PANTOPRAZOLE SODIUM 40 MG/1
40 TABLET, DELAYED RELEASE ORAL
Status: DISCONTINUED | OUTPATIENT
Start: 2021-09-17 | End: 2021-09-21 | Stop reason: HOSPADM

## 2021-09-16 RX ORDER — SCOLOPAMINE TRANSDERMAL SYSTEM 1 MG/1
1 PATCH, EXTENDED RELEASE TRANSDERMAL ONCE
Status: DISCONTINUED | OUTPATIENT
Start: 2021-09-16 | End: 2021-09-16 | Stop reason: HOSPADM

## 2021-09-16 RX ORDER — MORPHINE SULFATE 4 MG/ML
4 INJECTION, SOLUTION INTRAMUSCULAR; INTRAVENOUS
Status: DISCONTINUED | OUTPATIENT
Start: 2021-09-16 | End: 2021-09-16 | Stop reason: HOSPADM

## 2021-09-16 RX ORDER — SODIUM CHLORIDE 0.9 % (FLUSH) 0.9 %
5-40 SYRINGE (ML) INJECTION EVERY 12 HOURS SCHEDULED
Status: DISCONTINUED | OUTPATIENT
Start: 2021-09-16 | End: 2021-09-21 | Stop reason: HOSPADM

## 2021-09-16 RX ORDER — SODIUM CHLORIDE 9 MG/ML
25 INJECTION, SOLUTION INTRAVENOUS PRN
Status: DISCONTINUED | OUTPATIENT
Start: 2021-09-16 | End: 2021-09-16 | Stop reason: HOSPADM

## 2021-09-16 RX ORDER — ONDANSETRON 4 MG/1
4 TABLET, ORALLY DISINTEGRATING ORAL EVERY 8 HOURS PRN
Status: DISCONTINUED | OUTPATIENT
Start: 2021-09-16 | End: 2021-09-21 | Stop reason: HOSPADM

## 2021-09-16 RX ORDER — MEPERIDINE HYDROCHLORIDE 25 MG/ML
12.5 INJECTION INTRAMUSCULAR; INTRAVENOUS; SUBCUTANEOUS EVERY 5 MIN PRN
Status: DISCONTINUED | OUTPATIENT
Start: 2021-09-16 | End: 2021-09-16 | Stop reason: HOSPADM

## 2021-09-16 RX ORDER — GUAIFENESIN 200 MG/1
400 TABLET ORAL 4 TIMES DAILY PRN
Status: DISCONTINUED | OUTPATIENT
Start: 2021-09-16 | End: 2021-09-21 | Stop reason: HOSPADM

## 2021-09-16 RX ORDER — DIPHENHYDRAMINE HYDROCHLORIDE 50 MG/ML
12.5 INJECTION INTRAMUSCULAR; INTRAVENOUS
Status: DISCONTINUED | OUTPATIENT
Start: 2021-09-16 | End: 2021-09-16 | Stop reason: HOSPADM

## 2021-09-16 RX ORDER — GLYCOPYRROLATE 0.2 MG/ML
INJECTION INTRAMUSCULAR; INTRAVENOUS PRN
Status: DISCONTINUED | OUTPATIENT
Start: 2021-09-16 | End: 2021-09-16 | Stop reason: SDUPTHER

## 2021-09-16 RX ORDER — SODIUM CHLORIDE 0.9 % (FLUSH) 0.9 %
5-40 SYRINGE (ML) INJECTION PRN
Status: DISCONTINUED | OUTPATIENT
Start: 2021-09-16 | End: 2021-09-21 | Stop reason: HOSPADM

## 2021-09-16 RX ADMIN — FENTANYL CITRATE 50 MCG: 50 INJECTION, SOLUTION INTRAMUSCULAR; INTRAVENOUS at 15:35

## 2021-09-16 RX ADMIN — SUGAMMADEX 200 MG: 100 INJECTION, SOLUTION INTRAVENOUS at 15:54

## 2021-09-16 RX ADMIN — ONDANSETRON HYDROCHLORIDE 4 MG: 2 SOLUTION INTRAMUSCULAR; INTRAVENOUS at 20:15

## 2021-09-16 RX ADMIN — SODIUM CHLORIDE, SODIUM LACTATE, POTASSIUM CHLORIDE, AND CALCIUM CHLORIDE: 600; 310; 30; 20 INJECTION, SOLUTION INTRAVENOUS at 14:49

## 2021-09-16 RX ADMIN — ATORVASTATIN CALCIUM 20 MG: 20 TABLET, FILM COATED ORAL at 22:12

## 2021-09-16 RX ADMIN — VERAPAMIL HYDROCHLORIDE 120 MG: 120 TABLET, FILM COATED ORAL at 22:42

## 2021-09-16 RX ADMIN — FENTANYL CITRATE 50 MCG: 50 INJECTION, SOLUTION INTRAMUSCULAR; INTRAVENOUS at 14:50

## 2021-09-16 RX ADMIN — CIPROFLOXACIN 400 MG: 2 INJECTION, SOLUTION INTRAVENOUS at 15:03

## 2021-09-16 RX ADMIN — HYDROMORPHONE HYDROCHLORIDE 0.5 MG: 1 INJECTION, SOLUTION INTRAMUSCULAR; INTRAVENOUS; SUBCUTANEOUS at 16:24

## 2021-09-16 RX ADMIN — ROCURONIUM BROMIDE 50 MG: 10 INJECTION, SOLUTION INTRAVENOUS at 14:54

## 2021-09-16 RX ADMIN — PHENYLEPHRINE HYDROCHLORIDE 200 MCG: 10 INJECTION INTRAVENOUS at 15:03

## 2021-09-16 RX ADMIN — PROPOFOL 100 MG: 10 INJECTION, EMULSION INTRAVENOUS at 14:53

## 2021-09-16 RX ADMIN — GABAPENTIN 400 MG: 400 CAPSULE ORAL at 22:12

## 2021-09-16 RX ADMIN — MIRTAZAPINE 30 MG: 15 TABLET, FILM COATED ORAL at 22:13

## 2021-09-16 RX ADMIN — IBUPROFEN 600 MG: 400 TABLET ORAL at 22:12

## 2021-09-16 RX ADMIN — DONEPEZIL HYDROCHLORIDE 10 MG: 5 TABLET, FILM COATED ORAL at 22:12

## 2021-09-16 RX ADMIN — EPHEDRINE SULFATE 10 MG: 50 INJECTION INTRAMUSCULAR; INTRAVENOUS; SUBCUTANEOUS at 15:29

## 2021-09-16 RX ADMIN — LIDOCAINE HYDROCHLORIDE 50 MG: 10 INJECTION, SOLUTION INFILTRATION; PERINEURAL at 14:53

## 2021-09-16 RX ADMIN — SODIUM CHLORIDE, SODIUM LACTATE, POTASSIUM CHLORIDE, AND CALCIUM CHLORIDE: 600; 310; 30; 20 INJECTION, SOLUTION INTRAVENOUS at 15:52

## 2021-09-16 RX ADMIN — GLYCOPYRROLATE 0.2 MG: 0.2 INJECTION, SOLUTION INTRAMUSCULAR; INTRAVENOUS at 15:58

## 2021-09-16 RX ADMIN — GLYCOPYRROLATE 0.2 MG: 0.2 INJECTION, SOLUTION INTRAMUSCULAR; INTRAVENOUS at 15:08

## 2021-09-16 RX ADMIN — ONDANSETRON HYDROCHLORIDE 4 MG: 2 INJECTION, SOLUTION INTRAMUSCULAR; INTRAVENOUS at 15:45

## 2021-09-16 ASSESSMENT — PAIN - FUNCTIONAL ASSESSMENT: PAIN_FUNCTIONAL_ASSESSMENT: PREVENTS OR INTERFERES SOME ACTIVE ACTIVITIES AND ADLS

## 2021-09-16 ASSESSMENT — PAIN DESCRIPTION - PAIN TYPE
TYPE: SURGICAL PAIN
TYPE: ACUTE PAIN;SURGICAL PAIN

## 2021-09-16 ASSESSMENT — PAIN DESCRIPTION - LOCATION: LOCATION: ABDOMEN

## 2021-09-16 ASSESSMENT — PAIN DESCRIPTION - ONSET: ONSET: ON-GOING

## 2021-09-16 ASSESSMENT — PAIN SCALES - GENERAL
PAINLEVEL_OUTOF10: 8
PAINLEVEL_OUTOF10: 6

## 2021-09-16 ASSESSMENT — PAIN DESCRIPTION - DESCRIPTORS
DESCRIPTORS: BURNING
DESCRIPTORS: ACHING;DISCOMFORT

## 2021-09-16 ASSESSMENT — ENCOUNTER SYMPTOMS: SHORTNESS OF BREATH: 0

## 2021-09-16 ASSESSMENT — PAIN SCALES - WONG BAKER: WONGBAKER_NUMERICALRESPONSE: 8

## 2021-09-16 ASSESSMENT — LIFESTYLE VARIABLES: SMOKING_STATUS: 0

## 2021-09-16 ASSESSMENT — PAIN DESCRIPTION - PROGRESSION: CLINICAL_PROGRESSION: NOT CHANGED

## 2021-09-16 ASSESSMENT — PAIN DESCRIPTION - FREQUENCY: FREQUENCY: INTERMITTENT

## 2021-09-16 ASSESSMENT — PAIN DESCRIPTION - ORIENTATION: ORIENTATION: LOWER

## 2021-09-16 NOTE — LETTER
Brynn Dacosta,  at [facility]  [phone] phone  [phone] fax  [phone] CELL (FREDY [address])        Brynn Dacosta  at [place] [phone] phone  [phone] fax  [phone] CELL ([address])

## 2021-09-16 NOTE — OP NOTE
Brief operative Note      Patient: Aroldo Paulson  YOB: 1934  MRN: [id number]    Date of Procedure: 9/16/2021    Pre-Op Diagnosis: bladder neck contracture    Post-Op Diagnosis: Same       Procedure(s):  CYSTOSCOPY TRANSURETHRAL inison BLADDER Bladder neck contracture ; exchange supra pubic catheter    Surgeon(s):  Trisha Field MD    Assistant:   * No surgical staff found *    Anesthesia: General    Estimated Blood Loss (mL): 10    Complications: None    Specimens:   * No specimens in log *    Implants:  * No implants in log *      Drains:   Urethral Catheter Triple-lumen 20 fr (Active)       Urethral Catheter Double-lumen 16 fr (Active)   Catheter Indications Urinary retention (acute or chronic), continuous bladder irrigation or bladder outlet obstruction 08/24/21 1815   Site Assessment Other (Comment) 08/24/21 1745   Urine Color Clarissa 08/24/21 1815   Urine Appearance Other (Comment) 08/24/21 1745       Urethral Catheter Double-lumen 16 fr (Active)       Urethral Catheter Double-lumen 22 fr (Active)       Findings: Transurethral visualization reveals completely occluded bladder neck. Antegrade flexible cystoscopy through suprapubic tube tract shows small pinpoint bladder neck was able to pass a guidewire through this. Guidewire extracted out the penis and now through and through. TUIBNC was then performed. Bladder neck wide open post incision. 16 [place] suprapubic catheter changed. 25 Western Lanie two-way urethral catheter placed. Patient on CBI with inflow through the suprapubic tube and drainage urethral catheter    Detailed Description of Procedure:   See dictated report: [id number]    Disposition to PACU then admit for observation overnight for pain control and for CBI as he does have some hematuria. Wean this off overnight. Ultimately home tomorrow with suprapubic tube plugged and urethral catheter to gravity drainage. Would leave urethral catheter x7 days.   Will leave suprapubic tube in place until assured patient is voiding and no recurrent bladder neck contracture.      Electronically signed by Han Maxwell MD on 9/16/2021 at 4:24 PM

## 2021-09-16 NOTE — ANESTHESIA PRE PROCEDURE
Department of Anesthesiology  Preprocedure Note       Name:  Lora Aguilar   Age:  80 y.o.  :  1934                                          MRN:  [id number]         Date:  2021      Surgeon: Kerwin Vaca):  Vilma Yao MD    Procedure: Procedure(s):  CYSTOSCOPY TRANSURETHRAL RESECTION BLADDER Bladder neck contracture SP tube tract    Medications prior to admission:   Prior to Admission medications    Medication Sig Start Date End Date Taking? Authorizing Provider   indomethacin (INDOCIN) 25 MG capsule Take 25 mg by mouth 3 times daily as needed  21  Yes Historical Provider, MD   diclofenac sodium (VOLTAREN) 1 % GEL APPLY TWO TO FOUR GRAMS TO AFFECTED AREA TWICE DAILY AS NEEDED FOR PAIN 21  Yes Historical Provider, MD   acetaminophen (TYLENOL) 500 MG tablet Take 2 tablets by mouth 4 times daily as needed for Pain 21  Yes Vilma Yao MD   ibuprofen (ADVIL;MOTRIN) 600 MG tablet Take 1 tablet by mouth 2 times daily HOLD THIS MEDICATION FOR AT LEAST 5 DAYS 4/15/21  Yes Marisa Viramontes, APRN - CNP   omeprazole (PRILOSEC) 20 MG delayed release capsule Take 20 mg by mouth daily   Yes Historical Provider, MD   gabapentin (NEURONTIN) 400 MG capsule Take 400 mg by mouth 3 times daily. Yes Historical Provider, MD   guaiFENesin 400 MG tablet Take 400 mg by mouth 4 times daily as needed for Cough   Yes Historical Provider, MD   mirtazapine (REMERON) 30 MG tablet Take 30 mg by mouth nightly   Yes Historical Provider, MD   atorvastatin (LIPITOR) 20 MG tablet Take 20 mg by mouth daily Take 1/2 tab at bedtime.    Yes Historical Provider, MD   verapamil (CALAN) 120 MG tablet Take 120 mg by mouth 2 times daily   Yes Historical Provider, MD   donepezil (ARICEPT) 10 MG tablet Take 10 mg by mouth nightly   Yes Historical Provider, MD   melatonin 3 MG TABS tablet Take 3 mg by mouth nightly as needed Take 2 hs prn   Yes Historical Provider, MD   metFORMIN (GLUCOPHAGE) 1000 MG tablet Take 1,000 mg by mouth 2 times daily (with meals)   Yes Historical Provider, MD       Current medications:    Current Facility-Administered Medications   Medication Dose Route Frequency Provider Last Rate Last Admin    ciprofloxacin (CIPRO) IVPB 400 mg  400 mg IntraVENous Once Yimi Piper MD           Allergies:     Allergies   Allergen Reactions    Pcn [Penicillins] Hives     \"Huge dose of PCN\"       Problem List:    Patient Active Problem List   Diagnosis Code    Benign prostatic hyperplasia with urinary retention N40.1, R33.8    Bladder neck contracture N32.0    Urinary retention R33.9       Past Medical History:        Diagnosis Date    Arthritis     Bladder neck contracture     Diabetes mellitus ([facility] [address]     History of blood transfusion     Hyperlipidemia     Hypertension     Immunization due     Neuropathy        Past Surgical History:        Procedure Laterality Date    COLONOSCOPY      CYSTOSCOPY N/A 2021    SUPRAPUBIC TUBE PLACEMENT performed by Yimi Piper MD at [address] [place] [address], [facility]      EYE SURGERY Bilateral     cataract    NOSE SURGERY      SKIN BIOPSY      TONSILLECTOMY      TURP N/A 2021    TRANSURETHRAL RESECTION PROSTATE performed by Yimi Piper MD at [place] [address] History:    Social History     Tobacco Use    Smoking status: Former Smoker     Types: Cigars     Quit date:      Years since quittin.    Smokeless tobacco: Never Used   Substance Use Topics    Alcohol use: Not Currently                                Counseling given: Not Answered      Vital Signs (Current):   Vitals:    21 1247   BP: 115/67   Pulse: 72   Resp: 14   Temp: 98.2 °F (36.8 °C)   TempSrc: Tympanic   SpO2: 99%   Weight: 140 lb (63.5 kg)   Height: 5' 7\" (1.702 m)                                              BP Readings from Last 3 Encounters:   21 115/67   21 126/86   21 (!) 91/52       NPO Status: Time of last liquid consumption: 0400 Time of last solid consumption: 2100                        Date of last liquid consumption: 09/16/21                        Date of last solid food consumption: 09/15/21    BMI:   Wt Readings from Last 3 Encounters:   09/16/21 140 lb (63.5 kg)   09/15/21 140 lb (63.5 kg)   08/24/21 145 lb (65.8 kg)     Body mass index is 21.93 kg/m². CBC:   Lab Results   Component Value Date    WBC 7.1 09/15/2021    RBC 4.80 09/15/2021    HGB 13.6 09/15/2021    HCT 43.3 09/15/2021    MCV 90.2 09/15/2021    RDW 14.1 09/15/2021     09/15/2021       CMP:   Lab Results   Component Value Date     09/15/2021    K 4.1 09/15/2021    K 4.0 04/15/2021     09/15/2021    CO2 27 09/15/2021    BUN 20 09/15/2021    CREATININE 0.6 09/15/2021    GFRAA >59 09/15/2021    LABGLOM >60 09/15/2021    GLUCOSE 111 09/15/2021    CALCIUM 9.4 09/15/2021       POC Tests:   Recent Labs     09/16/21  1244   POCGLU 130*       Coags:   Lab Results   Component Value Date    PROTIME 14.5 09/15/2021    INR 1.11 09/15/2021    APTT 25.4 09/15/2021       HCG (If Applicable): No results found for: PREGTESTUR, PREGSERUM, HCG, HCGQUANT     ABGs: No results found for: PHART, PO2ART, KLE7SWZ, ZHE8IIT, BEART, J0TMJMGA     Type & Screen (If Applicable):  No results found for: LABABO, LABRH    Drug/Infectious Status (If Applicable):  No results found for: HIV, HEPCAB    COVID-19 Screening (If Applicable):   Lab Results   Component Value Date    COVID19 Not Detected 09/15/2021           Anesthesia Evaluation  Patient summary reviewed and Nursing notes reviewed no history of anesthetic complications:   Airway: Mallampati: II  TM distance: >3 FB   Neck ROM: full  Mouth opening: > = 3 FB Dental: normal exam         Pulmonary:Negative Pulmonary ROS and normal exam  breath sounds clear to auscultation      (-) shortness of breath and not a current smoker          Patient did not smoke on day of surgery.                  Cardiovascular:    (+) hypertension:,     (-) CAD,  angina and  CHF    NYHA Classification: I  ECG reviewed  Rhythm: regular  Rate: normal           Beta Blocker:  Not on Beta Blocker         Neuro/Psych:   Negative Neuro/Psych ROS     (-) seizures, CVA and depression/anxiety            GI/Hepatic/Renal: Neg GI/Hepatic/Renal ROS  (+) renal disease:,      (-) hiatal hernia and GERD       Endo/Other: Negative Endo/Other ROS   (+) Diabetes, . Pt had PAT visit. Abdominal:       Abdomen: soft. Vascular: Other Findings:             Anesthesia Plan      general     ASA 2     (Iv zofran within 30 min of closing )  Induction: intravenous. BIS  MIPS: Postoperative opioids intended and Prophylactic antiemetics administered. Anesthetic plan and risks discussed with patient. Use of blood products discussed with patient whom. Plan discussed with CRNA.     Attending anesthesiologist reviewed and agrees with Pre Eval content              China Nielsen MD   9/16/2021

## 2021-09-16 NOTE — ANESTHESIA POSTPROCEDURE EVALUATION
Department of Anesthesiology  Postprocedure Note    Patient: Sunil Schneider  MRN: [id number]  YOB: 1934  Date of evaluation: 9/16/2021  Time:  4:22 PM     Procedure Summary     Date: 09/16/21 Room / Location: [facility]    Anesthesia Start: 1449 Anesthesia Stop: 1622    Procedure: CYSTOSCOPY TRANSURETHRAL inison BLADDER Bladder neck contracture ; exchange supra pubic catheter (N/A ) Diagnosis: (bladder neck contracture)    Surgeons: Yoan Burns MD Responsible Provider: BRYAN Batista CRNA    Anesthesia Type: general ASA Status: 2          Anesthesia Type: general    Sheri Phase I:      Sheri Phase II:      Last vitals: Reviewed and per EMR flowsheets.        Anesthesia Post Evaluation    Patient location during evaluation: PACU  Patient participation: complete - patient participated  Level of consciousness: sleepy but conscious  Pain score: 4  Airway patency: patent  Nausea & Vomiting: no nausea and no vomiting  Complications: no  Cardiovascular status: hemodynamically stable  Respiratory status: acceptable, spontaneous ventilation and room air  Hydration status: euvolemic

## 2021-09-16 NOTE — INTERVAL H&P NOTE
Update History & Physical    The patient's History and Physical of September 9, 2021 was reviewed with the patient and I examined the patient. There was no change. The surgical site was confirmed by the patient and me. Plan: The risks, benefits, expected outcome, and alternative to the recommended procedure have been discussed with the patient. Patient understands and wants to proceed with the procedure.      Electronically signed by Erling Phoenix, MD on 9/16/2021 at 2:27 PM

## 2021-09-17 PROBLEM — I95.9 HYPOTENSION: Status: ACTIVE | Noted: 2021-09-17

## 2021-09-17 LAB
ANION GAP SERPL CALCULATED.3IONS-SCNC: 8 MMOL/L (ref 7–19)
ATYPICAL LYMPHOCYTE RELATIVE PERCENT: 1 % (ref 0–8)
BACTERIA: ABNORMAL /HPF
BANDED NEUTROPHILS RELATIVE PERCENT: 13 % (ref 0–5)
BASOPHILS ABSOLUTE: 0 K/UL (ref 0–0.2)
BASOPHILS ABSOLUTE: 0.2 K/UL (ref 0–0.2)
BASOPHILS RELATIVE PERCENT: 0.1 % (ref 0–1)
BASOPHILS RELATIVE PERCENT: 1 % (ref 0–1)
BILIRUBIN URINE: NEGATIVE
BLOOD, URINE: ABNORMAL
BUN BLDV-MCNC: 16 MG/DL (ref 8–23)
CALCIUM SERPL-MCNC: 8.5 MG/DL (ref 8.8–10.2)
CHLORIDE BLD-SCNC: 99 MMOL/L (ref 98–111)
CLARITY: CLEAR
CO2: 28 MMOL/L (ref 22–29)
COLOR: YELLOW
CREAT SERPL-MCNC: 1.1 MG/DL (ref 0.5–1.2)
EOSINOPHILS ABSOLUTE: 0 K/UL (ref 0–0.6)
EOSINOPHILS ABSOLUTE: 0.1 K/UL (ref 0–0.6)
EOSINOPHILS RELATIVE PERCENT: 0 % (ref 0–5)
EOSINOPHILS RELATIVE PERCENT: 0.3 % (ref 0–5)
EPITHELIAL CELLS, UA: ABNORMAL /HPF
GFR AFRICAN AMERICAN: >59
GFR NON-AFRICAN AMERICAN: >60
GLUCOSE BLD-MCNC: 134 MG/DL (ref 70–99)
GLUCOSE BLD-MCNC: 142 MG/DL (ref 74–109)
GLUCOSE URINE: NEGATIVE MG/DL
HCT VFR BLD CALC: 33.3 % (ref 42–52)
HCT VFR BLD CALC: 35.1 % (ref 42–52)
HEMOGLOBIN: 10.4 G/DL (ref 14–18)
HEMOGLOBIN: 11.1 G/DL (ref 14–18)
HYPOCHROMIA: ABNORMAL
IMMATURE GRANULOCYTES #: 0.2 K/UL
IMMATURE GRANULOCYTES #: 0.4 K/UL
KETONES, URINE: NEGATIVE MG/DL
LACTIC ACID, SEPSIS: 2.8 MG/DL (ref 0.5–1.9)
LACTIC ACID, SEPSIS: 3 MG/DL (ref 0.5–1.9)
LACTIC ACID: 2.7 MMOL/L (ref 0.5–1.9)
LEUKOCYTE ESTERASE, URINE: ABNORMAL
LYMPHOCYTES ABSOLUTE: 1 K/UL (ref 1.1–4.5)
LYMPHOCYTES ABSOLUTE: 1.4 K/UL (ref 1.1–4.5)
LYMPHOCYTES RELATIVE PERCENT: 3 % (ref 20–40)
LYMPHOCYTES RELATIVE PERCENT: 7.7 % (ref 20–40)
MCH RBC QN AUTO: 28.4 PG (ref 27–31)
MCH RBC QN AUTO: 28.7 PG (ref 27–31)
MCHC RBC AUTO-ENTMCNC: 31.2 G/DL (ref 33–37)
MCHC RBC AUTO-ENTMCNC: 31.6 G/DL (ref 33–37)
MCV RBC AUTO: 89.8 FL (ref 80–94)
MCV RBC AUTO: 92 FL (ref 80–94)
MONOCYTES ABSOLUTE: 0.6 K/UL (ref 0–0.9)
MONOCYTES ABSOLUTE: 1 K/UL (ref 0–0.9)
MONOCYTES RELATIVE PERCENT: 3.5 % (ref 0–10)
MONOCYTES RELATIVE PERCENT: 4 % (ref 0–10)
MYELOCYTE PERCENT: 1 %
NEUTROPHILS ABSOLUTE: 15.7 K/UL (ref 1.5–7.5)
NEUTROPHILS ABSOLUTE: 21.7 K/UL (ref 1.5–7.5)
NEUTROPHILS RELATIVE PERCENT: 77 % (ref 50–65)
NEUTROPHILS RELATIVE PERCENT: 87.6 % (ref 50–65)
NITRITE, URINE: NEGATIVE
OVALOCYTES: ABNORMAL
PDW BLD-RTO: 13.9 % (ref 11.5–14.5)
PDW BLD-RTO: 14.1 % (ref 11.5–14.5)
PERFORMED ON: ABNORMAL
PH UA: 6 (ref 5–8)
PLATELET # BLD: 145 K/UL (ref 130–400)
PLATELET # BLD: 191 K/UL (ref 130–400)
PLATELET SLIDE REVIEW: ADEQUATE
PMV BLD AUTO: 10.2 FL (ref 9.4–12.4)
PMV BLD AUTO: 10.7 FL (ref 9.4–12.4)
POLYCHROMASIA: ABNORMAL
POTASSIUM REFLEX MAGNESIUM: 3.8 MMOL/L (ref 3.5–5)
PROTEIN UA: ABNORMAL MG/DL
RBC # BLD: 3.62 M/UL (ref 4.7–6.1)
RBC # BLD: 3.91 M/UL (ref 4.7–6.1)
RBC UA: ABNORMAL /HPF (ref 0–2)
SODIUM BLD-SCNC: 135 MMOL/L (ref 136–145)
SPECIFIC GRAVITY UA: 1 (ref 1–1.03)
TROPONIN: <0.01 NG/ML (ref 0–0.03)
UROBILINOGEN, URINE: 0.2 E.U./DL
WBC # BLD: 18 K/UL (ref 4.8–10.8)
WBC # BLD: 23.8 K/UL (ref 4.8–10.8)
WBC UA: ABNORMAL /HPF (ref 0–5)

## 2021-09-17 PROCEDURE — 2500000003 HC RX 250 WO HCPCS: Performed by: INTERNAL MEDICINE

## 2021-09-17 PROCEDURE — 2500000003 HC RX 250 WO HCPCS: Performed by: UROLOGY

## 2021-09-17 PROCEDURE — 36415 COLL VENOUS BLD VENIPUNCTURE: CPT

## 2021-09-17 PROCEDURE — 84484 ASSAY OF TROPONIN QUANT: CPT

## 2021-09-17 PROCEDURE — 83605 ASSAY OF LACTIC ACID: CPT

## 2021-09-17 PROCEDURE — 2000000000 HC ICU R&B

## 2021-09-17 PROCEDURE — 99024 POSTOP FOLLOW-UP VISIT: CPT | Performed by: UROLOGY

## 2021-09-17 PROCEDURE — 6360000002 HC RX W HCPCS: Performed by: UROLOGY

## 2021-09-17 PROCEDURE — 2580000003 HC RX 258: Performed by: UROLOGY

## 2021-09-17 PROCEDURE — 2580000003 HC RX 258: Performed by: NURSE PRACTITIONER

## 2021-09-17 PROCEDURE — 6370000000 HC RX 637 (ALT 250 FOR IP): Performed by: UROLOGY

## 2021-09-17 PROCEDURE — 82947 ASSAY GLUCOSE BLOOD QUANT: CPT

## 2021-09-17 PROCEDURE — 80048 BASIC METABOLIC PNL TOTAL CA: CPT

## 2021-09-17 PROCEDURE — 87040 BLOOD CULTURE FOR BACTERIA: CPT

## 2021-09-17 PROCEDURE — 85025 COMPLETE CBC W/AUTO DIFF WBC: CPT

## 2021-09-17 PROCEDURE — G0378 HOSPITAL OBSERVATION PER HR: HCPCS

## 2021-09-17 PROCEDURE — 2580000003 HC RX 258: Performed by: INTERNAL MEDICINE

## 2021-09-17 PROCEDURE — 81001 URINALYSIS AUTO W/SCOPE: CPT

## 2021-09-17 PROCEDURE — 93005 ELECTROCARDIOGRAM TRACING: CPT | Performed by: NURSE PRACTITIONER

## 2021-09-17 RX ORDER — 0.9 % SODIUM CHLORIDE 0.9 %
1000 INTRAVENOUS SOLUTION INTRAVENOUS ONCE
Status: COMPLETED | OUTPATIENT
Start: 2021-09-17 | End: 2021-09-17

## 2021-09-17 RX ORDER — NOREPINEPHRINE BIT/0.9 % NACL 16MG/250ML
2-100 INFUSION BOTTLE (ML) INTRAVENOUS CONTINUOUS
Status: DISCONTINUED | OUTPATIENT
Start: 2021-09-17 | End: 2021-09-18

## 2021-09-17 RX ORDER — SODIUM CHLORIDE, SODIUM LACTATE, POTASSIUM CHLORIDE, CALCIUM CHLORIDE 600; 310; 30; 20 MG/100ML; MG/100ML; MG/100ML; MG/100ML
INJECTION, SOLUTION INTRAVENOUS CONTINUOUS
Status: DISCONTINUED | OUTPATIENT
Start: 2021-09-17 | End: 2021-09-21 | Stop reason: HOSPADM

## 2021-09-17 RX ADMIN — METFORMIN HYDROCHLORIDE 1000 MG: 500 TABLET, FILM COATED ORAL at 17:30

## 2021-09-17 RX ADMIN — IBUPROFEN 600 MG: 400 TABLET ORAL at 10:18

## 2021-09-17 RX ADMIN — SODIUM CHLORIDE, SODIUM LACTATE, POTASSIUM CHLORIDE, AND CALCIUM CHLORIDE: 600; 310; 30; 20 INJECTION, SOLUTION INTRAVENOUS at 15:09

## 2021-09-17 RX ADMIN — ATORVASTATIN CALCIUM 20 MG: 20 TABLET, FILM COATED ORAL at 10:17

## 2021-09-17 RX ADMIN — SODIUM CHLORIDE: 9 INJECTION, SOLUTION INTRAVENOUS at 03:13

## 2021-09-17 RX ADMIN — AZTREONAM 2000 MG: 1 INJECTION, POWDER, LYOPHILIZED, FOR SOLUTION INTRAMUSCULAR; INTRAVENOUS at 14:21

## 2021-09-17 RX ADMIN — Medication 4 MCG/MIN: at 14:48

## 2021-09-17 RX ADMIN — CIPROFLOXACIN 400 MG: 2 INJECTION INTRAVENOUS at 03:13

## 2021-09-17 RX ADMIN — GABAPENTIN 400 MG: 400 CAPSULE ORAL at 14:21

## 2021-09-17 RX ADMIN — METFORMIN HYDROCHLORIDE 1000 MG: 500 TABLET, FILM COATED ORAL at 10:17

## 2021-09-17 RX ADMIN — SODIUM CHLORIDE 1000 ML: 9 INJECTION, SOLUTION INTRAVENOUS at 11:18

## 2021-09-17 RX ADMIN — IBUPROFEN 600 MG: 400 TABLET ORAL at 21:30

## 2021-09-17 RX ADMIN — GABAPENTIN 400 MG: 400 CAPSULE ORAL at 21:30

## 2021-09-17 RX ADMIN — MIRTAZAPINE 30 MG: 15 TABLET, FILM COATED ORAL at 21:31

## 2021-09-17 RX ADMIN — CIPROFLOXACIN 400 MG: 2 INJECTION INTRAVENOUS at 15:08

## 2021-09-17 RX ADMIN — PANTOPRAZOLE SODIUM 40 MG: 40 TABLET, DELAYED RELEASE ORAL at 06:28

## 2021-09-17 RX ADMIN — SODIUM CHLORIDE 1000 ML: 9 INJECTION, SOLUTION INTRAVENOUS at 07:45

## 2021-09-17 RX ADMIN — DONEPEZIL HYDROCHLORIDE 10 MG: 5 TABLET, FILM COATED ORAL at 21:31

## 2021-09-17 ASSESSMENT — ENCOUNTER SYMPTOMS
DIARRHEA: 0
COLOR CHANGE: 0
ABDOMINAL DISTENTION: 0
BLOOD IN STOOL: 0
CONSTIPATION: 0
ABDOMINAL PAIN: 0
WHEEZING: 0
NAUSEA: 0
CHEST TIGHTNESS: 0
ABDOMINAL PAIN: 1
COUGH: 0
SHORTNESS OF BREATH: 0
VOMITING: 0

## 2021-09-17 ASSESSMENT — PAIN SCALES - GENERAL
PAINLEVEL_OUTOF10: 0
PAINLEVEL_OUTOF10: 0
PAINLEVEL_OUTOF10: 4

## 2021-09-17 NOTE — OP NOTE
the  procedure with continued retention, and injury to adjacent organs. He  does understand that when he wakes up he will have the suprapubic  catheter still in place and urethral catheter in place. We will plan to  leave the suprapubic tube in place until we make sure he is voiding and  to make sure he has not had recurrent contracture. It should be noted  that when I saw him approximately one month post TURP, he was voiding  without difficulty at that time and his residual was only 21 mL. The  other risks and complications were discussed with him. DESCRIPTION OF PROCEDURE:  The patient was brought to the operating  room, underwent general anesthetic. His suprapubic region as well as  suprapubic catheter as well as his genitalia were prepped and draped per  routine sterile fashion. He was placed in the lithotomy position and he  was prepped as described, and the patient received a preoperative  antibiotic and time-out was performed. I performed standard retrograde cystoscopy with the rigid cystoscope. The 22-Malawian cystoscope was inserted into the urethral meatus and  advanced under direct vision. Penile, bulbar and membranous urethra  appeared to be normal.  Entering the prostatic urethra, he had a  well-resected prostatic fossa, but again the bladder neck was completely  occluded. I could see a couple of little dimples, but I really never  could make out an opening doing retrograde cystoscopy. Therefore, I  removed his 16-Malawian suprapubic catheter and then placed the flexible  cystoscope through the suprapubic tract into the patient's bladder. I  then did antegrade cystoscopy with the flexible cystoscope and I could  identify the bladder neck.   There was a pinpoint opening on the bladder  side, so I passed Amplatz Super Stiff guidewire through this opening and  it actually did come across the bladder neck contracture into the  prostatic urethra where it could be seen with a retrograde cystoscope. Therefore, this was grasped with an alligator grasper and then brought  out through the meatus. So, I now had a wire through-and-through from  the suprapubic tract across the bladder neck pinpoint contracture and  then out the meatus. I then looked back in with the 24-Barbadian resectoscope sheath and this  was passed under direct vision without difficulty. Once I got into the  prostatic fossa, I could see the wire coming out of the bladder neck and  basically, I cut next to the wire at the 3 and 9 o'clock position until  I could create an opening that I could see in the bladder. I then  further extended the incision at the 12 o'clock position until I could  get inside the bladder. At this point, I then went ahead and placed  through the suprapubic tract a new 16-Barbadian Harper catheter. This was  confirmed to be in the bladder under direct vision. Then, 10 mL was  placed in the balloon. This was pulled up through the abdominal wall  and seated against the bladder neck. This was secured in place with 2-0  silk stitches. This was left to gravity drainage, so as I distended the  bladder, the bladder would be decompressed. I then pulled back into the  prostatic urethra and then extended my 3 and 9 o'clock incisions of the  bladder neck contracture until this was widely cut open. I also cut at  12 o'clock just a small amount, maybe two times the length of the  Hoskins knife just to make sure this was open and then an incision was  made just to take off any posterior bar through the 6 o'clock position. This created a wide open bladder neck. There were some bleeding vessels  on the left side that were cauterized with the tip of the Hoskins knife  with pinpoint cauterization, which controlled the bleeding. At this  point, then I withdrew the resectoscope. I placed a 20-Barbadian two-way  Harper through the penis and into the bladder; 10 mL was placed in the  balloon.   It was seated against the bladder neck. This hand-irrigated  easily with a Khari syringe; however, it was Will-Aid colored;  therefore, I left it to go ahead and start CBI. The inflow was through  the suprapubic catheter and the tubing was connected with a [place] connection to the 16-Liberian Harper. This was inflowed with normal  saline and the 22-Liberian urethral catheter was placed to gravity  drainage. At this point, the procedure was terminated. It was elected  to bring him in to the hospital for observation overnight because of the  need for CBI and to help manage any pain. The estimated blood loss was  approximately 20 mL. He was then awakened from his anesthetic and taken  to the recovery room in stable condition. The suprapubic tube was  secured and a dressing was placed. Urethral catheter was secured with a  leg StatLock. He was awakened from his anesthetic and taken to the  recovery room in stable condition. The plan will be to wean the CBI overnight and then let him go home  tomorrow. We will plug the suprapubic tube leaving his urethral  catheter to gravity drainage x7 days and then remove the urethral  catheter to allow him to void and leave the suprapubic tube in place to  make sure that the patient is voiding without significant residual and  there is no recurrent bladder neck contracture.         Regina Giordano MD    D: 09/16/2021 17:41:38      T: 09/17/2021 4:14:55     [email or web address]  Job#: [id number]     Doc#: [id number]    CC:

## 2021-09-17 NOTE — CONSULTS
INITIAL CONSULTATION   ICU bed-147  Raz Quiros BRYAN Sal  Chief Complaint  Hypotension. S/P cystoscopy with suprapubic catheter exchange with transurethral incision of bladder neck contracture on 9/16/201    Current Status/[place]  Currently in ICU in stable condition. The patient began to have hypotension on 9/16/21 around 2199 with systolic in 86'N. Advised that he did have CBI until ~0200 on 9/17/21. Nursing noted report that he began to have poor urine output (~25ml/hr) at 0435. He had worsening  Hypotension (systolic's 30'M) with decreased urine output and was ultimately transferred to the ICU for supportive care to include vasopressors. He has remained afebrile with RRR. LA-3.0. Moderate leukocytosis WBC-18.0 H-10.4/H-33.3. Mild hyponatremia at 135 BUN-16 Creatinine-1.1. Intensivist consult for supportive management of hypotension likely secondary to sepsis. The patient is A&Ox3. He c/o finger tingling while hypotensive but has reported improvement with pressor and gabapentin. His only complaint is being cold and tenderness at the [facility] site. BP is currently stable at 4 mcg/min Levophed.      Past Medical History Complicated by  Past Medical History:   Diagnosis Date    Arthritis     Bladder neck contracture     Diabetes mellitus ([facility] [address]     History of blood transfusion     Hyperlipidemia     Hypertension     Immunization due     Neuropathy      Vitals:    09/17/21 1343 09/17/21 1400 09/17/21 1500 09/17/21 1600   BP: (!) 82/55 (!) 75/51 89/62 (!) 102/58   Pulse: 68 66 68 73   Resp: 23 17 16 18   Temp:    97 °F (36.1 °C)   TempSrc:    Temporal   SpO2: 98% 91% 96% 95%   Weight:       Height:           Past Surgical History  Past Surgical History:   Procedure Laterality Date    COLONOSCOPY      CYSTOSCOPY N/A 8/24/2021    SUPRAPUBIC TUBE PLACEMENT performed by Enrique Cole MD at [facility] N/A 9/16/2021    CYSTOSCOPY TRANSURETHRAL inison BLADDER Bladder neck contracture ; exchange supra pubic catheter performed by Kindra [facility] MD at [facility], DIAGNOSTIC      EYE SURGERY Bilateral     cataract    NOSE SURGERY      SKIN BIOPSY      TONSILLECTOMY      TURP N/A 2021    TRANSURETHRAL RESECTION PROSTATE performed by Kindra Rodriguez MD at [address] [place] [address] History  History reviewed. No pertinent family history. Social History  Social History     Socioeconomic History    Marital status:      Spouse name: None    Number of children: 0    Years of education: None    Highest education level: None   Occupational History    None   Tobacco Use    Smoking status: Former Smoker     Types: Cigars     Quit date:      Years since quittin.7    Smokeless tobacco: Never Used   Vaping Use    Vaping Use: Never used   Substance and Sexual Activity    Alcohol use: Not Currently    Drug use: Never    Sexual activity: None   Other Topics Concern    None   Social History Narrative    None     Social Determinants of Health     Financial Resource Strain:     Difficulty of Paying Living Expenses:    Food Insecurity:     Worried About Running Out of Food in the Last Year:     Ran Out of Food in the Last Year:    Transportation Needs:     Lack of Transportation (Medical):  Lack of Transportation (Non-Medical):    Physical Activity:     Days of Exercise per Week:     Minutes of Exercise per Session:    Stress:     Feeling of Stress :    Social Connections:     Frequency of Communication with Friends and Family:     Frequency of Social Gatherings with Friends and Family:     Attends [employer] Services:     Active Member of Clubs or Organizations:     Attends Club or Organization Meetings:     Marital Status:    Intimate Partner Violence:     Fear of Current or Ex-Partner:     Emotionally Abused:     Physically Abused:     Sexually Abused:         Allergies  Allergies   Allergen Reactions    Pcn [Penicillins] Hives     \"Huge dose of PCN\"       Current Medications  Current Facility-Administered Medications   Medication Dose Route Frequency Provider Last Rate Last Admin    norepinephrine (LEVOPHED) 16 mg in sodium chloride 0.9 % 250 mL infusion  2-100 mcg/min IntraVENous Continuous Yen Shea MD 3.8 mL/hr at 09/17/21 1448 4 mcg/min at 09/17/21 1448    lactated ringers infusion   IntraVENous Continuous Yen Shea  mL/hr at 09/17/21 1509 New Bag at 09/17/21 1509    metFORMIN (GLUCOPHAGE) tablet 1,000 mg  1,000 mg Oral BID WC Abdi Charles MD   1,000 mg at 09/17/21 1017    pantoprazole (PROTONIX) tablet 40 mg  40 mg Oral QAM AC Abdi Charles MD   40 mg at 09/17/21 3633    gabapentin (NEURONTIN) capsule 400 mg  400 mg Oral TID Abdi Charles MD   400 mg at 09/17/21 1421    guaiFENesin tablet 400 mg  400 mg Oral 4x Daily PRN Abdi Charles MD        mirtazapine (REMERON) tablet 30 mg  30 mg Oral Nightly Abdi Charles MD   30 mg at 09/16/21 2213    atorvastatin (LIPITOR) tablet 20 mg  20 mg Oral Daily Abdi Charles MD   20 mg at 09/17/21 1017    verapamil (CALAN) tablet 120 mg  120 mg Oral BID Abdi Charles MD   120 mg at 09/16/21 2242    donepezil (ARICEPT) tablet 10 mg  10 mg Oral Nightly Abdi Charles MD   10 mg at 09/16/21 2212    melatonin tablet 3 mg  3 mg Oral Nightly PRN Abdi Charles MD        ibuprofen (ADVIL;MOTRIN) tablet 600 mg  600 mg Oral BID Abdi Charles MD   600 mg at 09/17/21 1018    acetaminophen (TYLENOL) tablet 1,000 mg  1,000 mg Oral 4x Daily PRN Abdi Charles MD        sodium chloride flush 0.9 % injection 5-40 mL  5-40 mL IntraVENous 2 times per day Abdi Charles MD        sodium chloride flush 0.9 % injection 5-40 mL  5-40 mL IntraVENous PRN Abdi Charles MD        0.9 % sodium chloride infusion  25 mL IntraVENous PRN Abdi Charles MD        polyethylene glycol [facility]) packet 17 g  17 g Oral Daily PRN Kadeem Feeling Jessica Sarkar MD        ondansetron (ZOFRAN-ODT) disintegrating tablet 4 mg  4 mg Oral Q8H PRN Abdi Charles MD        Or    ondansetron [facility]) injection 4 mg  4 mg IntraVENous Q6H PRN Abdi Charles MD   4 mg at 09/16/21 2015    ciprofloxacin (CIPRO) IVPB 400 mg  400 mg IntraVENous Q12H Abdi Charles  mL/hr at 09/17/21 1508 400 mg at 09/17/21 1508    HYDROcodone-acetaminophen (NORCO) 5-325 MG per tablet 1 tablet  1 tablet Oral Q4H PRN Abdi Charles MD        Or    HYDROcodone-acetaminophen [facility]) 5-325 MG per tablet 2 tablet  2 tablet Oral Q4H PRN Abdi Charles MD        morphine (PF) injection 2 mg  2 mg IntraVENous Q2H PRN Abdi Charles MD        Or    morphine injection 4 mg  4 mg IntraVENous Q2H PRN Abdi Charles MD           Review of Systems  Review of Systems   Constitutional: Positive for fatigue. Negative for chills, diaphoresis and fever. HENT: Negative for congestion and ear pain. Eyes: Negative for visual disturbance. Respiratory: Negative for cough, shortness of breath and wheezing. Cardiovascular: Negative for chest pain, palpitations and leg swelling. Gastrointestinal: Positive for abdominal pain (At suprapubic site). Negative for abdominal distention, blood in stool, constipation, diarrhea, nausea and vomiting. Endocrine: Negative for cold intolerance and heat intolerance. Genitourinary: Negative for difficulty urinating, flank pain, frequency and urgency. Musculoskeletal: Negative for arthralgias and myalgias. Skin: Negative for color change and wound. Neurological: Negative for dizziness, syncope, weakness, light-headedness, numbness and headaches. Hematological: Does not bruise/bleed easily. Psychiatric/Behavioral: Negative for agitation, confusion and dysphoric mood. Physical Exam  Physical Exam  Constitutional:       General: He is not in acute distress. Appearance: Normal appearance. He is not ill-appearing. HENT:      Head: Normocephalic and atraumatic. Right Ear: External ear normal.      Left Ear: External ear normal.      Nose: Nose normal.      Mouth/Throat:      Mouth: Mucous membranes are moist.   Eyes:      Extraocular Movements: Extraocular movements intact. Conjunctiva/sclera: Conjunctivae normal.      Pupils: Pupils are equal, round, and reactive to light. Cardiovascular:      Rate and Rhythm: Normal rate and regular rhythm. Pulses: Normal pulses. Heart sounds: Normal heart sounds. Pulmonary:      Effort: Pulmonary effort is normal. No respiratory distress. Breath sounds: Normal breath sounds. No wheezing, rhonchi or rales. Abdominal:      General: Bowel sounds are normal. There is no distension. Palpations: Abdomen is soft. Tenderness: There is abdominal tenderness (To palpation of SPC area). Genitourinary:     Comments: Dried blood to catheter area. No other drainage noted. Harper cath in place. Adequate clear urine to BSD  Musculoskeletal:         General: No swelling, tenderness or deformity. Normal range of motion. Cervical back: Normal range of motion and neck supple. No muscular tenderness. Right lower leg: No edema. Left lower leg: No edema. Skin:     General: Skin is warm and dry. Findings: No bruising or lesion. Neurological:      Mental Status: He is alert and oriented to person, place, and time. Psychiatric:         Mood and Affect: Mood normal.         Behavior: Behavior normal.         Thought Content: Thought content normal.         Assessment  Hypotension  R/O sepsis secondary to CBI vs. Ongoing urinary retention   S/P SP catheter exchange with transurethral incision of the bladder  Urinary retention  Bladder neck contracture    Plan  -Remain in ICU overnight, titrate norepinephrine to keep MAP greater than 60.  -Monitor HR-currently 70.  Likely not tachycardic with hypotension secondary to CCB BID  -Hold BP meds  -Monitor temp Q 4 hours  -I&O Q4 hours, notify if urine output less than 25ml/hr  -Avoid CBI or frequent bladder flushes if possible  -Continue LR at 100 ml/hr  -Continue current antibiotics (cipro)  -UA with culture will monitor  -Continue to monitor pending blood cultures  -Labs in the AM    Total critical care time reviewing labs, medical hx, assessment, vital signs, chart review and plan-30 minutes

## 2021-09-17 NOTE — PROGRESS NOTES
Dr. Tika Mcclure calling service was called at [phone] due to the patients blood pressure being 70/40 manually. Also, his urine output after 3 hours of stopping the CBI was 70 mL. Dr. Jonas De La Rosa was on call and was paged. Still waiting for a response. Pt. BP at 0545 was 74/51. Will continue to monitor. Clear bilaterally, pupils equal, round and reactive to light.

## 2021-09-17 NOTE — PROGRESS NOTES
Urology Progress Note      SUBJECTIVE: Patient voices no complaints except for complaining about his tubing from his catheter he denies dizziness denies chest pain denies shortness of breath no abdominal pain no nausea vomiting    OBJECTIVE: No fever but has been hypotensive systolic blood pressure in the 80s without tachycardia. He did receive his blood pressure medicines decreased urine output    REVIEW OF SYSTEMS:   Review of Systems   Constitutional: Negative for chills and fever. Respiratory: Negative for chest tightness and shortness of breath. Cardiovascular: Negative for chest pain, palpitations and leg swelling. Gastrointestinal: Negative for abdominal pain. Genitourinary: Positive for decreased urine volume and hematuria. Physical  VITALS:  BP (!) 80/58   Pulse 67   Temp 97.7 °F (36.5 °C) (Oral)   Resp 14   Ht 5' 7\" (1.702 m)   Wt 140 lb (63.5 kg)   SpO2 96%   BMI 21.93 kg/m²   TEMPERATURE:  Current - Temp: 97.7 °F (36.5 °C); Max - Temp  Av °F (36.1 °C)  Min: 94.6 °F (34.8 °C)  Max: 98.2 °F (36.8 °C)   24 HR I&O   Intake/Output Summary (Last 24 hours) at 2021 0840  Last data filed at 2021 0457  Gross per 24 hour   Intake 2837.2 ml   Output 2585 ml   Net 252.2 ml     BACK: no tenderness in spine or flanks  ABDOMEN:  soft, non-distended and non-tender  HEART:  normal rate, regular rhythm and intact distal pulses  CHEST:  Clear Auscultation  GENITAL/URINARY: Appearing external genitalia. He is off CBI. Suprapubic site is nontender looks clean with dressing intact. Urine draining from urethral catheter is slight blood-tinged.     Data       CBC:   Recent Labs     09/15/21  1112 21  0543   WBC 7.1 23.8*   HGB 13.6* 11.1*   HCT 43.3 35.1*    191     BMP:    Recent Labs     09/15/21  1112 21  0543    135*   K 4.1 3.8    99   CO2 27 28   BUN 20 16   CREATININE 0.6 1.1   GLUCOSE 111* 142*       ASSESSMENT AND PLAN    Patient Active Problem List   Diagnosis    Benign prostatic hyperplasia with urinary retention    Bladder neck contracture    Urinary retention       1. Hypotension without tachycardia really no other physical findings signs or symptoms. H&H is okay though he does have leukocytosis without fever. We will give a fluid bolus and reassess repeat H&H later today. If he should have fever or continued hypotension consider initiation of sepsis bundle as he did have a chronic indwelling catheter could have some bacteremia from the procedure. 2.  Bladder neck contracture. Status post incision bladder neck contracture. He was on CBI overnight with the inflow through the suprapubic catheter. We will plug a suprapubic tube and leave the urethral catheter to gravity drainage. He will need the catheter in for 7 to 10 days.   We will leave the suprapubic tube plugged as a way to check residual or drain his bladder should he not void after catheter removal.    Kelly Paris MD

## 2021-09-17 NOTE — PROGRESS NOTES
Granger catheter emptied at 0430, 50 mL output since 0235. This nurse called the charge nurse Jan El to come assess the catheter since the output was on the lower end. There were no clots present in the urine drainage bag or the granger tubing. The 3 way catheter was irrigated to check for clots, and there were none present. Will continue to monitor I&O's closely.

## 2021-09-18 LAB
ALBUMIN SERPL-MCNC: 2.9 G/DL (ref 3.5–5.2)
ALP BLD-CCNC: 87 U/L (ref 40–130)
ALT SERPL-CCNC: 16 U/L (ref 5–41)
ANION GAP SERPL CALCULATED.3IONS-SCNC: 11 MMOL/L (ref 7–19)
AST SERPL-CCNC: 26 U/L (ref 5–40)
BASOPHILS ABSOLUTE: 0 K/UL (ref 0–0.2)
BASOPHILS RELATIVE PERCENT: 0.2 % (ref 0–1)
BILIRUB SERPL-MCNC: <0.2 MG/DL (ref 0.2–1.2)
BUN BLDV-MCNC: 13 MG/DL (ref 8–23)
CALCIUM SERPL-MCNC: 8.2 MG/DL (ref 8.8–10.2)
CHLORIDE BLD-SCNC: 109 MMOL/L (ref 98–111)
CO2: 22 MMOL/L (ref 22–29)
CREAT SERPL-MCNC: 0.7 MG/DL (ref 0.5–1.2)
EOSINOPHILS ABSOLUTE: 0.5 K/UL (ref 0–0.6)
EOSINOPHILS RELATIVE PERCENT: 2.8 % (ref 0–5)
GFR AFRICAN AMERICAN: >59
GFR NON-AFRICAN AMERICAN: >60
GLUCOSE BLD-MCNC: 110 MG/DL (ref 70–99)
GLUCOSE BLD-MCNC: 133 MG/DL (ref 74–109)
HCT VFR BLD CALC: 37.5 % (ref 42–52)
HEMOGLOBIN: 11.6 G/DL (ref 14–18)
IMMATURE GRANULOCYTES #: 0.1 K/UL
LACTIC ACID: 2 MMOL/L (ref 0.5–1.9)
LYMPHOCYTES ABSOLUTE: 1.7 K/UL (ref 1.1–4.5)
LYMPHOCYTES RELATIVE PERCENT: 9.9 % (ref 20–40)
MAGNESIUM: 1.6 MG/DL (ref 1.6–2.4)
MCH RBC QN AUTO: 28.4 PG (ref 27–31)
MCHC RBC AUTO-ENTMCNC: 30.9 G/DL (ref 33–37)
MCV RBC AUTO: 91.7 FL (ref 80–94)
MONOCYTES ABSOLUTE: 0.8 K/UL (ref 0–0.9)
MONOCYTES RELATIVE PERCENT: 4.4 % (ref 0–10)
NEUTROPHILS ABSOLUTE: 14.1 K/UL (ref 1.5–7.5)
NEUTROPHILS RELATIVE PERCENT: 82.1 % (ref 50–65)
PDW BLD-RTO: 14.1 % (ref 11.5–14.5)
PERFORMED ON: ABNORMAL
PHOSPHORUS: 2.3 MG/DL (ref 2.5–4.5)
PLATELET # BLD: 190 K/UL (ref 130–400)
PMV BLD AUTO: 10.9 FL (ref 9.4–12.4)
POTASSIUM SERPL-SCNC: 3.6 MMOL/L (ref 3.5–5)
RBC # BLD: 4.09 M/UL (ref 4.7–6.1)
SODIUM BLD-SCNC: 142 MMOL/L (ref 136–145)
TOTAL PROTEIN: 5.2 G/DL (ref 6.6–8.7)
WBC # BLD: 17.2 K/UL (ref 4.8–10.8)

## 2021-09-18 PROCEDURE — 99024 POSTOP FOLLOW-UP VISIT: CPT | Performed by: NURSE PRACTITIONER

## 2021-09-18 PROCEDURE — 84100 ASSAY OF PHOSPHORUS: CPT

## 2021-09-18 PROCEDURE — 1210000000 HC MED SURG R&B

## 2021-09-18 PROCEDURE — 80053 COMPREHEN METABOLIC PANEL: CPT

## 2021-09-18 PROCEDURE — 83605 ASSAY OF LACTIC ACID: CPT

## 2021-09-18 PROCEDURE — 6360000002 HC RX W HCPCS: Performed by: UROLOGY

## 2021-09-18 PROCEDURE — 83735 ASSAY OF MAGNESIUM: CPT

## 2021-09-18 PROCEDURE — 36415 COLL VENOUS BLD VENIPUNCTURE: CPT

## 2021-09-18 PROCEDURE — 87086 URINE CULTURE/COLONY COUNT: CPT

## 2021-09-18 PROCEDURE — 6370000000 HC RX 637 (ALT 250 FOR IP): Performed by: UROLOGY

## 2021-09-18 PROCEDURE — 82947 ASSAY GLUCOSE BLOOD QUANT: CPT

## 2021-09-18 PROCEDURE — 2580000003 HC RX 258: Performed by: UROLOGY

## 2021-09-18 PROCEDURE — 85025 COMPLETE CBC W/AUTO DIFF WBC: CPT

## 2021-09-18 RX ADMIN — IBUPROFEN 600 MG: 400 TABLET ORAL at 22:00

## 2021-09-18 RX ADMIN — Medication 3 MG: at 21:59

## 2021-09-18 RX ADMIN — CIPROFLOXACIN 400 MG: 2 INJECTION INTRAVENOUS at 04:22

## 2021-09-18 RX ADMIN — PANTOPRAZOLE SODIUM 40 MG: 40 TABLET, DELAYED RELEASE ORAL at 08:30

## 2021-09-18 RX ADMIN — GABAPENTIN 400 MG: 400 CAPSULE ORAL at 14:02

## 2021-09-18 RX ADMIN — HYDROCODONE BITARTRATE AND ACETAMINOPHEN 2 TABLET: 5; 325 TABLET ORAL at 18:00

## 2021-09-18 RX ADMIN — HYDROCODONE BITARTRATE AND ACETAMINOPHEN 1 TABLET: 5; 325 TABLET ORAL at 08:29

## 2021-09-18 RX ADMIN — GABAPENTIN 400 MG: 400 CAPSULE ORAL at 22:00

## 2021-09-18 RX ADMIN — CIPROFLOXACIN 400 MG: 2 INJECTION INTRAVENOUS at 14:02

## 2021-09-18 RX ADMIN — SODIUM CHLORIDE, PRESERVATIVE FREE 10 ML: 5 INJECTION INTRAVENOUS at 08:30

## 2021-09-18 RX ADMIN — DONEPEZIL HYDROCHLORIDE 10 MG: 5 TABLET, FILM COATED ORAL at 21:59

## 2021-09-18 RX ADMIN — SODIUM CHLORIDE, PRESERVATIVE FREE 10 ML: 5 INJECTION INTRAVENOUS at 23:03

## 2021-09-18 RX ADMIN — METFORMIN HYDROCHLORIDE 1000 MG: 500 TABLET, FILM COATED ORAL at 16:50

## 2021-09-18 RX ADMIN — ATORVASTATIN CALCIUM 20 MG: 20 TABLET, FILM COATED ORAL at 08:29

## 2021-09-18 RX ADMIN — MIRTAZAPINE 30 MG: 15 TABLET, FILM COATED ORAL at 22:00

## 2021-09-18 RX ADMIN — GABAPENTIN 400 MG: 400 CAPSULE ORAL at 08:29

## 2021-09-18 RX ADMIN — IBUPROFEN 600 MG: 400 TABLET ORAL at 08:29

## 2021-09-18 RX ADMIN — METFORMIN HYDROCHLORIDE 1000 MG: 500 TABLET, FILM COATED ORAL at 08:29

## 2021-09-18 RX ADMIN — HYDROCODONE BITARTRATE AND ACETAMINOPHEN 1 TABLET: 5; 325 TABLET ORAL at 21:59

## 2021-09-18 ASSESSMENT — PAIN SCALES - GENERAL
PAINLEVEL_OUTOF10: 7
PAINLEVEL_OUTOF10: 2
PAINLEVEL_OUTOF10: 5
PAINLEVEL_OUTOF10: 0
PAINLEVEL_OUTOF10: 0
PAINLEVEL_OUTOF10: 2

## 2021-09-18 ASSESSMENT — ENCOUNTER SYMPTOMS
SHORTNESS OF BREATH: 0
ABDOMINAL PAIN: 1
CHEST TIGHTNESS: 0

## 2021-09-18 ASSESSMENT — PAIN DESCRIPTION - PAIN TYPE: TYPE: ACUTE PAIN;SURGICAL PAIN

## 2021-09-18 ASSESSMENT — PAIN DESCRIPTION - ORIENTATION: ORIENTATION: LOWER

## 2021-09-18 ASSESSMENT — PAIN DESCRIPTION - LOCATION: LOCATION: ABDOMEN

## 2021-09-18 NOTE — PROGRESS NOTES
Urology Progress Note    CC: \"I don't know how y'all work in this icebox. Am I wet? \"    SUBJECTIVE:  Patient resting in bed with eyes closed. Easily arouses to voice and tactile stimulation. Complains of some mild incisional pain to his suprapubic area and states he feels wet, but he is not leaking from his SPT or having bladder spasms. OBJECTIVE:   Review of Systems   Constitutional: Negative for chills and fever. Respiratory: Negative for chest tightness and shortness of breath. Cardiovascular: Negative for chest pain, palpitations and leg swelling. Gastrointestinal: Positive for abdominal pain (incisional). Genitourinary: Negative for decreased urine volume and hematuria. Physical  VITALS:  /73   Pulse 79   Temp 98.1 °F (36.7 °C) (Temporal)   Resp 14   Ht 5' 7\" (1.702 m)   Wt 140 lb (63.5 kg)   SpO2 99%   BMI 21.93 kg/m²   TEMPERATURE:  Current - Temp: 98.1 °F (36.7 °C); Max - Temp  Av °F (36.1 °C)  Min: 96.2 °F (35.7 °C)  Max: 98.1 °F (36.7 °C)   24 HR I&O     Intake/Output Summary (Last 24 hours) at 2021 0719  Last data filed at 2021 0400  Gross per 24 hour   Intake 2382.66 ml   Output 3025 ml   Net -642.34 ml     BACK: no tenderness in spine or flanks  ABDOMEN:  non-distended and tenderness noted near the incision for his SPT  HEART:  normal rate, normotensive this morning, off pressors  CHEST:  Normal respiratory effort  GENITAL/URINARY:  Normal appearing external genitalia. Off CBI with clear yellow urine from urethral catheter. SPT plugged with dressing in place to catheter.     Data  CBC:   Recent Labs     21  0543 21  1321 21  0146   WBC 23.8* 18.0* 17.2*   HGB 11.1* 10.4* 11.6*   HCT 35.1* 33.3* 37.5*    145 190     BMP:    Recent Labs     09/15/21  1112 21  0543 21  0146    135* 142   K 4.1 3.8 3.6    99 109   CO2 27 28 22   BUN 20 16 13   CREATININE 0.6 1.1 0.7   GLUCOSE 111* 142* 133*     U/A:    Lab Results Component Value Date    COLORU YELLOW 09/17/2021    PHUR 6.0 09/17/2021    WBCUA 6-9 09/17/2021    RBCUA 3-5 09/17/2021    YEAST 0 08/24/2021    BACTERIA 1+ 09/17/2021    CLARITYU Clear 09/17/2021    SPECGRAV 1.005 09/17/2021    LEUKOCYTESUR MODERATE 09/17/2021    UROBILINOGEN 0.2 09/17/2021    BILIRUBINUR Negative 09/17/2021    BLOODU LARGE 09/17/2021    GLUCOSEU Negative 09/17/2021       ASSESSMENT AND PLAN    Patient Active Problem List   Diagnosis    Benign prostatic hyperplasia with urinary retention    Bladder neck contracture    Urinary retention    Hypotension       1. Hypotension without tachycardia. This has basically resolved since being transferred to ICU. He has been normotensive for several hours without pressors. Can likely be moved to the floor today, but will defer decision to hospitalist team. Blood cultures pending. Leukocytosis improving slowly. Cipro continues.      2.  Bladder neck contracture. S/P incision bladder neck contracture. CBI has been DC and patient has clear yellow urine in catheter. SPT plugged with dressing CDI. He will need the urethral catheter at least 7-10 days and we will remove in the outpatient setting.  The SPT will remain in place as a way to check residual or drain bladder if he fails voiding trial.     BRYAN Pineda - CNP  9/18/2021 7:19 AM

## 2021-09-18 NOTE — PROGRESS NOTES
Hospitalist Progress Note  [facility]     Patient: Marcus Anderson  : 1934  MRN: [id number]  Code Status: Full Code    Hospital Day: 1   Date of Service: 2021    Subjective:   Patient seen and examined. No current complaints. Past Medical History:   Diagnosis Date    Arthritis     Bladder neck contracture     Diabetes mellitus ([address]     History of blood transfusion     Hyperlipidemia     Hypertension     Immunization due     Neuropathy        Past Surgical History:   Procedure Laterality Date    COLONOSCOPY      CYSTOSCOPY N/A 2021    SUPRAPUBIC TUBE PLACEMENT performed by Shivani Dolan MD at [facility] N/A 2021    CYSTOSCOPY TRANSURETHRAL inison BLADDER Bladder neck contracture ; exchange supra pubic catheter [facility] MD [facility], DIAGNOSTIC      EYE SURGERY Bilateral     cataract    NOSE SURGERY      SKIN BIOPSY      TONSILLECTOMY      TURP N/A 2021    TRANSURETHRAL RESECTION PROSTATE performed by Shivani Dolan MD at [address]       History reviewed. No pertinent family history. Social History     Socioeconomic History    Marital status:       Spouse name: Not on file    Number of children: 0    Years of education: Not on file    Highest education level: Not on file   Occupational History    Not on file   Tobacco Use    Smoking status: Former Smoker     Types: Cigars     Quit date: 2000     Years since quittin.7    Smokeless tobacco: Never Used   Vaping Use    Vaping Use: Never used   Substance and Sexual Activity    Alcohol use: Not Currently    Drug use: Never    Sexual activity: Not on file   Other Topics Concern    Not on file   Social History Narrative    Not on file     Social Determinants of Health     Financial Resource Strain:     Difficulty of Paying Living Expenses:    Food Insecurity:     Worried About Running Out of Food in the Last Year:     Ran Out of Food in the Last Year:    Transportation Needs:     Lack of Transportation (Medical):      Lack of Transportation (Non-Medical):    Physical Activity:     Days of Exercise per Week:     Minutes of Exercise per Session:    Stress:     Feeling of Stress :    Social Connections:     Frequency of Communication with Friends and Family:     Frequency of Social Gatherings with Friends and Family:     Attends Latter-day Services:     Active Member of Clubs or Organizations:     Attends Club or Organization Meetings:     Marital Status:    Intimate Partner Violence:     Fear of Current or Ex-Partner:     Emotionally Abused:     Physically Abused:     Sexually Abused:        Current Facility-Administered Medications   Medication Dose Route Frequency Provider Last Rate Last Admin    norepinephrine (LEVOPHED) 16 mg in sodium chloride 0.9 % 250 mL infusion  2-100 mcg/min IntraVENous Continuous Khushi Styles MD   Stopped at 09/18/21 0600    lactated ringers infusion   IntraVENous Continuous Khushi Styles  mL/hr at 09/17/21 1509 New Bag at 09/17/21 1509    metFORMIN (GLUCOPHAGE) tablet 1,000 mg  1,000 mg Oral BID WC Rohit Jameson MD   1,000 mg at 09/18/21 0829    pantoprazole (PROTONIX) tablet 40 mg  40 mg Oral QAM AC Rohit Jameson MD   40 mg at 09/18/21 0830    gabapentin (NEURONTIN) capsule 400 mg  400 mg Oral TID Rohit Jameson MD   400 mg at 09/18/21 4646    guaiFENesin tablet 400 mg  400 mg Oral 4x Daily PRN Rohit Jameson MD        mirtazapine (REMERON) tablet 30 mg  30 mg Oral Nightly Rohit Jameson MD   30 mg at 09/17/21 2131    atorvastatin (LIPITOR) tablet 20 mg  20 mg Oral Daily Rohit Jameson MD   20 mg at 09/18/21 0829    [Held by provider] verapamil (CALAN) tablet 120 mg  120 mg Oral BID Rohit Jameson MD   120 mg at 09/16/21 2242    donepezil (ARICEPT) tablet 10 mg  10 mg Oral Nightly Rohit Jameson MD   10 mg at 09/17/21 2131    melatonin tablet 3 mg  3 mg Oral Nightly PRN Dwight Colorado MD        ibuprofen (ADVIL;MOTRIN) tablet 600 mg  600 mg Oral BID Dwight Colorado MD   600 mg at 09/18/21 2066    acetaminophen (TYLENOL) tablet 1,000 mg  1,000 mg Oral 4x Daily PRN Dwight Colorado MD        sodium chloride flush 0.9 % injection 5-40 mL  5-40 mL IntraVENous 2 times per day Dwight Colorado MD   10 mL at 09/18/21 0830    sodium chloride flush 0.9 % injection 5-40 mL  5-40 mL IntraVENous PRN Dwight Colorado MD        0.9 % sodium chloride infusion  25 mL IntraVENous PRN Dwight Colorado MD        polyethylene glycol [facility]) packet 17 g  17 g Oral Daily PRN Dwight Colorado MD        ondansetron (ZOFRAN-ODT) disintegrating tablet 4 mg  4 mg Oral Q8H PRN Dwight Colorado MD        Or    ondansetron [facility]) injection 4 mg  4 mg IntraVENous Q6H PRN Dwight Colorado MD   4 mg at 09/16/21 2015    ciprofloxacin (CIPRO) IVPB 400 mg  400 mg IntraVENous Q12H Dwight Colorado MD   Stopped at 09/18/21 0522    HYDROcodone-acetaminophen (NORCO) 5-325 MG per tablet 1 tablet  1 tablet Oral Q4H PRN Dwight Colorado MD   1 tablet at 09/18/21 1066    Or    HYDROcodone-acetaminophen (NORCO) 5-325 MG per tablet 2 tablet  2 tablet Oral Q4H PRN Dwight Colorado MD        morphine (PF) injection 2 mg  2 mg IntraVENous Q2H PRN Dwight Colorado MD        Or    morphine injection 4 mg  4 mg IntraVENous Q2H PRN Dwight Colorado MD             norepinephrine-sodium chloride Stopped (09/18/21 0600)    lactated ringers 100 mL/hr at 09/17/21 1509    sodium chloride          Objective:   BP (!) 101/59   Pulse 82   Temp 97.2 °F (36.2 °C) (Temporal)   Resp 19   Ht 5' 7\" (1.702 m)   Wt 140 lb (63.5 kg)   SpO2 99%   BMI 21.93 kg/m²     General: no acute distress  HEENT: normocephalic, atraumatic  Neck: supple, symmetrical, trachea midline   Lungs: clear to auscultation bilaterally   Cardiovascular: s1 and s2 normal  Abdomen: soft, positive bowel sounds  Extremities: no edema or cyanosis   Neuro: aaox3, no focal deficits   Skin: normal color and texture     Recent Labs     09/17/21  0543 09/17/21  1321 09/18/21  0146   WBC 23.8* 18.0* 17.2*   RBC 3.91* 3.62* 4.09*   HGB 11.1* 10.4* 11.6*   HCT 35.1* 33.3* 37.5*   MCV 89.8 92.0 91.7   MCH 28.4 28.7 28.4   MCHC 31.6* 31.2* 30.9*    145 190     Recent Labs     09/17/21  0543 09/18/21  0146   * 142   K 3.8 3.6   ANIONGAP 8 11   CL 99 109   CO2 28 22   BUN 16 13   CREATININE 1.1 0.7   GLUCOSE 142* 133*   CALCIUM 8.5* 8.2*     Recent Labs     09/18/21  0146   MG 1.6   PHOS 2.3*     Recent Labs     09/18/21  0146   AST 26   ALT 16   BILITOT <0.2   ALKPHOS 87     No results for input(s): PH, PO2, PCO2, HCO3, BE, O2SAT in the last 72 hours. Recent Labs     09/17/21  1321   TROPONINI <0.01     No results for input(s): INR in the last 72 hours. Recent Labs     09/17/21 2005   LACTA 2.7*         Intake/Output Summary (Last 24 hours) at 9/18/2021 1351  Last data filed at 9/18/2021 1200  Gross per 24 hour   Intake 3496.82 ml   Output 3975 ml   Net -478.18 ml       No results found.      Assessment and Plan:   Hypotension  Resolved  Suspect secondary to volume depletion  AAOx3  Normal respiratory rate  Afebrile  Leukocytosis improving  Low-dose norepinephrine gtt since DC'd  IVF  Trend mildly elevated lactic acid to clearance  Follow hemoglobin  Avoid offending agents    Bladder neck contracture  Urology primary  S/p transurethral incision of bladder neck contracture 9/16/2021 per Dr. Kathy Galan acute cystitis  Possibly secondary to above  Empiric ciprofloxacin while awaiting urine culture    DM2  Meds on board    DVT prophylaxis  SCDs    Total critical care time: 45 minutes    Pb Abbasi MD   9/18/2021 1:51 PM

## 2021-09-19 LAB
ALBUMIN SERPL-MCNC: 2.8 G/DL (ref 3.5–5.2)
ALP BLD-CCNC: 87 U/L (ref 40–130)
ALT SERPL-CCNC: 14 U/L (ref 5–41)
ANION GAP SERPL CALCULATED.3IONS-SCNC: 7 MMOL/L (ref 7–19)
AST SERPL-CCNC: 19 U/L (ref 5–40)
BASOPHILS ABSOLUTE: 0 K/UL (ref 0–0.2)
BASOPHILS RELATIVE PERCENT: 0.4 % (ref 0–1)
BILIRUB SERPL-MCNC: <0.2 MG/DL (ref 0.2–1.2)
BUN BLDV-MCNC: 11 MG/DL (ref 8–23)
CALCIUM SERPL-MCNC: 8.3 MG/DL (ref 8.8–10.2)
CHLORIDE BLD-SCNC: 108 MMOL/L (ref 98–111)
CO2: 24 MMOL/L (ref 22–29)
CREAT SERPL-MCNC: 0.6 MG/DL (ref 0.5–1.2)
EKG P AXIS: NORMAL DEGREES
EKG P-R INTERVAL: NORMAL MS
EKG Q-T INTERVAL: 426 MS
EKG QRS DURATION: 100 MS
EKG QTC CALCULATION (BAZETT): 433 MS
EKG T AXIS: 27 DEGREES
EOSINOPHILS ABSOLUTE: 0.4 K/UL (ref 0–0.6)
EOSINOPHILS RELATIVE PERCENT: 4.8 % (ref 0–5)
GFR AFRICAN AMERICAN: >59
GFR NON-AFRICAN AMERICAN: >60
GLUCOSE BLD-MCNC: 114 MG/DL (ref 74–109)
HCT VFR BLD CALC: 34.8 % (ref 42–52)
HEMOGLOBIN: 10.9 G/DL (ref 14–18)
IMMATURE GRANULOCYTES #: 0 K/UL
LYMPHOCYTES ABSOLUTE: 1.6 K/UL (ref 1.1–4.5)
LYMPHOCYTES RELATIVE PERCENT: 19.3 % (ref 20–40)
MAGNESIUM: 1.6 MG/DL (ref 1.6–2.4)
MCH RBC QN AUTO: 29 PG (ref 27–31)
MCHC RBC AUTO-ENTMCNC: 31.3 G/DL (ref 33–37)
MCV RBC AUTO: 92.6 FL (ref 80–94)
MONOCYTES ABSOLUTE: 0.6 K/UL (ref 0–0.9)
MONOCYTES RELATIVE PERCENT: 6.8 % (ref 0–10)
NEUTROPHILS ABSOLUTE: 5.7 K/UL (ref 1.5–7.5)
NEUTROPHILS RELATIVE PERCENT: 68.3 % (ref 50–65)
PDW BLD-RTO: 14.1 % (ref 11.5–14.5)
PLATELET # BLD: 171 K/UL (ref 130–400)
PMV BLD AUTO: 10.8 FL (ref 9.4–12.4)
POTASSIUM SERPL-SCNC: 4.1 MMOL/L (ref 3.5–5)
RBC # BLD: 3.76 M/UL (ref 4.7–6.1)
SODIUM BLD-SCNC: 139 MMOL/L (ref 136–145)
TOTAL PROTEIN: 4.5 G/DL (ref 6.6–8.7)
WBC # BLD: 8.3 K/UL (ref 4.8–10.8)

## 2021-09-19 PROCEDURE — 99024 POSTOP FOLLOW-UP VISIT: CPT | Performed by: NURSE PRACTITIONER

## 2021-09-19 PROCEDURE — 2580000003 HC RX 258: Performed by: INTERNAL MEDICINE

## 2021-09-19 PROCEDURE — 93010 ELECTROCARDIOGRAM REPORT: CPT | Performed by: INTERNAL MEDICINE

## 2021-09-19 PROCEDURE — 6370000000 HC RX 637 (ALT 250 FOR IP): Performed by: UROLOGY

## 2021-09-19 PROCEDURE — 83735 ASSAY OF MAGNESIUM: CPT

## 2021-09-19 PROCEDURE — 2580000003 HC RX 258: Performed by: UROLOGY

## 2021-09-19 PROCEDURE — 6360000002 HC RX W HCPCS: Performed by: UROLOGY

## 2021-09-19 PROCEDURE — 1210000000 HC MED SURG R&B

## 2021-09-19 PROCEDURE — 36415 COLL VENOUS BLD VENIPUNCTURE: CPT

## 2021-09-19 PROCEDURE — 85025 COMPLETE CBC W/AUTO DIFF WBC: CPT

## 2021-09-19 PROCEDURE — 80053 COMPREHEN METABOLIC PANEL: CPT

## 2021-09-19 RX ADMIN — ATORVASTATIN CALCIUM 20 MG: 20 TABLET, FILM COATED ORAL at 08:06

## 2021-09-19 RX ADMIN — SODIUM CHLORIDE, PRESERVATIVE FREE 10 ML: 5 INJECTION INTRAVENOUS at 08:06

## 2021-09-19 RX ADMIN — GABAPENTIN 400 MG: 400 CAPSULE ORAL at 20:11

## 2021-09-19 RX ADMIN — CIPROFLOXACIN 400 MG: 2 INJECTION INTRAVENOUS at 14:15

## 2021-09-19 RX ADMIN — METFORMIN HYDROCHLORIDE 1000 MG: 500 TABLET, FILM COATED ORAL at 17:25

## 2021-09-19 RX ADMIN — CIPROFLOXACIN 400 MG: 2 INJECTION INTRAVENOUS at 03:53

## 2021-09-19 RX ADMIN — IBUPROFEN 600 MG: 400 TABLET ORAL at 20:11

## 2021-09-19 RX ADMIN — GABAPENTIN 400 MG: 400 CAPSULE ORAL at 08:06

## 2021-09-19 RX ADMIN — SODIUM CHLORIDE, PRESERVATIVE FREE 10 ML: 5 INJECTION INTRAVENOUS at 20:11

## 2021-09-19 RX ADMIN — GABAPENTIN 400 MG: 400 CAPSULE ORAL at 14:15

## 2021-09-19 RX ADMIN — PANTOPRAZOLE SODIUM 40 MG: 40 TABLET, DELAYED RELEASE ORAL at 06:25

## 2021-09-19 RX ADMIN — HYDROCODONE BITARTRATE AND ACETAMINOPHEN 1 TABLET: 5; 325 TABLET ORAL at 08:06

## 2021-09-19 RX ADMIN — MIRTAZAPINE 30 MG: 15 TABLET, FILM COATED ORAL at 20:10

## 2021-09-19 RX ADMIN — METFORMIN HYDROCHLORIDE 1000 MG: 500 TABLET, FILM COATED ORAL at 07:57

## 2021-09-19 RX ADMIN — SODIUM CHLORIDE, SODIUM LACTATE, POTASSIUM CHLORIDE, AND CALCIUM CHLORIDE: 600; 310; 30; 20 INJECTION, SOLUTION INTRAVENOUS at 03:53

## 2021-09-19 RX ADMIN — DONEPEZIL HYDROCHLORIDE 10 MG: 5 TABLET, FILM COATED ORAL at 20:11

## 2021-09-19 RX ADMIN — IBUPROFEN 600 MG: 400 TABLET ORAL at 07:58

## 2021-09-19 ASSESSMENT — ENCOUNTER SYMPTOMS
GASTROINTESTINAL NEGATIVE: 1
ABDOMINAL PAIN: 1
CHEST TIGHTNESS: 0
EYES NEGATIVE: 1
SHORTNESS OF BREATH: 0

## 2021-09-19 ASSESSMENT — PAIN DESCRIPTION - LOCATION: LOCATION: ABDOMEN

## 2021-09-19 ASSESSMENT — PAIN DESCRIPTION - ORIENTATION: ORIENTATION: LOWER

## 2021-09-19 ASSESSMENT — PAIN SCALES - GENERAL
PAINLEVEL_OUTOF10: 3
PAINLEVEL_OUTOF10: 7
PAINLEVEL_OUTOF10: 2

## 2021-09-19 ASSESSMENT — PAIN DESCRIPTION - DESCRIPTORS: DESCRIPTORS: ACHING;DISCOMFORT

## 2021-09-19 ASSESSMENT — PAIN DESCRIPTION - PAIN TYPE: TYPE: ACUTE PAIN;SURGICAL PAIN

## 2021-09-19 NOTE — PROGRESS NOTES
6.0 09/17/2021    WBCUA 6-9 09/17/2021    RBCUA 3-5 09/17/2021    YEAST 0 08/24/2021    BACTERIA 1+ 09/17/2021    CLARITYU Clear 09/17/2021    SPECGRAV 1.005 09/17/2021    LEUKOCYTESUR MODERATE 09/17/2021    UROBILINOGEN 0.2 09/17/2021    BILIRUBINUR Negative 09/17/2021    BLOODU LARGE 09/17/2021    GLUCOSEU Negative 09/17/2021       ASSESSMENT AND PLAN    Patient Active Problem List   Diagnosis    Benign prostatic hyperplasia with urinary retention    Bladder neck contracture    Urinary retention    Hypotension     1.  Hypotension without tachycardia. Resolved.     2.  Bladder neck contracture.  S/P incision bladder neck contracture. Urethral granger draining clear, yellow urine. SPT in place and plugged. He will need to keep the urethral catheter until he follows up with us in our office in about one week. We will keep the APT in place for draining his bladder or measuring residual in case he is unable to void after his voiding trail in the office. Plan for DC in the morning. Patient stated he would like to stay one more night.     BRYAN Zazueta - CNP  9/19/2021 4:41 PM

## 2021-09-19 NOTE — CONSULTS
[facility]      Patient:  Blaze Oneil  YOB: 1934  Date of Service: 9/19/2021  MRN: [id number]   Acct: [de-identified]   Primary Care Physician: Titi Rockwell  Advance Directive: Full Code  Admit Date: 9/16/2021       Hospital Day: 2  Portions of this note have been copied forward, however, changed to reflect the most current clinical status of this patient. CHIEF COMPLAINT Low blood pressure    SUBJECTIVE: I feel better. No new complaints today    [facility]. S/P cystoscopy with suprapubic catheter exchange with transurethral incision of bladder neck contracture on 9/16/201. He was monitored in the ICU and given supportive care with vasopressors. He responded well. He remained afebrile. His sodium today is 139 potassium 4.1. WBC normal at 8.3       Review of Systems:   Review of Systems   Constitutional: Negative for fatigue and fever. HENT: Negative. Eyes: Negative. Respiratory: Negative for shortness of breath. Cardiovascular: Negative for chest pain, palpitations and leg swelling. Gastrointestinal: Negative. Genitourinary: Positive for difficulty urinating and dysuria. Negative for decreased urine volume. Suprapubic cath I place   Musculoskeletal: Negative. Neurological: Negative. Psychiatric/Behavioral: Negative. 14 point review of systems is negative except as specifically addressed above. Objective:   VITALS:  /73   Pulse 63   Temp 97.2 °F (36.2 °C)   Resp 18   Ht 5' 7\" (1.702 m)   Wt 140 lb (63.5 kg)   SpO2 97%   BMI 21.93 kg/m²   24HR INTAKE/OUTPUT:    Intake/Output Summary (Last 24 hours) at 9/19/2021 1545  Last data filed at 9/19/2021 1526  Gross per 24 hour   Intake 3459 ml   Output 2400 ml   Net 1059 ml       Physical Exam  Vitals and nursing note reviewed. Constitutional:       Appearance: Normal appearance. He is normal weight. HENT:      Head: Normocephalic and atraumatic.       Nose: Nose normal.      Mouth/Throat:      Mouth: Mucous membranes are moist.   Cardiovascular:      Rate and Rhythm: Normal rate and regular rhythm. Heart sounds: Normal heart sounds. Pulmonary:      Effort: Pulmonary effort is normal.   Abdominal:      Palpations: Abdomen is soft. Comments: Suprapubic cath in place   Musculoskeletal:         General: No deformity or signs of injury. Cervical back: Normal range of motion. Skin:     General: Skin is warm and dry. Neurological:      General: No focal deficit present. Mental Status: He is alert. Psychiatric:         Mood and Affect: Mood normal.             Medications:      lactated ringers 100 mL/hr at 09/19/21 0353    sodium chloride        metFORMIN  1,000 mg Oral BID WC    pantoprazole  40 mg Oral QAM AC    gabapentin  400 mg Oral TID    mirtazapine  30 mg Oral Nightly    atorvastatin  20 mg Oral Daily    [Held by provider] verapamil  120 mg Oral BID    donepezil  10 mg Oral Nightly    ibuprofen  600 mg Oral BID    sodium chloride flush  5-40 mL IntraVENous 2 times per day    ciprofloxacin  400 mg IntraVENous Q12H     guaiFENesin, melatonin, acetaminophen, sodium chloride flush, sodium chloride, polyethylene glycol, ondansetron **OR** ondansetron, HYDROcodone 5 mg - acetaminophen **OR** HYDROcodone 5 mg - acetaminophen, morphine **OR** morphine  ADULT DIET;  Regular; 4 carb choices (60 gm/meal)     Lab and other Data:     Recent Labs     09/17/21  1321 09/18/21  0146 09/19/21  0317   WBC 18.0* 17.2* 8.3   HGB 10.4* 11.6* 10.9*    190 171     Recent Labs     09/17/21  0543 09/18/21  0146 09/19/21  0317   * 142 139   K 3.8 3.6 4.1   CL 99 109 108   CO2 28 22 24   BUN 16 13 11   CREATININE 1.1 0.7 0.6   GLUCOSE 142* 133* 114*     Recent Labs     09/18/21  0146 09/19/21  0317   AST 26 19   ALT 16 14   BILITOT <0.2 <0.2   ALKPHOS 87 87     Troponin T:   Recent Labs     09/17/21  1321   TROPONINI <0.01     Pro-BNP: No results for input(s): BNP in the last 72 hours. INR: No results for input(s): INR in the last 72 hours. UA:  Recent Labs     09/17/21  1715   COLORU YELLOW   PHUR 6.0   WBCUA 6-9   RBCUA 3-5*   BACTERIA 1+*   CLARITYU Clear   SPECGRAV 1.005   LEUKOCYTESUR MODERATE*   UROBILINOGEN 0.2   BILIRUBINUR Negative   BLOODU LARGE*   GLUCOSEU Negative     A1C: No results for input(s): LABA1C in the last 72 hours. ABG:No results for input(s): PHART, FOU9XZB, PO2ART, XUS2DKM, BEART, HGBAE, W9YLYQIU, CARBOXHGBART in the last 72 hours. RAD:   No results found. Assessment/Plan   Active Problems:    Bladder neck contracture    Urinary retention   Continue rx for bladder infection   moniter I/O   Follow with urology     Hypotension   Resolved  Resolved Problems:    * No resolved hospital problems.  *      Antibiotic: cipro    DVT Prophylaxis:SCD    GI prophylaxis:  prilosec    BRYAN Sutton - CNP, 9/19/2021 3:45 PM

## 2021-09-20 PROBLEM — Z51.5 PALLIATIVE CARE PATIENT: Status: ACTIVE | Noted: 2021-09-20

## 2021-09-20 LAB
GLUCOSE BLD-MCNC: 117 MG/DL (ref 70–99)
GLUCOSE BLD-MCNC: 99 MG/DL (ref 70–99)
ORGANISM: ABNORMAL
PERFORMED ON: ABNORMAL
PERFORMED ON: NORMAL
URINE CULTURE, ROUTINE: ABNORMAL
URINE CULTURE, ROUTINE: ABNORMAL

## 2021-09-20 PROCEDURE — 2580000003 HC RX 258: Performed by: UROLOGY

## 2021-09-20 PROCEDURE — 97165 OT EVAL LOW COMPLEX 30 MIN: CPT

## 2021-09-20 PROCEDURE — 99024 POSTOP FOLLOW-UP VISIT: CPT | Performed by: NURSE PRACTITIONER

## 2021-09-20 PROCEDURE — 2580000003 HC RX 258: Performed by: INTERNAL MEDICINE

## 2021-09-20 PROCEDURE — 97116 GAIT TRAINING THERAPY: CPT

## 2021-09-20 PROCEDURE — 6360000002 HC RX W HCPCS: Performed by: UROLOGY

## 2021-09-20 PROCEDURE — 82947 ASSAY GLUCOSE BLOOD QUANT: CPT

## 2021-09-20 PROCEDURE — 1210000000 HC MED SURG R&B

## 2021-09-20 PROCEDURE — 6370000000 HC RX 637 (ALT 250 FOR IP): Performed by: UROLOGY

## 2021-09-20 PROCEDURE — 97161 PT EVAL LOW COMPLEX 20 MIN: CPT

## 2021-09-20 RX ORDER — CIPROFLOXACIN 500 MG/1
500 TABLET, FILM COATED ORAL 2 TIMES DAILY
Qty: 14 TABLET | Refills: 0 | Status: SHIPPED | OUTPATIENT
Start: 2021-09-20 | End: 2021-09-27

## 2021-09-20 RX ORDER — CIPROFLOXACIN 500 MG/1
500 TABLET, FILM COATED ORAL EVERY 12 HOURS SCHEDULED
Status: DISCONTINUED | OUTPATIENT
Start: 2021-09-21 | End: 2021-09-21 | Stop reason: HOSPADM

## 2021-09-20 RX ADMIN — GABAPENTIN 400 MG: 400 CAPSULE ORAL at 08:26

## 2021-09-20 RX ADMIN — CIPROFLOXACIN 400 MG: 2 INJECTION INTRAVENOUS at 14:37

## 2021-09-20 RX ADMIN — IBUPROFEN 600 MG: 400 TABLET ORAL at 21:38

## 2021-09-20 RX ADMIN — SODIUM CHLORIDE, PRESERVATIVE FREE 10 ML: 5 INJECTION INTRAVENOUS at 08:26

## 2021-09-20 RX ADMIN — GABAPENTIN 400 MG: 400 CAPSULE ORAL at 21:39

## 2021-09-20 RX ADMIN — PANTOPRAZOLE SODIUM 40 MG: 40 TABLET, DELAYED RELEASE ORAL at 08:28

## 2021-09-20 RX ADMIN — CIPROFLOXACIN 400 MG: 2 INJECTION INTRAVENOUS at 02:36

## 2021-09-20 RX ADMIN — METFORMIN HYDROCHLORIDE 1000 MG: 500 TABLET, FILM COATED ORAL at 18:09

## 2021-09-20 RX ADMIN — MIRTAZAPINE 30 MG: 15 TABLET, FILM COATED ORAL at 21:39

## 2021-09-20 RX ADMIN — GABAPENTIN 400 MG: 400 CAPSULE ORAL at 14:40

## 2021-09-20 RX ADMIN — IBUPROFEN 600 MG: 400 TABLET ORAL at 08:24

## 2021-09-20 RX ADMIN — DONEPEZIL HYDROCHLORIDE 10 MG: 5 TABLET, FILM COATED ORAL at 21:38

## 2021-09-20 RX ADMIN — SODIUM CHLORIDE, SODIUM LACTATE, POTASSIUM CHLORIDE, AND CALCIUM CHLORIDE: 600; 310; 30; 20 INJECTION, SOLUTION INTRAVENOUS at 18:06

## 2021-09-20 RX ADMIN — METFORMIN HYDROCHLORIDE 1000 MG: 500 TABLET, FILM COATED ORAL at 08:24

## 2021-09-20 RX ADMIN — ATORVASTATIN CALCIUM 20 MG: 20 TABLET, FILM COATED ORAL at 08:26

## 2021-09-20 RX ADMIN — SODIUM CHLORIDE, SODIUM LACTATE, POTASSIUM CHLORIDE, AND CALCIUM CHLORIDE: 600; 310; 30; 20 INJECTION, SOLUTION INTRAVENOUS at 02:36

## 2021-09-20 ASSESSMENT — PAIN SCALES - GENERAL
PAINLEVEL_OUTOF10: 3
PAINLEVEL_OUTOF10: 2
PAINLEVEL_OUTOF10: 0

## 2021-09-20 ASSESSMENT — PAIN - FUNCTIONAL ASSESSMENT: PAIN_FUNCTIONAL_ASSESSMENT: ACTIVITIES ARE NOT PREVENTED

## 2021-09-20 ASSESSMENT — PAIN DESCRIPTION - ONSET: ONSET: ON-GOING

## 2021-09-20 ASSESSMENT — PAIN DESCRIPTION - LOCATION: LOCATION: ABDOMEN

## 2021-09-20 ASSESSMENT — ENCOUNTER SYMPTOMS
ALLERGIC/IMMUNOLOGIC NEGATIVE: 1
ABDOMINAL PAIN: 0
EYES NEGATIVE: 1
RESPIRATORY NEGATIVE: 1

## 2021-09-20 ASSESSMENT — PAIN DESCRIPTION - DIRECTION: RADIATING_TOWARDS: NO

## 2021-09-20 ASSESSMENT — PAIN DESCRIPTION - ORIENTATION: ORIENTATION: LOWER

## 2021-09-20 ASSESSMENT — PAIN DESCRIPTION - FREQUENCY: FREQUENCY: INTERMITTENT

## 2021-09-20 ASSESSMENT — PAIN DESCRIPTION - DESCRIPTORS: DESCRIPTORS: ACHING

## 2021-09-20 ASSESSMENT — PAIN DESCRIPTION - PAIN TYPE: TYPE: SURGICAL PAIN

## 2021-09-20 ASSESSMENT — PAIN DESCRIPTION - PROGRESSION: CLINICAL_PROGRESSION: NOT CHANGED

## 2021-09-20 NOTE — CARE COORDINATION
HH referral received. [facility] Call placed to [facility] servies [phone] [id number]. Spoke with Hitesh Reyes. Message left for nurse to return call regarding [facility].    Electronically signed by Binh López on 9/20/21 at 11:26 AM CDT

## 2021-09-20 NOTE — PROGRESS NOTES
Went to talk to patient about discharge and go over granger catheter instructions. Patient confused and saying random things he told me he wasn't 80 like I said he was and talking about a previous discharge where they didn't instruct him well and he didn't know how to care for himself at home. Patient goes on and states that he doesn't care about instructions he will just go home and turn the kerosene on, light a match and let his house burn down. I asked patient if he wanted to kill himself and he said yes I'm ok to die I'm [de-identified] years old and I was born in [birth year]. I told patient he was 80years old. I asked patient if he would ho to SNF where he could get the care he needed and he was agreeable at this time. Ganesh Lazar notify [employer] . BRYAN Gutierrez notified.

## 2021-09-20 NOTE — DISCHARGE INSTR - DIET

## 2021-09-20 NOTE — PROGRESS NOTES
Physical Therapy    Facility/Department: [facility] SURG SERVICES  Initial Assessment    NAME: Lucy Moore  : 1934  MRN: [id number]    Date of Service: 2021    Discharge Recommendations:  Continue to assess pending progress, Home with assist PRN        Assessment   Body structures, Functions, Activity limitations: Decreased functional mobility ; Decreased balance  Assessment: Pt REPORTEDLY WAS UNSTEADY WHEN AMB WITH NSG TO BR THIS AM, BUT HAD NOT BEEN OOB IN A FEW DAYS. Pt DID WELL AMB IN ROOM THEN IN HALLWAY WITHOUT AD. WILL BE SAFE TO RETURN HOME WITH ASSIST PRN. PT Education: PT Role;Plan of Care  REQUIRES PT FOLLOW UP: Yes  Activity Tolerance  Activity Tolerance: Patient Tolerated treatment well       Patient Diagnosis(es): There were no encounter diagnoses. has a past medical history of Arthritis, Bladder neck contracture, Diabetes mellitus ([facility] [address] History of blood transfusion, Hyperlipidemia, Hypertension, Immunization due, Neuropathy, and Palliative care patient. has a past surgical history that includes Nose surgery; Colonoscopy; Endoscopy, colon, diagnostic; eye surgery (Bilateral); skin biopsy; Tonsillectomy; TURP (N/A, 2021); Cystoscopy (N/A, 2021); and Cystoscopy (N/A, 2021).     Restrictions     Vision/Hearing  Vision: Within Functional Limits  Hearing: Within functional limits     Subjective  General  Patient assessed for rehabilitation services?: Yes  Diagnosis: BLADDER NECK CONTRACTURE  Subjective  Subjective: Pt VERY PLEASANT, READY TO GO HOME  Pain Screening  Patient Currently in Pain: No  Vital Signs  Patient Currently in Pain: No       Orientation  Orientation  Overall Orientation Status: Within Normal Limits  Social/Functional History  Social/Functional History  Lives With: Alone  Type of Home: House  Home Layout: One level  Home Access: Ramped entrance, Stairs to enter with rails  Entrance Stairs - Number of Steps: 3  ADL Assistance: Independent  Ambulation Assistance: Independent  Transfer Assistance: Independent  Additional Comments: HAS NEIGHBOR WHO ASSISTS AS NEEDED  Cognition   Cognition  Overall Cognitive Status: WNL    Objective          AROM RLE (degrees)  RLE AROM: WFL  AROM LLE (degrees)  LLE AROM : WFL  Strength RLE  Comment: GROSSLY +4/5  Strength LLE  Comment: GROSSLY +4/5        Bed mobility  Supine to Sit: Independent  Sit to Supine: Independent  Transfers  Sit to Stand: Stand by assistance  Stand to sit: Stand by assistance  Ambulation 1  Device: No Device (HHA initial 20' then no ad)  Assistance: Contact guard assistance;Stand by assistance  Quality of Gait: no LOB, moderate pace  Gait Deviations: Decreased step length  Distance: 120'     Balance  Sitting - Dynamic: Good  Standing - Dynamic: Good;-        Plan   Plan  Times per week: AT LEAST 5-6  Current Treatment Recommendations: Strengthening, Balance Training, Functional Mobility Training, Gait Training, Safety Education & Training, Patient/Caregiver Education & Training, Transfer Training  Safety Devices  Type of devices: Bed alarm in place, Call light within reach    G-Code       OutComes Score                                                  AM-PAC Score             Goals  Short term goals  Time Frame for Short term goals: 14 days  Short term goal 1: TRANSFERS INDEPENDENT  Short term goal 2: ' SUP-IND       Therapy Time   Individual Concurrent Group Co-treatment   Time In           Time Out           Minutes                   Laura Rincon, PT

## 2021-09-20 NOTE — PROGRESS NOTES
Patient called out to use the bathroom, upon entering room patient started saying I want you to know I'm not a well care patient, I guess next time I need to go to the  Hospital I'll just forget it and go to the  home instead, I need to just die.

## 2021-09-20 NOTE — PROGRESS NOTES
Palliative Care/Spiritual Care: Met with pt to initiate palliative care. Pt had bladder neck contracture, according to Nurse practitioner note. Pt has a history of DM also. Advance Directives: Pt says he has an AD/LW and has his two decision makers listed. Pt is a full code and wants CPR and Ventilator. SEE ACP NOTE. Pain/other symptoms: Pt did not complain of pain. Social/Spiritual: Pt says he attends [employer]. Pt/family discussion r/t goals: Pt has no children and has neighbors and friends listed as decision makers. Pt says he lives at home alone and ambulates without assistance most of the time. Pt is returning home with home health to previous quality of life and previous living skills, when discharged. Provided spiritual care with sustaining presence, nurtured hope, and prayer. Pt expressed gratitude for spiritual care.     Electronically signed by Mariia Peterson on 9/20/2021 at 12:57 PM

## 2021-09-20 NOTE — ACP (ADVANCE CARE PLANNING)
Advance Care Planning     Advance Care Planning Activator (Inpatient)  Conversation Note      Date of ACP Conversation: 9/20/2021     Conversation Conducted with:     ACP Activator: Akua Godfrey      Current Designated Health Care Decision Maker: Patient has a decision maker listed and says he has a LW; this  requested a copy. Care Preferences    Ventilation: \"If you were in your present state of health and suddenly became very ill and were unable to breathe on your own, what would your preference be about the use of a ventilator (breathing machine) if it were available to you? \"      Would the patient desire the use of ventilator (breathing machine)?: Yes    \"If your health worsens and it becomes clear that your chance of recovery is unlikely, what would your preference be about the use of a ventilator (breathing machine) if it were available to you? \"     Would the patient desire the use of ventilator (breathing machine)?: Yes      Resuscitation  \"CPR works best to restart the heart when there is a sudden event, like a heart attack, in someone who is otherwise healthy. Unfortunately, CPR does not typically restart the heart for people who have serious health conditions or who are very sick. \"    \"In the event your heart stopped as a result of an underlying serious health condition, would you want attempts to be made to restart your heart (answer \"yes\" for attempt to resuscitate) or would you prefer a natural death (answer \"no\" for do not attempt to resuscitate)? \" Yes       [] Yes   [x] No   Educated Patient / Jayshree Silvestre regarding differences between Advance Directives and portable DNR orders. Conversation Outcomes:  [x] ACP discussion completed  [x] Existing advance directive reviewed with patient; no changes to patient's previously recorded wishes     This  requested a copy of pt's AD/LW.     Electronically signed by Akua Godfrey on 9/20/2021 at 12:59 PM

## 2021-09-20 NOTE — PROGRESS NOTES
Occupational Therapy   Occupational Therapy Initial Assessment  Date: 2021   Patient Name: Felice Henao  MRN: [id number]     : 1934    Date of Service: 2021    Discharge Recommendations:          Assessment   Assessment: Pt. did well with ADL and tfers without AD. OT eval only  Treatment Diagnosis: Bladder neck contracture  Prognosis: Good  Decision Making: Low Complexity  REQUIRES OT FOLLOW UP: No  Activity Tolerance  Activity Tolerance: Patient Tolerated treatment well           Patient Diagnosis(es): There were no encounter diagnoses. has a past medical history of Arthritis, Bladder neck contracture, Diabetes mellitus ([facility] [address] History of blood transfusion, Hyperlipidemia, Hypertension, Immunization due, Neuropathy, and Palliative care patient. has a past surgical history that includes Nose surgery; Colonoscopy; Endoscopy, colon, diagnostic; eye surgery (Bilateral); skin biopsy; Tonsillectomy; TURP (N/A, 2021); Cystoscopy (N/A, 2021); and Cystoscopy (N/A, 2021).     Treatment Diagnosis: Bladder neck contracture      Restrictions       Subjective   General  Chart Reviewed: Yes  Patient assessed for rehabilitation services?: Yes  Family / Caregiver Present: No  Diagnosis: Bladder neck contracture  Patient Currently in Pain: No  Vital Signs  Temp: 97.3 °F (36.3 °C)  Temp Source: Temporal  Pulse: 79  Heart Rate Source: Monitor  Resp: 18  BP: 123/72  BP Location: Left upper arm  MAP (mmHg): 82  Patient Currently in Pain: No  Oxygen Therapy  SpO2: 96 %  O2 Device: None (Room air)  Social/Functional History  Social/Functional History  Lives With: Alone  Type of Home: House  Home Layout: One level  Home Access: Ramped entrance, Stairs to enter with rails  Entrance Stairs - Number of Steps: 3  ADL Assistance: Independent  Ambulation Assistance: Independent  Transfer Assistance: Independent  Additional Comments: HAS NEIGHBOR WHO ASSISTS AS NEEDED       Objective   Vision: Within Functional Limits  Hearing: Within functional limits    Orientation  Overall Orientation Status: Within Normal Limits        ADL  Feeding: Independent  Grooming: Independent  UE Bathing: Independent  LE Bathing: Independent  UE Dressing: Independent  LE Dressing: Independent  Toileting: Independent           Transfers  Stand Step Transfers: Independent  Sit to stand: Independent     Cognition  Overall Cognitive Status: WNL                 LUE AROM (degrees)  LUE AROM : WFL  RUE AROM (degrees)  RUE AROM : WFL  LUE Strength  Gross LUE Strength: WNL  RUE Strength  Gross RUE Strength:  WNL                   Plan   Plan  Times per week: OT eval only    G-Code     OutComes Score                                                  AM-PAC Score             Goals  Short term goals  Time Frame for Short term goals: OT eval only  Long term goals  Time Frame for Long term goals : OT eval only       Therapy Time   Individual Concurrent Group Co-treatment   Time In           Time Out           Minutes                   ALPHONSE Toney/KYLAH

## 2021-09-20 NOTE — DISCHARGE SUMMARY
Discharge Summary    Date:9/21/2021        Patient Sherryle Grandchild     YOB: 1934     Age:87 y.o. Admit Date:9/16/2021   Admission Condition:good   Discharged Condition:stable  Discharge Date: 09/21/21     Discharge Diagnoses   Active Problems:    Bladder neck contracture    Urinary retention    Hypotension    Palliative care patient  Resolved Problems:    * No resolved hospital problems. [facility]   Narrative of Hospital Course:     Patient presented to our facility after undergoing TURP for BPH and urinary retention on 4/14/2021. On 8/24/2021 he was seen in our office with the inability to void. A catheter was also unable to be placed. A cystoscopy performed in the office by Dr. Carlos Jarvis revealed complete occlusion of the bladder neck consistent with a bladder neck contracture so he was taken to the operating room on that same day where suprapubic catheter was placed. He was taken to surgery on 9/16/21 by Dr. Carlos Jarvis in the hopes to reestablish a urethral tract by performing a bladder neck incision. Dr. Carlos Jarvis was indeed able to perform a bladder neck incision and placed a urethral catheter in addition to his suprapubic catheter. We had intended to keep him overnight for observation. Postop day #1, patient having severe hypotension without significant systemic effects. He remained conscious, was not lethargic, was not confused. He did complain of some left arm and right arm numbness. His white count had also risen significantly overnight though he was afebrile. He was given 2 fluid boluses and had little to no response to those. At that point, the hospitalist team was consulted and he was moved to the ICU. He remained there overnight where his blood pressure became stable on pressors. Sepsis bundle was initiated. We checked his troponin, and EKG, lactic acid and blood cultures.   Though his lactic acid was elevated, blood and urine cultures to date reveal no growth. Postop day #2, he was weaned off pressors and he was normotensive. He was moved from the ICU back to the floor. On postop day #3, I gave him the option to return home, however, the patient reports he felt weak and wished to stay 1 more night. Postop day #4, today, the patient is doing well and ready to proceed home. We will plan for follow-up in a week to remove his urethral catheter. The SP tube will remain in place even after removal of the urethral catheter to aid in monitoring residuals or emptying her bladder should the patient have a recurrence of his contracture or difficulty voiding. This plan has been discussed with the patient and his nurse. He voices understanding. **On postop day 4 when the nurse explained to the patient he would be discharged home, just as I had 1-2 hours prior, he told her that he would not be able to go home by himself because he was too weak. After he requested to be set up with a  for determination of skilled nursing facility versus home health, he made comments about burning down his house and killing himself. At that time, his discharge was canceled and a psychiatric consult was ordered. Postop day #5, the patient has been cleared by psychiatry and is now ready to proceed home without the help of home health and such. We will follow-up as originally planned. Consultants:   IP CONSULT TO HOSPITALIST  PALLIATIVE CARE EVAL  IP CONSULT TO SOCIAL WORK  IP CONSULT TO HOME CARE NEEDS  IP CONSULT TO PSYCHIATRY    Time Spent on Discharge:  45 minutes were spent in patient examination, evaluation, counseling as well as medication reconciliation, prescriptions for required medications, discharge plan and follow up.       Surgeries/Procedures Performed:  Procedure(s):  CYSTOSCOPY TRANSURETHRAL inison BLADDER Bladder neck contracture ; exchange supra pubic catheter  9/16/21 with Dr Queen Shaw    Treatments:   IV hydration, antibiotics: Cipro, analgesia: acetaminophen and ibuprofen and surgery: see above    Significant Diagnostic Studies:   Recent Labs:  CBC:   Lab Results   Component Value Date    WBC 8.3 09/19/2021    RBC 3.76 09/19/2021    HGB 10.9 09/19/2021    HCT 34.8 09/19/2021    MCV 92.6 09/19/2021    MCH 29.0 09/19/2021    MCHC 31.3 09/19/2021    RDW 14.1 09/19/2021     09/19/2021     BMP:    Lab Results   Component Value Date    GLUCOSE 114 09/19/2021     09/19/2021    K 4.1 09/19/2021    K 3.8 09/17/2021     09/19/2021    CO2 24 09/19/2021    ANIONGAP 7 09/19/2021    BUN 11 09/19/2021    CREATININE 0.6 09/19/2021    CALCIUM 8.3 09/19/2021    LABGLOM >60 09/19/2021    GFRAA >59 09/19/2021       Radiology Last 7 Days:  No results found. Discharge Plan   Disposition: Home    Provider Follow-Up:   [facility]  [address] [facility]  [phone]      3-5 days after release. Home based primary care. Fernandez Velasco already been notified - will notify patient at home. (# is [phone])    Anabel Valadez, APRN - CNP  [id number] [phone]    On 9/28/2021   at 8:30am for removal of urethral catheter       Hospital/Incidental Findings Requiring Follow-Up:  Hypotension: FU with PCP this week    Patient Instructions   Diet: regular diet    [id number] [facility], Dr Jose Miguel Gallego    Cystoscopy / Daun Ort / Bladder Endoscopy Procedures Discharge Instructions    You may experience : Burning sensation when you void     A feeling of a need to go to the bathroom frequently     You may have urgent urination     Your urine may be blood tinged    These symptoms should be relieved within a few hours and days  as you increase the amounts of fluids you drink and the number of times that you empty your bladder. They may persist for 1-2 weeks if you have a stent or had a biopsy or resection. We recommended that you drink plenty of fluid a few days after surgery.     No strenuous activities for 1 week or  until you talk to your doctor. You may feel light headed up to 24 hours after anesthesia. You should not do the following for the next 24 hours. Drive a car, operate machinery or power tools     Drink any alcoholic drinks (not even beer or wine)     Make any important decisions, i.e., signing important papers. It is recommended that you begin with clear liquids and/or light foods. If you  Are not nauseated, progress to your normal diet. I you are unable to urinate you should call your surgeon.     Dr. Jarrett Donohue    Discharge Medications         Medication List      START taking these medications    ciprofloxacin 500 MG tablet  Commonly known as: CIPRO  Take 1 tablet by mouth 2 times daily for 7 days        CONTINUE taking these medications    acetaminophen 500 MG tablet  Commonly known as: TYLENOL  Take 2 tablets by mouth 4 times daily as needed for Pain     atorvastatin 20 MG tablet  Commonly known as: LIPITOR     diclofenac sodium 1 % Gel  Commonly known as: VOLTAREN     donepezil 10 MG tablet  Commonly known as: ARICEPT     gabapentin 400 MG capsule  Commonly known as: NEURONTIN     guaiFENesin 400 MG tablet     ibuprofen 600 MG tablet  Commonly known as: ADVIL;MOTRIN  Take 1 tablet by mouth 2 times daily HOLD THIS MEDICATION FOR AT LEAST 5 DAYS     indomethacin 25 MG capsule  Commonly known as: INDOCIN     melatonin 3 MG Tabs tablet     metFORMIN 1000 MG tablet  Commonly known as: GLUCOPHAGE     mirtazapine 30 MG tablet  Commonly known as: REMERON     omeprazole 20 MG delayed release capsule  Commonly known as: PRILOSEC     verapamil 120 MG tablet  Commonly known as: CALAN           Where to Get Your Medications      These medications were sent to "[facility] [address] [place] [address]  [address], [address]    Phone: [phone]   · ciprofloxacin 500 MG tablet         Electronically signed by BRYAN Reyes CNP on 9/20/21 at 8:15 AM [place]

## 2021-09-20 NOTE — PROGRESS NOTES
[facility]      Patient:  Swapnil Mcdonald  YOB: 1934  Date of Service: 9/20/2021  MRN: [id number]   Acct: [de-identified]   Primary Care Physician: Margarette Fink  Advance Directive: Full Code  Admit Date: 9/16/2021       Hospital Day: 3  Portions of this note have been copied forward, however, changed to reflect the most current clinical status of this patient. CHIEF COMPLAINT Hypotension    SUBJECTIVE:  \"I want to go home\"    Ricco Ceron presented to our facility after undergoing TURP for BPH and urinary retention on 4/14/2021. On 8/24/2021 he was seen in our office with the inability to void. A catheter was also unable to be placed. A cystoscopy performed in the office by Dr. Gwen Daly revealed complete occlusion of the bladder neck consistent with a bladder neck contracture so he was taken to the operating room on that same day where suprapubic catheter was placed. He was taken to surgery on 9/16/21 by Dr. Gwen Daly in the hopes to reestablish a urethral tract by performing a bladder neck incision. Dr. Gwen Daly was indeed able to perform a bladder neck incision and placed a urethral catheter in addition to his suprapubic catheter. We had intended to keep him overnight for observation.     Postop day #1, patient having severe hypotension without significant systemic effects. He remained conscious, was not lethargic, was not confused. He did complain of some left arm and right arm numbness. His white count had also risen significantly overnight though he was afebrile. He was given 2 fluid boluses and had little to no response to those. At that point, the hospitalist team was consulted and he was moved to the ICU. He remained there overnight where his blood pressure became stable on pressors. Sepsis bundle was initiated. We checked his troponin, and EKG, lactic acid and blood cultures.   Though his lactic acid was elevated, blood and urine cultures to date reveal no growth.     Postop day #2, he was weaned off pressors and he was normotensive. He was moved from the ICU back to the floor. On postop day #3, I gave him the option to return home, however, the patient reports he felt weak and wished to stay 1 more night. Postop day #4, today, the patient is doing well and ready to proceed home. We will plan for follow-up in a week to remove his urethral catheter. The SP tube will remain in place even after removal of the urethral catheter to aid in monitoring residuals or emptying her bladder should the patient have a recurrence of his contracture or difficulty voiding. This plan has been discussed with the patient and his nurse. He voices understanding. Agree with urology that he is good to go home. Normal b/p/  Will follow up with PCP in one week  Review of Systems:   Review of Systems   Constitutional: Negative. HENT: Negative. Eyes: Negative. Respiratory: Negative. Cardiovascular: Negative. Gastrointestinal: Negative for abdominal pain. Genitourinary: Negative for difficulty urinating, hematuria and urgency. Musculoskeletal: Negative. Allergic/Immunologic: Negative. Neurological: Negative. Hematological: Negative. Psychiatric/Behavioral: Negative. 14 point review of systems is negative except as specifically addressed above. Objective:   VITALS:  BP (!) 141/86   Pulse 57   Temp 97 °F (36.1 °C) (Temporal)   Resp 14   Ht 5' 7\" (1.702 m)   Wt 140 lb (63.5 kg)   SpO2 96%   BMI 21.93 kg/m²   24HR INTAKE/OUTPUT:    Intake/Output Summary (Last 24 hours) at 9/20/2021 1101  Last data filed at 9/20/2021 0953  Gross per 24 hour   Intake 2294 ml   Output 3900 ml   Net -1606 ml       Physical Exam  Vitals and nursing note reviewed. Constitutional:       General: He is not in acute distress. Appearance: Normal appearance. He is not ill-appearing. HENT:      Head: Normocephalic.       Mouth/Throat:      Mouth: Mucous membranes are moist.   Cardiovascular:      Rate and Rhythm: Normal rate and regular rhythm. Heart sounds: Normal heart sounds. Pulmonary:      Effort: Pulmonary effort is normal.      Breath sounds: Normal breath sounds. Abdominal:      General: Bowel sounds are normal. There is no distension. Palpations: Abdomen is soft. Genitourinary:     Comments: Sp cath in place with no sign of infection  Musculoskeletal:         General: Normal range of motion. Cervical back: Normal range of motion and neck supple. Skin:     General: Skin is warm and dry. Neurological:      General: No focal deficit present. Mental Status: He is alert. Psychiatric:         Mood and Affect: Mood normal.             Medications:      lactated ringers 100 mL/hr at 09/20/21 0236    sodium chloride        metFORMIN  1,000 mg Oral BID WC    pantoprazole  40 mg Oral QAM AC    gabapentin  400 mg Oral TID    mirtazapine  30 mg Oral Nightly    atorvastatin  20 mg Oral Daily    [Held by provider] verapamil  120 mg Oral BID    donepezil  10 mg Oral Nightly    ibuprofen  600 mg Oral BID    sodium chloride flush  5-40 mL IntraVENous 2 times per day    ciprofloxacin  400 mg IntraVENous Q12H     guaiFENesin, melatonin, acetaminophen, sodium chloride flush, sodium chloride, polyethylene glycol, ondansetron **OR** ondansetron, HYDROcodone 5 mg - acetaminophen **OR** HYDROcodone 5 mg - acetaminophen, morphine **OR** morphine  ADULT DIET;  Regular; 4 carb choices (60 gm/meal)     Lab and other Data:     Recent Labs     09/17/21  1321 09/18/21  0146 09/19/21 0317   WBC 18.0* 17.2* 8.3   HGB 10.4* 11.6* 10.9*    190 171     Recent Labs     09/18/21  0146 09/19/21 0317    139   K 3.6 4.1    108   CO2 22 24   BUN 13 11   CREATININE 0.7 0.6   GLUCOSE 133* 114*     Recent Labs     09/18/21  0146 09/19/21 0317   AST 26 19   ALT 16 14   BILITOT <0.2 <0.2   ALKPHOS 87 87     Troponin T:   Recent Labs 09/17/21  1321   TROPONINI <0.01     Pro-BNP: No results for input(s): BNP in the last 72 hours. INR: No results for input(s): INR in the last 72 hours. UA:  Recent Labs     09/17/21  1715   COLORU YELLOW   PHUR 6.0   WBCUA 6-9   RBCUA 3-5*   BACTERIA 1+*   CLARITYU Clear   SPECGRAV 1.005   LEUKOCYTESUR MODERATE*   UROBILINOGEN 0.2   BILIRUBINUR Negative   BLOODU LARGE*   GLUCOSEU Negative     A1C: No results for input(s): LABA1C in the last 72 hours. ABG:No results for input(s): PHART, ZVZ6DFI, PO2ART, OUZ0QSP, BEART, HGBAE, G3CKCXDO, CARBOXHGBART in the last 72 hours. RAD:   No results found. Assessment/Plan   Active Problems:    Bladder neck contracture    Urinary retention   Resolved with surgical excision of stricture   SP cath in place to go home    Hypotension\   Resolved    Palliative care patient  Resolved Problems:    * No resolved hospital problems.  *      Antibiotic: cipro    DVT Prophylaxis:SCD    GI prophylaxis:  Protonix    BRYAN Esteban CNP, 9/20/2021 11:01 AM

## 2021-09-20 NOTE — CARE COORDINATION
NOLA met with Pt multiple times this morning regarding concerns for Pt going home without much support. Pt neighbor/friend told Pt that he could benefit from short term rehab at a facility such as [facility] since it is close to his home. SW discussed the benefits of going to a short term rehab facility. SW showed Pt on the map where [facility] was located. Pt stated he does not feel like he needs short term rehab. Pt feels like he ambulates with no issues and can cook his own meals. SW stated he is trying to keep Pt from having to come back to the hospital. Pt stated if he needed any follow up care then he would just go to the [facility]. NOLA spoke with the [facility] and he is part of their home based primary care services. Pt is 60% service connected and is not eligible for [facility] contracted short term rehab     UPDATE: 2:30 PM  Pt now has a psych consult and is not safe to DC home at this time. Potential needs for guardianship and short term rehab placement.      Electronically signed by Risa Swain on 9/20/2021 at 2:42 PM

## 2021-09-20 NOTE — PROGRESS NOTES
Pharmacy Intravenous to Oral Protocol    Medication changed per [facility] IV to PO protocol: Ciprofloxacin    Patient meets the following inclusion criteria and none of the exclusion criteria:    Inclusion criteria:  - IV therapy > 24 hours (antibiotics only)  - Tolerating diet more advanced than clear liquids  - Tolerating PO medications  - No vasopressor blood pressure support (ie no signs of shock)  - Patient hasn't had a seizure for 72 hrs (antiepileptic medications only)    Exclusion criteria:  - Infections requiring IV therapy (ie meningitis, endocarditis, osteomyelitis, pancreatitis)   - Nausea and/or vomiting or severe diarrhea within past 24 hours   - Has gastrectomy, ileus, gastric outlet or bowel obstruction, or malabsorption syndromes   - Has significant painful oral ulceration   - TPN with an NPO order   - Active GI bleed   - Unable to swallow   - NPO   - Febrile in the last 24 hours (antibiotics only)   - Clinical deteriorating or unstable (antibiotics only)   - Pediatric patients and patients who are not euthyroid (not on oral levothyroxine/not stabilized on oral levothyroxine)- Levothyroxine only     Electronically signed by Niko Hood, [address] [place] [address] on 9/20/2021 at 4:28 PM

## 2021-09-20 NOTE — PROGRESS NOTES
BRYAN Conner notified of patients suicidal thoughts. Psych evaluation ordered. Will continue to monitor patient. Patient is not safe to be discharged home alone. Patients needs SNF.

## 2021-09-20 NOTE — PROGRESS NOTES
Patient unsteady walking to the bathroom with assistance. Patient would benefit from a walker and physical therapy. He is a high risk for falls. Patient to be discharged home today. Patient does not have any family. Patient states his neighbor/friend Goran Tony helps him get to appointments and would give him a ride home. Message left with Goran Jimenez concerning discharge. Also, spoke with patients emergency contact Hailey Cali wife she states that Jen Brannon couldn't hear very well and she would have to answer questions for him. She stated that they do not live close to [place] but will try to help in anyway they could. Reached to  Sergio Pabon to help with patients discharge.

## 2021-09-21 VITALS
TEMPERATURE: 97.9 F | OXYGEN SATURATION: 96 % | BODY MASS INDEX: 22.35 KG/M2 | HEIGHT: 67 IN | HEART RATE: 54 BPM | DIASTOLIC BLOOD PRESSURE: 74 MMHG | SYSTOLIC BLOOD PRESSURE: 149 MMHG | RESPIRATION RATE: 18 BRPM | WEIGHT: 142.38 LBS

## 2021-09-21 LAB
GLUCOSE BLD-MCNC: 111 MG/DL (ref 70–99)
GLUCOSE BLD-MCNC: 91 MG/DL (ref 70–99)
PERFORMED ON: ABNORMAL
PERFORMED ON: NORMAL

## 2021-09-21 PROCEDURE — 82947 ASSAY GLUCOSE BLOOD QUANT: CPT

## 2021-09-21 PROCEDURE — 99024 POSTOP FOLLOW-UP VISIT: CPT | Performed by: NURSE PRACTITIONER

## 2021-09-21 PROCEDURE — 2580000003 HC RX 258: Performed by: INTERNAL MEDICINE

## 2021-09-21 PROCEDURE — 2580000003 HC RX 258: Performed by: UROLOGY

## 2021-09-21 PROCEDURE — 92523 SPEECH SOUND LANG COMPREHEN: CPT

## 2021-09-21 PROCEDURE — 6370000000 HC RX 637 (ALT 250 FOR IP): Performed by: UROLOGY

## 2021-09-21 PROCEDURE — 99221 1ST HOSP IP/OBS SF/LOW 40: CPT | Performed by: PSYCHIATRY & NEUROLOGY

## 2021-09-21 RX ADMIN — METFORMIN HYDROCHLORIDE 1000 MG: 500 TABLET, FILM COATED ORAL at 08:36

## 2021-09-21 RX ADMIN — SODIUM CHLORIDE, PRESERVATIVE FREE 10 ML: 5 INJECTION INTRAVENOUS at 08:37

## 2021-09-21 RX ADMIN — IBUPROFEN 600 MG: 400 TABLET ORAL at 08:37

## 2021-09-21 RX ADMIN — PANTOPRAZOLE SODIUM 40 MG: 40 TABLET, DELAYED RELEASE ORAL at 05:29

## 2021-09-21 RX ADMIN — CIPROFLOXACIN 500 MG: 500 TABLET, FILM COATED ORAL at 08:36

## 2021-09-21 RX ADMIN — GABAPENTIN 400 MG: 400 CAPSULE ORAL at 14:22

## 2021-09-21 RX ADMIN — ATORVASTATIN CALCIUM 20 MG: 20 TABLET, FILM COATED ORAL at 08:36

## 2021-09-21 RX ADMIN — GABAPENTIN 400 MG: 400 CAPSULE ORAL at 08:36

## 2021-09-21 RX ADMIN — SODIUM CHLORIDE, SODIUM LACTATE, POTASSIUM CHLORIDE, AND CALCIUM CHLORIDE: 600; 310; 30; 20 INJECTION, SOLUTION INTRAVENOUS at 18:42

## 2021-09-21 RX ADMIN — METFORMIN HYDROCHLORIDE 1000 MG: 500 TABLET, FILM COATED ORAL at 18:47

## 2021-09-21 ASSESSMENT — ENCOUNTER SYMPTOMS
CHEST TIGHTNESS: 0
GASTROINTESTINAL NEGATIVE: 1
EYES NEGATIVE: 1
RESPIRATORY NEGATIVE: 1
ALLERGIC/IMMUNOLOGIC NEGATIVE: 1
ABDOMINAL PAIN: 1
SHORTNESS OF BREATH: 0

## 2021-09-21 ASSESSMENT — PAIN SCALES - GENERAL
PAINLEVEL_OUTOF10: 0
PAINLEVEL_OUTOF10: 0

## 2021-09-21 NOTE — PROGRESS NOTES
Urology Progress Note    CC: \"Y'all are holding me hostage here. I just want to go home! \"    SUBJECTIVE:  Patient resting in bed with sitter at bedside. Agitated this morning. Wants to go home, but yesterday stated he was too weak to go home. Also made comments about burning his house down and how he \" just needs to die. \"  Discharge was canceled yesterday and psychiatric evaluation was ordered. OBJECTIVE:   Review of Systems   Constitutional: Negative for chills and fever. Respiratory: Negative for chest tightness and shortness of breath. Cardiovascular: Negative for chest pain, palpitations and leg swelling. Gastrointestinal: Positive for abdominal pain (incisional). Genitourinary: Negative for decreased urine volume and hematuria. Physical  VITALS:  BP (!) 167/82   Pulse 70   Temp 98.2 °F (36.8 °C) (Temporal)   Resp 18   Ht 5' 7\" (1.702 m)   Wt 140 lb (63.5 kg)   SpO2 97%   BMI 21.93 kg/m²   TEMPERATURE:  Current - Temp: 98.2 °F (36.8 °C); Max - Temp  Av.2 °F (36.2 °C)  Min: 96.3 °F (35.7 °C)  Max: 98.2 °F (36.8 °C)   24 HR I&O     Intake/Output Summary (Last 24 hours) at 2021 3278  Last data filed at 2021 4544  Gross per 24 hour   Intake 2135 ml   Output 3850 ml   Net -1715 ml     BACK: no tenderness in spine or flanks  ABDOMEN:  soft and non-distended  HEART:  normal rate  CHEST:  Normal respiratory effort  GENITAL/URINARY:  Urethral catheter in place draining clear, yellow urine. SPT in place, plugged.     Data  CBC:   Recent Labs     21   WBC 8.3   HGB 10.9*   HCT 34.8*        BMP:    Recent Labs     21      K 4.1      CO2 24   BUN 11   CREATININE 0.6   GLUCOSE 114*       Recent Labs     21  1531   LABURIN <50,000 CFU/ml*  Light growth  No further workup         U/A:    Lab Results   Component Value Date    COLORU YELLOW 2021    PHUR 6.0 2021    WBCUA 6-9 2021    RBCUA 3-5 2021    YEAST 0 08/24/2021    BACTERIA 1+ 09/17/2021    CLARITYU Clear 09/17/2021    SPECGRAV 1.005 09/17/2021    LEUKOCYTESUR MODERATE 09/17/2021    UROBILINOGEN 0.2 09/17/2021    BILIRUBINUR Negative 09/17/2021    BLOODU LARGE 09/17/2021    GLUCOSEU Negative 09/17/2021       ASSESSMENT AND PLAN    Patient Active Problem List   Diagnosis    Benign prostatic hyperplasia with urinary retention    Bladder neck contracture    Urinary retention    Hypotension    Palliative care patient       1.  Hypotension without tachycardia.  Resolved.     2.  Bladder neck contracture.  S/P incision bladder neck contracture. Urethral Harper draining clear, yellow urine. SPT in place and plugged. Again, he will need to keep the urethral catheter until he follows up with us in the office. We will keep the SPT in place for draining his bladder or measuring residual if he is unable to void after voiding trial.    3.  Suicidal ideation. Patient reports that he was not planning on killing himself. He just wants to go home.         ARIELA CHUNG, BRYAN - CNP  9/21/2021 9:05 AM

## 2021-09-21 NOTE — PROGRESS NOTES
Physical Therapy  Name: Swapnil Mcdonald  MRN:  [id number]  Date of service:  9/21/2021 09/21/21 1534   Subjective   Subjective Attempt: Pt states he is leaving soon and does not want to walk at this time.          Electronically signed by Vishnu Mccoy PTA on 9/21/2021 at 3:35 PM

## 2021-09-21 NOTE — CARE COORDINATION
Pt was cleared by [facility] department and is able to DC home with support and home care services. Recommendations from [facility]  1. Currently patient is not suicidal, homicidal or psychotic. Patient does not meet criteria for psychiatric hospitalization. 2. Patient does have a capacity of making his own medical decisions. 3.  Recommended to discontinue one-to-one sitter. 4.  No recommendations with adjustment of psychotropic medications at this time. 5.  Patient can be discharged home when he is medically stable. 6.  Psychiatry will sign off today.     Electronically signed by Thom Mcmanus on 9/21/2021 at 3:19 PM

## 2021-09-21 NOTE — PROGRESS NOTES
[facility]      Patient:  Mikhail Tavares  YOB: 1934  Date of Service: 9/21/2021  MRN: [id number]   Acct: [de-identified]   Primary Care Physician: Kailash Eddy  Advance Directive: Full Code  Admit Date: 9/16/2021       Hospital Day: 4  Portions of this note have been copied forward, however, changed to reflect the most current clinical status of this patient. CHIEF COMPLAINT urinary retention    SUBJECTIVE: I want to go home      [address] presented to our facility after undergoing TURP for BPH and urinary retention on 4/14/2021.  On 8/24/2021 he was seen in our office with the inability to void.  A catheter was also unable to be placed.  A cystoscopy performed in the office by Dr. Sadie Martinez revealed complete occlusion of the bladder neck consistent with a bladder neck contracture so he was taken to the operating room on that same day where suprapubic catheter was placed. Glenis Brewer was taken to surgery on 9/16/21 by Dr. Sadie Martinez in the hopes to reestablish a urethral tract by performing a bladder neck incision.  Dr. Sadie Martinez was indeed able to perform a bladder neck incision and placed a urethral catheter in addition to his suprapubic catheter.  We had intended to keep him overnight for observation.     Postop day #1, patient having severe hypotension without significant systemic effects.  He remained conscious, was not lethargic, was not confused.  He did complain of some left arm and right arm numbness.  His white count had also risen significantly overnight though he was afebrile.  He was given 2 fluid boluses and had little to no response to those.  At that point, the hospitalist team was consulted and he was moved to the ICU. Glenis Brewer remained there overnight where his blood pressure became stable on pressors.  Sepsis bundle was initiated.  We checked his troponin, and EKG, lactic acid and blood cultures.  Though his lactic acid was elevated, blood and urine cultures to date reveal no growth.     Postop day #2, he was weaned off pressors and he was normotensive. Gilberto Valdez was moved from the ICU back to the floor.  On postop day #3, I gave him the option to return home, however, the patient reports he felt weak and wished to stay 1 more night.  Postop day #4, today, the patient is doing well and ready to proceed home.  We will plan for follow-up in a week to remove his urethral catheter.  The SP tube will remain in place even after removal of the urethral catheter to aid in monitoring residuals or emptying her bladder should the patient have a recurrence of his contracture or difficulty voiding.  This plan has been discussed with the patient and his nurse. Gilberto Valdez voices understanding. Agree with urology that he is good to go home. Normal b/p/  Will follow up with PCP in one week  While preparing patient for discharge he made comments about going home and burning in his house down with himself and it discharge was canceled and psych consult was obtained. Psych has signed off stating he is not suicidal or homicidal.  Will await direction from urology to see if they are okay with discharge  Review of Systems:   Review of Systems   Constitutional: Negative. HENT: Negative. Eyes: Negative. Respiratory: Negative. Cardiovascular: Negative. Gastrointestinal: Negative. Endocrine: Negative. Genitourinary: Negative for difficulty urinating and dysuria. Musculoskeletal: Negative. Allergic/Immunologic: Negative. Neurological: Negative. Hematological: Negative. Psychiatric/Behavioral: Negative. 14 point review of systems is negative except as specifically addressed above.       Objective:   VITALS:  BP (!) 144/76   Pulse 57   Temp 97.6 °F (36.4 °C) (Temporal)   Resp 16   Ht 5' 7\" (1.702 m)   Wt 140 lb (63.5 kg)   SpO2 97%   BMI 21.93 kg/m²   24HR INTAKE/OUTPUT:    Intake/Output Summary (Last 24 hours) at 9/21/2021 0846  Last data filed at 9/21/2021 1359  Gross per 24 hour   Intake 1795 ml   Output 3675 ml   Net -1880 ml       Physical Exam        Medications:      lactated ringers 100 mL/hr at 09/20/21 1806    sodium chloride        ciprofloxacin  500 mg Oral 2 times per day    metFORMIN  1,000 mg Oral BID WC    pantoprazole  40 mg Oral QAM AC    gabapentin  400 mg Oral TID    mirtazapine  30 mg Oral Nightly    atorvastatin  20 mg Oral Daily    [Held by provider] verapamil  120 mg Oral BID    donepezil  10 mg Oral Nightly    ibuprofen  600 mg Oral BID    sodium chloride flush  5-40 mL IntraVENous 2 times per day     guaiFENesin, melatonin, acetaminophen, sodium chloride flush, sodium chloride, polyethylene glycol, ondansetron **OR** ondansetron, HYDROcodone 5 mg - acetaminophen **OR** HYDROcodone 5 mg - acetaminophen, morphine **OR** morphine  ADULT DIET; Regular; 4 carb choices (60 gm/meal)     Lab and other Data:     Recent Labs     09/19/21 0317   WBC 8.3   HGB 10.9*        Recent Labs     09/19/21 0317      K 4.1      CO2 24   BUN 11   CREATININE 0.6   GLUCOSE 114*     Recent Labs     09/19/21 0317   AST 19   ALT 14   BILITOT <0.2   ALKPHOS 87     Troponin T: No results for input(s): TROPONINI in the last 72 hours. Pro-BNP: No results for input(s): BNP in the last 72 hours. INR: No results for input(s): INR in the last 72 hours. UA:No results for input(s): NITRITE, COLORU, PHUR, LABCAST, WBCUA, RBCUA, MUCUS, TRICHOMONAS, YEAST, BACTERIA, CLARITYU, SPECGRAV, LEUKOCYTESUR, UROBILINOGEN, BILIRUBINUR, BLOODU, GLUCOSEU, AMORPHOUS in the last 72 hours. Invalid input(s): Alma Gurney  A1C: No results for input(s): LABA1C in the last 72 hours. ABG:No results for input(s): PHART, KIB7UHW, PO2ART, HMK3DWV, BEART, HGBAE, T1UNWOVA, CARBOXHGBART in the last 72 hours. RAD:   No results found.          Assessment/Plan   Active Problems:    Bladder neck contracture    Urinary retention   resolved  Hypotension    Palliative care patienNT  Suicidal ideation   Psych consult  Resolved Problems:    * No resolved hospital problems.  *      Antibiotic: Cipro    DVT Prophylaxis:scd    GI prophylaxis:  Protonix    Benjie Bedoya, BRYAN - CNP, 9/21/2021 2:47 PM

## 2021-09-21 NOTE — CONSULTS
[facility]  Psychiatry Consult    Reason for Consult: Concern  Suicidal ideations    The primary source(s) of information include(s):  Patient    The patient is a 80 y.o. male without reported previous psychiatric history, who has been admitted to medical services secondary to urinary retention. Patient has been seen in his room with presence of one-to-one sitter. Patient was sitting on his bed and was eating his lunch. It seems that patient was surprised when I informed him that it was reported that he made some suicidal statements. Patient adamantly denies that he made any suicidal statements, stated \"I believe in God. Anthony Massey created me and put me here to live, and will take me when he is ready\". Patient denies suicidal or homicidal ideations during the interview, denies any plans. Patient denies any previous psychiatric history, stated that he was not prescribed any psychotropic medications for his mental health in the past.  During the interview, he denies any affective symptomatology, denies depression, anxiety or psychotic symptoms. Patient did not report any auditory and visual hallucinations. PSYCHIATRIC HISTORY:  Diagnoses: Denies  Suicide attempts/gestures: Denies   Prior hospitalizations: Denies   Medication trials: Denies   Mental health contact: Denies  Head trauma: Denies    Allergies:  Pcn [penicillins]    Mental Status  Appearance: Appropriately groomed and in hospital attire. Made good eye contact. Behavior: Calm, cooperative, friendly, and socially appropriate. No psychomotor retardation/agitation appreciated. Gait was not evaluated, as patient was lying down on the bed. Speech: Normal in tone, volume, and quality. Mood: \"Good\"   Affect: Mood congruent. Range is slightly restricted  Thought Process: Mostly linear and goal oriented  Thought Content: Patient does not have any current active suicidal and homicidal ideations.  No overt delusions or paranoia appreciated. Perceptions: Seems patient does not have any auditory or visual hallucinations at present time. Patient did not appear to be responding to internal stimuli. No overt psychosis. Executive Functions: Appear intact. Concentration: Slightly decreased  Reasoning: Appears intact based on interaction from interview   Orientation: to person, place and situation. Alert. Language: Intact. Fund of information: Intact. Memory: recent and remote appear intact. Impulsivity: Fair  Neurovegitative: Fair appetite, fair sleep  Insight: Present  Judgment: Fair    Assessment  DSM 5 DIAGNOSIS:  Mood disorder, secondary to general medical condition    Medical conditions pertinent to the patient's mental health  Urinary retention    Recommendations  1. Currently patient is not suicidal, homicidal or psychotic. Patient does not meet criteria for psychiatric hospitalization. 2. Patient does have a capacity of making his own medical decisions. 3.  Recommended to discontinue one-to-one sitter. 4.  No recommendations with adjustment of psychotropic medications at this time. 5.  Patient can be discharged home when he is medically stable. 6.  Psychiatry will sign off today.     kT Toribio MD

## 2021-09-21 NOTE — PROGRESS NOTES
Speech Language Pathology  Facility/Department: [facility] SURG SERVICES  Initial Speech/Language/Cognitive Assessment    NAME: Owen Phillips  : 1934   MRN: [id number]  ADMISSION DATE: 2021  ADMITTING DIAGNOSIS: has Benign prostatic hyperplasia with urinary retention; Bladder neck contracture; Urinary retention; Hypotension; and Palliative care patient on their problem list.    Date of Eval: 2021   Evaluating Therapist: Kojo Estrada SLP    Assessment:  Completed [place] Proc. Anuj Womack 1 where patient obtained 21 out of 30 possible points, indicative of mild cognitive-linguistic impairment. Subsequently transitioned to full cognitive-linguistic evaluation where patient exhibited slow processing, delayed auditory comprehension, delayed verbalizations, decreased orientation, and decreased short-term memory. It is noted that during assessment, patient did not verbalize current PCP at independent level. Patient verbalized pharmacy independently. Patient demonstrated ability to verbalize appropriate simple solutions to situations that could occur during activities of daily living at independent level (ie. 911, acid reflux, burns, cuts, falls, fever, fire, headache). Unaware of baseline cognitive-linguistic functioning. At this time, upon D/C from acute hospital setting, do feel patient would benefit from environmental safety measures such as life alert, automatic bill pay, medication organizer with audio alerts/alarms, daily well checks from family and friends via phone or in person and visual schedule/check list used as reminders. Thank you for this consult. Subjective:  Chart Reviewed: Yes  Patient assessed for rehabilitation services?: Yes  Family / Caregiver Present: No     Objective: Auditory Comprehension  Comprehension:  (While delayed, patient demonstrated ability to answer simple yes/no questions regarding immediate environment and current state of being at independent level. While delayed, patient demonstrated ability to follow simple 1 step commands independently. While delayed, patient demonstrated ability to answer yes/no questions of increased complexity and to follow complex 1, simple 2, and simple 3 step commands at independent level.)     Expression  Primary Mode of Expression:  (Confrontation naming of items in room was considered to be [facility]. Structured responsive speech and responses in natural conversation were considered to be frequently delayed but appropriate.)     Motor Speech:  (SLP ranked functional intelligibility of speech for unfamiliar listeners at 100% in verbalizations.)     Overall Orientation Status:  (Patient demonstrated ability to verbalize name, birthday, year, season state, city, hospital, and floor of hospital at independent level. Patient did not verbalize age, address, phone number, date, day of week, month, or county independently.)     Memory:  (Patient demonstrated appropriate immediate memory with sequences of unrelated numbers/words set up to 5 items without repetitions provided. Patient was 0/3 short-term memory with less than 2 minute delay+distractions present.)     Problem Solving:  (It is noted that during assessment, patient did not verbalize current PCP at independent level. Patient verbalized pharmacy independently. Patient demonstrated ability to verbalize appropriate simple solutions to situations that could occur during activities of daily living at independent level (ie. 911, acid reflux, burns, cuts, falls, fever, fire, headache). At this time, upon D/C from acute hospital setting, do feel patient would benefit from environmental safety measures such as life alert, automatic bill pay, medication organizer with audio alerts/alarms, daily well checks from family and friends via phone or in person and visual schedule/check list used as reminders.     Electronically signed by BERTHA Cooper on 9/21/2021 at 2:38 PM

## 2021-09-22 LAB
BLOOD CULTURE, ROUTINE: NORMAL
CULTURE, BLOOD 2: NORMAL

## 2021-09-28 ENCOUNTER — OFFICE VISIT (OUTPATIENT)
Dept: UROLOGY | Age: 86
End: 2021-09-28
Payer: MEDICARE

## 2021-09-28 VITALS — BODY MASS INDEX: 22.29 KG/M2 | HEIGHT: 67 IN | TEMPERATURE: 97.8 F | WEIGHT: 142 LBS

## 2021-09-28 DIAGNOSIS — N40.1 BENIGN PROSTATIC HYPERPLASIA WITH URINARY RETENTION: ICD-10-CM

## 2021-09-28 DIAGNOSIS — R33.8 BENIGN PROSTATIC HYPERPLASIA WITH URINARY RETENTION: ICD-10-CM

## 2021-09-28 DIAGNOSIS — N32.0 BLADDER NECK CONTRACTURE: Primary | ICD-10-CM

## 2021-09-28 DIAGNOSIS — R33.9 URINARY RETENTION: ICD-10-CM

## 2021-09-28 PROCEDURE — 99214 OFFICE O/P EST MOD 30 MIN: CPT | Performed by: NURSE PRACTITIONER

## 2021-09-28 PROCEDURE — 51700 IRRIGATION OF BLADDER: CPT | Performed by: NURSE PRACTITIONER

## 2021-09-28 ASSESSMENT — ENCOUNTER SYMPTOMS
NAUSEA: 0
VOMITING: 0
BACK PAIN: 0
ABDOMINAL DISTENTION: 0
ABDOMINAL PAIN: 0

## 2021-09-28 NOTE — PROGRESS NOTES
Physician Progress Note      PATIENT:               Kennedy Boyd  CSN #:                  [id number]  :                       1934  ADMIT DATE:       2021 12:17 PM  [address] [place] DATE:        2021 8:42 PM  RESPONDING  PROVIDER #:        Stephanie Nathan MD          QUERY TEXT:    Pt admitted with Bladder neck obstruction. Pt noted to have \"sepsis bundle   initiated\" on multiple progress notes. If possible, please document in the   progress notes and discharge summary if you are evaluating and /or treating   any of the following: The medical record reflects the following:  Risk Factors: Bladder Neck Obstruction with surgery for same  Clinical Indicators: severe hypotension post op and moved to ICU for 2 liter   bolus of fluids, was placed on pressors and sepsis  bundle was started, WBC   went up to 23.8 on day 2, Lactic 2.8 on day 2  Treatment: Pressors, 2 liter IV Fluid bolus. Thank you in advance,    Hollie Colón RN-BSN, [facility]  Clinical   [address], [place] [address]  [email or web address]  Options provided:  -- Sepsis, present on admission  -- Sepsis, present on admission, now resolved  -- Sepsis, not present on admission  -- Sepsis was ruled out  -- Other - I will add my own diagnosis  -- Disagree - Not applicable / Not valid  -- Disagree - Clinically unable to determine / Unknown  -- Refer to Clinical Documentation Reviewer    PROVIDER RESPONSE TEXT:    This patient has sepsis that was not present on admission.     Query created by: Tigist Scott on 2021 12:12 PM      Electronically signed by:  Stephanie Nathan MD 2021 5:33 PM

## 2021-09-28 NOTE — PROGRESS NOTES
Physician Progress Note      PATIENT:               Winter Magallon  CSN #:                  [id number]  :                       1934  ADMIT DATE:       2021 12:17 PM  [address] [place] DATE:        2021 8:42 PM  RESPONDING  PROVIDER #:        Beatrice Victoria MD          QUERY TEXT:    Pt admitted with Bladder Neck Obstruction. Pt noted to have Sepsis starting   post op day 1. Provider documents \". R/O Sepsis  secondary to CBI vs. Ongoing urinary retention\" By Dr. Mina Hinkle on 21. Further documentation  noted on the DC summary that the \"sepsis bundle was   started\". If possible, please document in the progress notes and discharge   summary if you are evaluating and/or treating any of the following: The medical record reflects the following:  Risk Factors: Recent Surgery for Bladder Neck Obstruction  Clinical Indicators: evere hypotension post op and moved to ICU for 2 liter   bolus of fluids, was placed on pressors and sepsis  bundle was started, WBC   went up to 23.8 on day 2, Lactic 2.8 on day 2  Treatment: 2 fluid boluses, moved to ICU, Levophed,    Thank you in advance,    Clarissa Cullen, RN-BSN, [facility]  Clinical   [facility]  [address] Rodgers  [email or web address]  Options provided:  -- Cardiogenic Shock  -- Hemorrhagic Shock  -- Septic Shock  -- Hypovolemic Shock  -- Hypovolemia without Shock  -- Hypotension without Shock  -- Other - I will add my own diagnosis  -- Disagree - Not applicable / Not valid  -- Disagree - Clinically unable to determine / Unknown  -- Refer to Clinical Documentation Reviewer    PROVIDER RESPONSE TEXT:    This patient has hypotension without shock.     Query created by: Iman Poole on 2021 2:44 PM      Electronically signed by:  Beatrice Victoria MD 2021 8:52 PM

## 2021-09-28 NOTE — PROGRESS NOTES
Joseph Gurrola is a 80 y.o. male who presents today   Chief Complaint   Patient presents with    Procedure     I am here today to get my fill and pull. Patient is a male who presents the clinic today for follow-up. 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 He originally underwent TURP due to BPH with urinary retention on 4/14/2021. He came in on 8/24/2021 with inability to void. Residuals greater than 400 mL with multiple attempts which were unsuccessful with catheter. Cystoscopy in office revealed complete occlusion of bladder neck consistent with bladder neck contracture. He underwent suprapubic catheter placement on 8/24/2021. He then underwent transurethral incision of bladder neck contracture on 9/17/2021. He comes in today to have urethral catheter removed. He did have somewhat of a complicated course while hospitalized. He became severely hypotensive with some bilateral upper extremity numbness with leukocytosis. Lactic acid was elevated although blood and urine cultures were negative. He tells me he is going out of town in approximately 3 weeks.     Past Medical History:   Diagnosis Date    Arthritis     Bladder neck contracture     Diabetes mellitus ([address]     History of blood transfusion     Hyperlipidemia     Hypertension     Immunization due     Neuropathy     Palliative care patient 09/20/2021       Past Surgical History:   Procedure Laterality Date    COLONOSCOPY      CYSTOSCOPY N/A 8/24/2021    SUPRAPUBIC TUBE PLACEMENT performed by Ender Cooley MD at [facility] N/A 9/16/2021    CYSTOSCOPY TRANSURETHRAL inison BLADDER Bladder neck contracture ; exchange supra pubic catheter performed by Ender Cooley MD at [address] [place] [address], [facility]      EYE SURGERY Bilateral     cataract    NOSE SURGERY      SKIN BIOPSY      TONSILLECTOMY      TURP N/A 4/14/2021    TRANSURETHRAL RESECTION PROSTATE performed by Ira Hendricks Harpal Wilson MD at [facility] OR       Current Outpatient Medications   Medication Sig Dispense Refill    indomethacin (INDOCIN) 25 MG capsule Take 25 mg by mouth 3 times daily as needed       diclofenac sodium (VOLTAREN) 1 % GEL APPLY TWO TO FOUR GRAMS TO AFFECTED AREA TWICE DAILY AS NEEDED FOR PAIN      acetaminophen (TYLENOL) 500 MG tablet Take 2 tablets by mouth 4 times daily as needed for Pain 1 tablet 0    ibuprofen (ADVIL;MOTRIN) 600 MG tablet Take 1 tablet by mouth 2 times daily HOLD THIS MEDICATION FOR AT LEAST 5 DAYS 120 tablet 3    omeprazole (PRILOSEC) 20 MG delayed release capsule Take 20 mg by mouth daily       gabapentin (NEURONTIN) 400 MG capsule Take 400 mg by mouth 3 times daily.  guaiFENesin 400 MG tablet Take 400 mg by mouth 4 times daily as needed for Cough       mirtazapine (REMERON) 30 MG tablet Take 30 mg by mouth nightly       atorvastatin (LIPITOR) 20 MG tablet Take 20 mg by mouth daily Take 1/2 tab at bedtime.  verapamil (CALAN) 120 MG tablet Take 120 mg by mouth 2 times daily       donepezil (ARICEPT) 10 MG tablet Take 10 mg by mouth nightly       melatonin 3 MG TABS tablet Take 3 mg by mouth nightly as needed Take 2 hs prn       metFORMIN (GLUCOPHAGE) 1000 MG tablet Take 1,000 mg by mouth 2 times daily (with meals)        No current facility-administered medications for this visit. Allergies   Allergen Reactions    Pcn [Penicillins] Hives     \"Huge dose of PCN\"       Social History     Socioeconomic History    Marital status:       Spouse name: None    Number of children: 0    Years of education: None    Highest education level: None   Occupational History    None   Tobacco Use    Smoking status: Former Smoker     Types: Cigars     Quit date:      Years since quittin.7    Smokeless tobacco: Never Used   Vaping Use    Vaping Use: Never used   Substance and Sexual Activity    Alcohol use: Not Currently    Drug use: Never    Sexual activity: None Other Topics Concern    None   Social History Narrative    None     Social Determinants of Health     Financial Resource Strain:     Difficulty of Paying Living Expenses:    Food Insecurity:     Worried About Running Out of Food in the Last Year:     [address] in the Last Year:    Transportation Needs:     Lack of Transportation (Medical):  Lack of Transportation (Non-Medical):    Physical Activity:     Days of Exercise per Week:     Minutes of Exercise per Session:    Stress:     Feeling of Stress :    Social Connections:     Frequency of Communication with Friends and Family:     Frequency of Social Gatherings with Friends and Family:     Attends J[employer] Services:     Active Member of Clubs or Organizations:     Attends Club or Organization Meetings:     Marital Status:    Intimate Partner Violence:     Fear of Current or Ex-Partner:     Emotionally Abused:     Physically Abused:     Sexually Abused:        No family history on file. REVIEW OF SYSTEMS:  Review of Systems   Constitutional: Negative for chills and fever. Gastrointestinal: Negative for abdominal distention, abdominal pain, nausea and vomiting. Genitourinary: Positive for difficulty urinating. Negative for dysuria, flank pain, frequency, hematuria and urgency. Musculoskeletal: Positive for gait problem. Negative for back pain. Neurological: Positive for weakness. Psychiatric/Behavioral: Positive for confusion (Baseline). Negative for agitation, self-injury and suicidal ideas. PHYSICAL EXAM:  Temp 97.8 °F (36.6 °C) (Temporal)   Ht 5' 7\" (1.702 m)   Wt 142 lb (64.4 kg)   BMI 22.24 kg/m²   Physical Exam  Vitals and nursing note reviewed. Constitutional:       General: He is not in acute distress. Appearance: He is not ill-appearing. Pulmonary:      Effort: Pulmonary effort is normal. No respiratory distress. Abdominal:      General: There is no distension. Tenderness:  There is no abdominal tenderness. There is no right CVA tenderness or left CVA tenderness. Genitourinary:      Neurological:      Mental Status: He is alert. Mental status is at baseline. He is disoriented. Motor: Weakness present. Gait: Gait abnormal.   Psychiatric:         Mood and Affect: Mood normal.         Behavior: Behavior normal.           1. Urinary retention  2. Bladder neck contracture  Patient status post transurethral incision of bladder neck contracture on 9/17/2021. Urethral catheter was discontinued today although patient was unable to void. Educated on emptying his suprapubic cath at least 4-5 times per day. Discontinued dressing and cleansed area around suprapubic cath. Will have patient follow-up in 2 weeks with Dr. Marko Mckeon for postop visit as well as suprapubic cath change. Patient is going out of town in approximately 3 weeks. 3. Benign prostatic hyperplasia with urinary retention  Still unable to void after TURP and transurethral incision of bladder neck contracture. He does have a floppy bladder on cystoscope which is likely contributing to his incomplete emptying. No orders of the defined types were placed in this encounter. Return in about 16 days (around 10/14/2021) for with Dr. Marko Mckeon. Will also need suprapubic cath change that day. All information inputted into the note by the MA to include chief complaint, past medical history, past surgical history, medications, allergies, social and family history and review of systems has been reviewed and updated as needed by me. EMR Dragon/transcription disclaimer: Much of this documentt is electronic  transcription/translation of spoken language to printed text. The  electronic translation of spoken language may be erroneous, or at times,  nonsensical words or phrases may be inadvertently transcribed.  Although I  have reviewed the document for such errors, some may still exist.

## 2021-09-29 NOTE — PROGRESS NOTES
Patient of C. Stephany Bamberger presents today for voiding trial post episode of urinary retention. The patient denies any fever, chills or  N&V. After patient had given consent, using the catheter in place, 240cc of sterile water was installed into the bladder with no complications. Patient was unable to void, suprapubic catheter was then drained of 250cc. C. Stephany Bamberger was in office at time of procedure. Patient is to follow up as scheduled.

## 2021-09-30 NOTE — PROGRESS NOTES
Physician Progress Note      PATIENT:               Agapito Florence  [facility] #:                  [id number]  :                       1934  ADMIT DATE:       2021 12:17 PM  [address] DATE:        2021 8:42 PM  RESPONDING  PROVIDER #:        Opal Hernandez MD          QUERY TEXT:    Pt admitted with Bladder Neck Contracture. Pt noted to have some   intra-operative bleeding. If possible, please document in progress notes and   discharge summary:    The medical record reflects the following:  Risk Factors: Bladder Neck Contracture, Recent TURP  Clinical Indicators: c/o dysuria, bladder distention, OP NOTE: \"I also cut at   12 o'clock just a small amount, maybe two times the length of the Hoskins   knife just to make sure this was open and then an incision was made just to   take off any posterior bar through the 6 o'clock position. This created a wide   open bladder neck. There were some bleeding vessels on the left side that   were cauterized with the tip of the Hoskins knife with pinpoint cauterization,   which controlled the bleeding\". Was hypotensive after surgery, had to be put   on pressors. Treatment: Surgery, Leveophed    Thank you in advance,    Louie Hernández, RN-BSN, [facility]  Clinical   [facility]  [address]  [email or web address]  Options provided:  -- Bleeding as an intraoperative complication  -- Bleeding as an expected/inherent condition that occurred intraoperatively   and not a complication  -- Bleeding related to other incidental risk factor (please specify) occurring   during surgery and not a complication, Please document incidental risk   factor.   -- Other - I will add my own diagnosis  -- Disagree - Not applicable / Not valid  -- Disagree - Clinically unable to determine / Unknown  -- Refer to Clinical Documentation Reviewer    PROVIDER RESPONSE TEXT:    Patient has a Bleeding as an inherent condition that occurred intraoperatively   and not a complication.     Query created by: Izzy Stanton on 9/30/2021 10:26 AM      Electronically signed by:  Trudie Galeazzi MD 9/30/2021 10:42 AM

## 2021-10-14 ENCOUNTER — OFFICE VISIT (OUTPATIENT)
Dept: UROLOGY | Age: 86
End: 2021-10-14
Payer: MEDICARE

## 2021-10-14 VITALS — BODY MASS INDEX: 20.72 KG/M2 | WEIGHT: 139.9 LBS | HEIGHT: 69 IN

## 2021-10-14 DIAGNOSIS — N32.0 BLADDER NECK CONTRACTURE: ICD-10-CM

## 2021-10-14 DIAGNOSIS — R33.9 URINARY RETENTION: Primary | ICD-10-CM

## 2021-10-14 LAB
BACTERIA URINE, POC: 2
BILIRUBIN URINE: 0 MG/DL
BLOOD, URINE: POSITIVE
CASTS URINE, POC: ABNORMAL
CLARITY: CLEAR
COLOR: YELLOW
CRYSTALS URINE, POC: ABNORMAL
EPI CELLS URINE, POC: ABNORMAL
GLUCOSE URINE: 500
KETONES, URINE: POSITIVE
LEUKOCYTE EST, POC: ABNORMAL
NITRITE, URINE: POSITIVE
PH UA: 5.5 (ref 4.5–8)
PROTEIN UA: POSITIVE
RBC URINE, POC: 20
SPECIFIC GRAVITY UA: 1.02 (ref 1–1.03)
UROBILINOGEN, URINE: NORMAL
WBC URINE, POC: 20
YEAST URINE, POC: ABNORMAL

## 2021-10-14 PROCEDURE — 51705 CHANGE OF BLADDER TUBE: CPT | Performed by: UROLOGY

## 2021-10-14 PROCEDURE — 81001 URINALYSIS AUTO W/SCOPE: CPT | Performed by: UROLOGY

## 2021-10-14 PROCEDURE — 99024 POSTOP FOLLOW-UP VISIT: CPT | Performed by: UROLOGY

## 2021-10-14 ASSESSMENT — ENCOUNTER SYMPTOMS
VOMITING: 0
FACIAL SWELLING: 0
NAUSEA: 0
BACK PAIN: 0
EYE REDNESS: 0
CHEST TIGHTNESS: 0
SORE THROAT: 0
WHEEZING: 0
EYE DISCHARGE: 0

## 2021-10-14 NOTE — PROGRESS NOTES
Malini Trevino is a 80 y.o. male who presents today   Chief Complaint   Patient presents with    Follow-up     I am here today for FU due to BPH with retention        Urinary retention  Patient is a male who presents the clinic today for follow-up. 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 He originally underwent TURP due to BPH with urinary retention on 4/14/2021. He came in on 8/24/2021 with inability to void. Residuals greater than 400 mL with multiple attempts which were unsuccessful with catheter. Cystoscopy in office revealed complete occlusion of bladder neck consistent with bladder neck contracture. He underwent suprapubic catheter placement on 8/24/2021. He then underwent transurethral incision of bladder neck contracture on 9/17/2021. Patient seen by BRYAN Garcia in urology on 8/9/2021. He was status post transurethral incision of bladder neck contracture. His suprapubic tube remains in place and plugged. His urethral catheter was removed on that date. He now comes in for follow-up to see how he is voiding after removal of the Harper catheter. He was supposed to be checking residuals to the suprapubic catheter but apparently has not been doing so though he reports he is voiding a good amount feels like he is able to relieve himself and get his bladder empty. He here today requesting his here. To be removed. It is due to be changed.   He denies any hematuria no fevers he is having some mild dysuria      Past Medical History:   Diagnosis Date    Arthritis     Bladder neck contracture     Diabetes mellitus ([facility] [address]     History of blood transfusion     Hyperlipidemia     Hypertension     Immunization due     Neuropathy     Palliative care patient 09/20/2021       Past Surgical History:   Procedure Laterality Date    COLONOSCOPY      CYSTOSCOPY N/A 8/24/2021    SUPRAPUBIC TUBE PLACEMENT performed by Harman Trinidad MD at [facility] 9/16/2021    CYSTOSCOPY TRANSURETHRAL inison BLADDER Bladder neck contracture ; exchange supra pubic catheter performed by Corliss Runner, MD at [address] [place] [address] ENDOSCOPY, COLON, DIAGNOSTIC      EYE SURGERY Bilateral     cataract    NOSE SURGERY      SKIN BIOPSY      TONSILLECTOMY      TURP N/A 4/14/2021    TRANSURETHRAL RESECTION PROSTATE performed by Corliss Runner, MD at [facility] OR       Current Outpatient Medications   Medication Sig Dispense Refill    indomethacin (INDOCIN) 25 MG capsule Take 25 mg by mouth 3 times daily as needed       diclofenac sodium (VOLTAREN) 1 % GEL APPLY TWO TO FOUR GRAMS TO AFFECTED AREA TWICE DAILY AS NEEDED FOR PAIN      acetaminophen (TYLENOL) 500 MG tablet Take 2 tablets by mouth 4 times daily as needed for Pain 1 tablet 0    ibuprofen (ADVIL;MOTRIN) 600 MG tablet Take 1 tablet by mouth 2 times daily HOLD THIS MEDICATION FOR AT LEAST 5 DAYS 120 tablet 3    omeprazole (PRILOSEC) 20 MG delayed release capsule Take 20 mg by mouth daily       gabapentin (NEURONTIN) 400 MG capsule Take 400 mg by mouth 3 times daily.  guaiFENesin 400 MG tablet Take 400 mg by mouth 4 times daily as needed for Cough       mirtazapine (REMERON) 30 MG tablet Take 30 mg by mouth nightly       atorvastatin (LIPITOR) 20 MG tablet Take 20 mg by mouth daily Take 1/2 tab at bedtime.  verapamil (CALAN) 120 MG tablet Take 120 mg by mouth 2 times daily       donepezil (ARICEPT) 10 MG tablet Take 10 mg by mouth nightly       melatonin 3 MG TABS tablet Take 3 mg by mouth nightly as needed Take 2 hs prn       metFORMIN (GLUCOPHAGE) 1000 MG tablet Take 1,000 mg by mouth 2 times daily (with meals)        No current facility-administered medications for this visit. Allergies   Allergen Reactions    Pcn [Penicillins] Hives     \"Huge dose of PCN\"       Social History     Socioeconomic History    Marital status:       Spouse name: None    Number of children: 0    Years of education: None    Highest education level: None   Occupational History    None   Tobacco Use    Smoking status: Former Smoker     Types: Cigars     Quit date: 2000     Years since quittin.8    Smokeless tobacco: Never Used   Vaping Use    Vaping Use: Never used   Substance and Sexual Activity    Alcohol use: Not Currently    Drug use: Never    Sexual activity: None   Other Topics Concern    None   Social History Narrative    None     Social Determinants of Health     Financial Resource Strain:     Difficulty of Paying Living Expenses:    Food Insecurity:     Worried About Running Out of Food in the Last Year:     Ran Out of Food in the Last Year:    Transportation Needs:     Lack of Transportation (Medical):  Lack of Transportation (Non-Medical):    Physical Activity:     Days of Exercise per Week:     Minutes of Exercise per Session:    Stress:     Feeling of Stress :    Social Connections:     Frequency of Communication with Friends and Family:     Frequency of Social Gatherings with Friends and Family:     Attends [employer] Services:     Active Member of Clubs or Organizations:     Attends Club or Organization Meetings:     Marital Status:    Intimate Partner Violence:     Fear of Current or Ex-Partner:     Emotionally Abused:     Physically Abused:     Sexually Abused:        History reviewed. No pertinent family history. REVIEW OF SYSTEMS:  Review of Systems   Constitutional: Negative for chills and fever. HENT: Negative for facial swelling and sore throat. Eyes: Negative for discharge and redness. Respiratory: Negative for chest tightness and wheezing. Cardiovascular: Negative for chest pain and palpitations. Gastrointestinal: Negative for nausea and vomiting. Endocrine: Negative for polyphagia and polyuria. Genitourinary: Positive for dysuria.  Negative for decreased urine volume, difficulty urinating, discharge, enuresis, flank pain, frequency, genital sores, hematuria, penile pain, penile swelling, scrotal swelling, testicular pain and urgency. Musculoskeletal: Negative for back pain and neck stiffness. Skin: Negative for rash and wound. Neurological: Negative for dizziness and headaches. Hematological: Negative for adenopathy. Does not bruise/bleed easily. Psychiatric/Behavioral: Negative for confusion and hallucinations. PHYSICAL EXAM:  Ht 5' 8.5\" (1.74 m)   Wt 139 lb 14.4 oz (63.5 kg)   BMI 20.96 kg/m²   Physical Exam  Constitutional:       General: He is not in acute distress. Appearance: Normal appearance. He is well-developed. HENT:      Head: Normocephalic and atraumatic. Nose: Nose normal.   Eyes:      General: No scleral icterus. Conjunctiva/sclera: Conjunctivae normal.      Pupils: Pupils are equal, round, and reactive to light. Neck:      Trachea: No tracheal deviation. Cardiovascular:      Rate and Rhythm: Normal rate and regular rhythm. Pulmonary:      Effort: Pulmonary effort is normal. No respiratory distress. Breath sounds: No stridor. Abdominal:      General: There is no distension. Palpations: Abdomen is soft. There is no mass. Tenderness: There is no abdominal tenderness. Comments: Suprapubic site has some expected exudate but no erythema or signs of infection. There is some granulation tissue. The nurse change his suprapubic tube is documented. I did unplug the tube to drain it inhalers is about 100 cc   Genitourinary:     Penis: Normal and circumcised. Testes: Normal.   Musculoskeletal:         General: No tenderness. Normal range of motion. Cervical back: Normal range of motion and neck supple. Lymphadenopathy:      Cervical: No cervical adenopathy. Skin:     General: Skin is warm and dry. Findings: No erythema. Neurological:      Mental Status: He is alert and oriented to person, place, and time.    Psychiatric:         Behavior: Behavior normal. Judgment: Judgment normal.             DATA:  CBC:   Lab Results   Component Value Date    WBC 8.3 09/19/2021    RBC 3.76 09/19/2021    HGB 10.9 09/19/2021    HCT 34.8 09/19/2021    MCV 92.6 09/19/2021    MCH 29.0 09/19/2021    MCHC 31.3 09/19/2021    RDW 14.1 09/19/2021     09/19/2021    MPV 10.8 09/19/2021     CMP:    Lab Results   Component Value Date     09/19/2021    K 4.1 09/19/2021    K 3.8 09/17/2021     09/19/2021    CO2 24 09/19/2021    BUN 11 09/19/2021    CREATININE 0.6 09/19/2021    GFRAA >59 09/19/2021    LABGLOM >60 09/19/2021    GLUCOSE 114 09/19/2021    PROT 4.5 09/19/2021    LABALBU 2.8 09/19/2021    CALCIUM 8.3 09/19/2021    BILITOT <0.2 09/19/2021    ALKPHOS 87 09/19/2021    AST 19 09/19/2021    ALT 14 09/19/2021     Results for orders placed or performed in visit on 10/14/21   POCT Urinalysis Dipstick w/ Micro (Auto)   Result Value Ref Range    Color, UA Yellow     Clarity, UA Clear Clear    Glucose, Ur 500     Bilirubin Urine 0 mg/dL    Ketones, Urine Positive     Specific Gravity, UA 1.025 1.005 - 1.030    Blood, Urine Positive     pH, UA 5.5 4.5 - 8.0    Protein, UA Positive (A) Negative    Nitrite, Urine Positive     Leukocytes, UA large     Urobilinogen, Urine Normal     rbc urine, poc 20     wbc urine, poc 20     bacteria urine, poc 2     yeast urine, poc      casts urine, poc      epi cells urine, poc      crystals urine, poc           1. Urinary retention  This was his initial presenting problem prior to his TURP done on 4/14/2021. He developed post TURP retention related to this bladder neck contracture. Today his residual was only 100 mL and he reports voiding okay. His suprapubic tube was changed today and we will leave this in place and plugged until we can perform cystoscopy to make sure his bladder neck contracture stays open. This was explained to him.   He did have some low-level pyuria and some bacteriuria probably colonization therefore would not treat with antibiotics. - POCT Urinalysis Dipstick w/ Micro (Auto)    2. Bladder neck contracture  We will have him keep the suprapubic tube for another month have him return to see me for cystoscopy if his bladder neck contracture stays open his open at that time he still voiding without elevated residuals we will remove the suprapubic tube. He will need to be closely followed for emptying and for developing recurrent contracture  - Cystoscopy; Future      Orders Placed This Encounter   Procedures    POCT Urinalysis Dipstick w/ Micro (Auto)    Cystoscopy     Next available     Standing Status:   Future     Standing Expiration Date:   1/14/2022     Scheduling Instructions:      Next available in one month        Return in about 1 month (around 11/14/2021) for Cystoscopy on next visit. All information inputted into the note by the MA to include chief complaint, past medical history, past surgical history, medications, allergies, social and family history and review of systems has been reviewed and updated as needed by me. EMR Dragon/transcription disclaimer: Much of this documentt is electronic  transcription/translation of spoken language to printed text. The  electronic translation of spoken language may be erroneous, or at times,  nonsensical words or phrases may be inadvertently transcribed.  Although I  have reviewed the document for such errors, some may still exist.

## 2021-10-14 NOTE — PROGRESS NOTES
Suprapubic Catheter   Patient presents today with a history of a recent suprapubic catheter for several weeks. The etiology is felt to be due to: urinary retention from a bladder neck contracture Overall, the problem is better. Pt is able to void today on own with a 100ml residule. Procedure Note (10/14/21):    180 ml of sterile water was inserted through SP cath and plugged. The patient's suprapubic catheter was removed. Using sterile technique patient was cleansed with Hibiclens and sterile water solution, 16 [place] SP catheter was inserted into the bladder, urine was drained from bladder, 10 mL of sterile water was used to fill up the catheter balloon and catheter was plugged. The 180 ml of sterile water was drained from bladder. The patient tolerated the procedure well. Dr. Llewellyn Cowden was in office at time of procedure. Patient will follow up in 1 month for cysto.

## 2021-11-15 ENCOUNTER — HOSPITAL ENCOUNTER (OUTPATIENT)
Dept: PREADMISSION TESTING | Age: 86
Discharge: HOME | End: 2021-11-19
Payer: MEDICARE

## 2021-11-15 ENCOUNTER — PROCEDURE VISIT (OUTPATIENT)
Dept: UROLOGY | Age: 86
End: 2021-11-15
Payer: MEDICARE

## 2021-11-15 VITALS — WEIGHT: 156 LBS | HEIGHT: 66 IN | BODY MASS INDEX: 25.07 KG/M2

## 2021-11-15 VITALS — WEIGHT: 143 LBS | BODY MASS INDEX: 22.98 KG/M2 | HEIGHT: 66 IN | TEMPERATURE: 97.2 F

## 2021-11-15 DIAGNOSIS — R30.0 DYSURIA: Primary | ICD-10-CM

## 2021-11-15 DIAGNOSIS — N32.0 BLADDER NECK CONTRACTURE: ICD-10-CM

## 2021-11-15 LAB
ANION GAP SERPL CALCULATED.3IONS-SCNC: 12 MMOL/L (ref 7–19)
BACTERIA URINE, POC: ABNORMAL
BASOPHILS ABSOLUTE: 0 K/UL (ref 0–0.2)
BASOPHILS RELATIVE PERCENT: 0.3 % (ref 0–1)
BILIRUBIN URINE: 0 MG/DL
BLOOD, URINE: POSITIVE
BUN BLDV-MCNC: 16 MG/DL (ref 8–23)
CALCIUM SERPL-MCNC: 9.2 MG/DL (ref 8.8–10.2)
CASTS URINE, POC: 0
CHLORIDE BLD-SCNC: 101 MMOL/L (ref 98–111)
CLARITY: CLEAR
CO2: 25 MMOL/L (ref 22–29)
COLOR: YELLOW
CREAT SERPL-MCNC: 0.7 MG/DL (ref 0.5–1.2)
CRYSTALS URINE, POC: 0
EKG P AXIS: -13 DEGREES
EKG P-R INTERVAL: 432 MS
EKG Q-T INTERVAL: 430 MS
EKG QRS DURATION: 92 MS
EKG QTC CALCULATION (BAZETT): 422 MS
EKG T AXIS: 67 DEGREES
EOSINOPHILS ABSOLUTE: 0.1 K/UL (ref 0–0.6)
EOSINOPHILS RELATIVE PERCENT: 0.7 % (ref 0–5)
EPI CELLS URINE, POC: 0
GFR AFRICAN AMERICAN: >59
GFR NON-AFRICAN AMERICAN: >60
GLUCOSE BLD-MCNC: 147 MG/DL (ref 74–109)
GLUCOSE URINE: ABNORMAL
HCT VFR BLD CALC: 39.3 % (ref 42–52)
HEMOGLOBIN: 12.3 G/DL (ref 14–18)
IMMATURE GRANULOCYTES #: 0 K/UL
KETONES, URINE: NEGATIVE
LEUKOCYTE EST, POC: ABNORMAL
LYMPHOCYTES ABSOLUTE: 1.7 K/UL (ref 1.1–4.5)
LYMPHOCYTES RELATIVE PERCENT: 16.5 % (ref 20–40)
MCH RBC QN AUTO: 28.5 PG (ref 27–31)
MCHC RBC AUTO-ENTMCNC: 31.3 G/DL (ref 33–37)
MCV RBC AUTO: 91.2 FL (ref 80–94)
MONOCYTES ABSOLUTE: 0.6 K/UL (ref 0–0.9)
MONOCYTES RELATIVE PERCENT: 5.8 % (ref 0–10)
NEUTROPHILS ABSOLUTE: 7.8 K/UL (ref 1.5–7.5)
NEUTROPHILS RELATIVE PERCENT: 76.3 % (ref 50–65)
NITRITE, URINE: POSITIVE
PDW BLD-RTO: 13.2 % (ref 11.5–14.5)
PH UA: 6.5 (ref 4.5–8)
PLATELET # BLD: 262 K/UL (ref 130–400)
PMV BLD AUTO: 10.8 FL (ref 9.4–12.4)
POTASSIUM SERPL-SCNC: 4.2 MMOL/L (ref 3.5–5)
PROTEIN UA: ABNORMAL
RBC # BLD: 4.31 M/UL (ref 4.7–6.1)
RBC URINE, POC: 2
SARS-COV-2, NAAT: NOT DETECTED
SODIUM BLD-SCNC: 138 MMOL/L (ref 136–145)
SPECIFIC GRAVITY UA: 1 (ref 1–1.03)
UROBILINOGEN, URINE: NORMAL
WBC # BLD: 10.3 K/UL (ref 4.8–10.8)
WBC URINE, POC: ABNORMAL
YEAST URINE, POC: 0

## 2021-11-15 PROCEDURE — 85025 COMPLETE CBC W/AUTO DIFF WBC: CPT

## 2021-11-15 PROCEDURE — 99024 POSTOP FOLLOW-UP VISIT: CPT | Performed by: UROLOGY

## 2021-11-15 PROCEDURE — 93010 ELECTROCARDIOGRAM REPORT: CPT | Performed by: INTERNAL MEDICINE

## 2021-11-15 PROCEDURE — 51798 US URINE CAPACITY MEASURE: CPT | Performed by: UROLOGY

## 2021-11-15 PROCEDURE — G8417 CALC BMI ABV UP PARAM F/U: HCPCS | Performed by: UROLOGY

## 2021-11-15 PROCEDURE — 4040F PNEUMOC VAC/ADMIN/RCVD: CPT | Performed by: UROLOGY

## 2021-11-15 PROCEDURE — 87635 SARS-COV-2 COVID-19 AMP PRB: CPT

## 2021-11-15 PROCEDURE — 1036F TOBACCO NON-USER: CPT | Performed by: UROLOGY

## 2021-11-15 PROCEDURE — 52000 CYSTOURETHROSCOPY: CPT | Performed by: UROLOGY

## 2021-11-15 PROCEDURE — G8427 DOCREV CUR MEDS BY ELIG CLIN: HCPCS | Performed by: UROLOGY

## 2021-11-15 PROCEDURE — G8484 FLU IMMUNIZE NO ADMIN: HCPCS | Performed by: UROLOGY

## 2021-11-15 PROCEDURE — 81001 URINALYSIS AUTO W/SCOPE: CPT | Performed by: UROLOGY

## 2021-11-15 PROCEDURE — 80048 BASIC METABOLIC PNL TOTAL CA: CPT

## 2021-11-15 PROCEDURE — 93005 ELECTROCARDIOGRAM TRACING: CPT | Performed by: UROLOGY

## 2021-11-15 PROCEDURE — 1123F ACP DISCUSS/DSCN MKR DOCD: CPT | Performed by: UROLOGY

## 2021-11-15 RX ORDER — ASPIRIN 81 MG/1
81 TABLET ORAL DAILY
COMMUNITY

## 2021-11-15 RX ORDER — MEMANTINE HYDROCHLORIDE 10 MG/1
10 TABLET ORAL 2 TIMES DAILY
COMMUNITY

## 2021-11-15 RX ORDER — LISINOPRIL 5 MG/1
5 TABLET ORAL DAILY
COMMUNITY

## 2021-11-15 RX ORDER — LEVOFLOXACIN 500 MG/1
500 TABLET, FILM COATED ORAL DAILY
Qty: 10 TABLET | Refills: 0 | Status: SHIPPED | OUTPATIENT
Start: 2021-11-15 | End: 2021-11-25

## 2021-11-15 RX ORDER — VITAMIN E 268 MG
400 CAPSULE ORAL DAILY
COMMUNITY

## 2021-11-15 RX ORDER — PYRIDOXINE HCL (VITAMIN B6) 100 MG
500 TABLET ORAL 2 TIMES DAILY
COMMUNITY
End: 2021-11-15

## 2021-11-15 ASSESSMENT — ENCOUNTER SYMPTOMS
WHEEZING: 0
CHEST TIGHTNESS: 0
NAUSEA: 0
EYE DISCHARGE: 0
EYE REDNESS: 0
BACK PAIN: 0
FACIAL SWELLING: 0
SORE THROAT: 0
VOMITING: 0

## 2021-11-15 NOTE — PROGRESS NOTES
Nikky Bland is a 80 y.o. male who presents today   Chief Complaint   Patient presents with    Cystoscopy     I am here today for 1 month cysto. Urinary retention  Patient is a male who presents the clinic today for follow-up. 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 He originally underwent TURP due to BPH with urinary retention on 4/14/2021. He came in on 8/24/2021 with inability to void. Residuals greater than 400 mL with multiple attempts which were unsuccessful with catheter. Cystoscopy in office revealed complete occlusion of bladder neck consistent with bladder neck contracture. He underwent suprapubic catheter placement on 8/24/2021. He then underwent transurethral incision of bladder neck contracture on 9/17/2021.       Patient seen by BRYAN Leiva in urology on 8/9/2021. He was status post transurethral incision of bladder neck contracture. His suprapubic tube remains in place and plugged. His urethral catheter was removed on that date. He now comes in for follow-up to see how he is voiding after removal of the Harper catheter. He was supposed to be checking residuals to the suprapubic catheter but apparently has not been doing so though he reports he is voiding a good amount feels like he is able to relieve himself and get his bladder empty. He was last seen on 10/14/2021 at that time we will change his suprapubic tube. The plan was to keep the suprapubic tube plugged he was to check his residuals as above and follow-up to see me today for cystoscopy of his bladder neck was open we will remove the suprapubic tube.   He is complaining of some dysuria today      Past Medical History:   Diagnosis Date    Arthritis     Bladder neck contracture     Diabetes mellitus ([facility] [address]     History of blood transfusion     Hyperlipidemia     Hypertension     Immunization due     Neuropathy     Palliative care patient 09/20/2021       Past Surgical History: Procedure Laterality Date    COLONOSCOPY      CYSTOSCOPY N/A 8/24/2021    SUPRAPUBIC TUBE PLACEMENT performed by Beatrice Abarca MD at [facility] N/A 9/16/2021    CYSTOSCOPY TRANSURETHRAL inison BLADDER Bladder neck contracture ; exchange supra pubic catheter performed by Beatrice Abarca MD at [address] [place] [address] ENDOSCOPY, COLON, DIAGNOSTIC      EYE SURGERY Bilateral     cataract    NOSE SURGERY      SKIN BIOPSY      TONSILLECTOMY      TURP N/A 4/14/2021    TRANSURETHRAL RESECTION PROSTATE performed by Beatrice Abarca MD at [facility] OR       Current Outpatient Medications   Medication Sig Dispense Refill    aspirin 81 MG EC tablet Take 81 mg by mouth daily      lisinopril (PRINIVIL;ZESTRIL) 5 MG tablet Take 5 mg by mouth daily      memantine (NAMENDA) 10 MG tablet Take 10 mg by mouth 2 times daily      vitamin E 400 UNIT capsule Take 400 Units by mouth daily      levoFLOXacin (LEVAQUIN) 500 MG tablet Take 1 tablet by mouth daily for 10 days 10 tablet 0    omeprazole (PRILOSEC) 20 MG delayed release capsule Take 20 mg by mouth daily       mirtazapine (REMERON) 30 MG tablet Take 15 mg by mouth nightly       atorvastatin (LIPITOR) 20 MG tablet Take 20 mg by mouth daily Take 1/2 tab at bedtime.  verapamil (CALAN) 120 MG tablet Take 120 mg by mouth 2 times daily       donepezil (ARICEPT) 10 MG tablet Take 10 mg by mouth nightly       melatonin 3 MG TABS tablet Take 3 mg by mouth nightly as needed Take 2 hs prn       metFORMIN (GLUCOPHAGE) 1000 MG tablet Take 500 mg by mouth 2 times daily (with meals)       hypromellose (ISOPTO TEARS) 0.5 % ophthalmic solution Place 2 drops into both eyes 2 times daily      Cranberry 500 MG TABS Take 1 tablet by mouth daily      Multiple Vitamins-Minerals (MULTIVITAMIN MEN 50+) TABS Take 1 tablet by mouth daily       No current facility-administered medications for this visit.        Allergies   Allergen Reactions    Pcn [Penicillins] Hives     \"Huge dose of PCN\"       Social History     Socioeconomic History    Marital status:      Spouse name: None    Number of children: 0    Years of education: None    Highest education level: None   Occupational History    None   Tobacco Use    Smoking status: Former Smoker     Types: Cigars     Quit date:      Years since quittin.8    Smokeless tobacco: Never Used   Vaping Use    Vaping Use: Never used   Substance and Sexual Activity    Alcohol use: Not Currently    Drug use: Never    Sexual activity: None   Other Topics Concern    None   Social History Narrative    None     Social Determinants of Health     Financial Resource Strain:     Difficulty of Paying Living Expenses: Not on file   Food Insecurity:     Worried About Running Out of Food in the Last Year: Not on file    [place] of Food in the Last Year: Not on file   Transportation Needs:     Lack of Transportation (Medical): Not on file    Lack of Transportation (Non-Medical): Not on file   Physical Activity:     Days of Exercise per Week: Not on file    Minutes of Exercise per Session: Not on file   Stress:     Feeling of Stress : Not on file   Social Connections:     Frequency of Communication with Friends and Family: Not on file    Frequency of Social Gatherings with Friends and Family: Not on file    Attends [employer] Services: Not on file    Active Member of [address] [place] [address] or Organizations: Not on file    Attends Club or Organization Meetings: Not on file    Marital Status: Not on file   Intimate Partner Violence:     Fear of Current or Ex-Partner: Not on file    Emotionally Abused: Not on file    Physically Abused: Not on file    Sexually Abused: Not on file   Housing Stability:     Unable to Pay for Housing in the Last Year: Not on file    Number of [place] in the Last Year: Not on file    Unstable Housing in the Last Year: Not on file       No family history on file.     REVIEW OF SYSTEMS:  Review of Systems Constitutional: Negative for chills and fever. HENT: Negative for facial swelling and sore throat. Eyes: Negative for discharge and redness. Respiratory: Negative for chest tightness and wheezing. Cardiovascular: Negative for chest pain and palpitations. Gastrointestinal: Negative for nausea and vomiting. Endocrine: Negative for polyphagia and polyuria. Genitourinary: Positive for dysuria. Negative for decreased urine volume, difficulty urinating, enuresis, flank pain, frequency, genital sores, hematuria, penile discharge, penile pain, penile swelling, scrotal swelling, testicular pain and urgency. Musculoskeletal: Negative for back pain and neck stiffness. Skin: Negative for rash and wound. Neurological: Negative for dizziness and headaches. Hematological: Negative for adenopathy. Does not bruise/bleed easily. Psychiatric/Behavioral: Negative for confusion and hallucinations. PHYSICAL EXAM:  Temp 97.2 °F (36.2 °C) (Temporal)   Ht 5' 6\" (1.676 m)   Wt 143 lb (64.9 kg)   BMI 23.08 kg/m²   Physical Exam  Constitutional:       General: He is not in acute distress. Appearance: Normal appearance. He is well-developed. HENT:      Head: Normocephalic and atraumatic. Nose: Nose normal.   Eyes:      General: No scleral icterus. Conjunctiva/sclera: Conjunctivae normal.      Pupils: Pupils are equal, round, and reactive to light. Neck:      Trachea: No tracheal deviation. Cardiovascular:      Rate and Rhythm: Normal rate and regular rhythm. Pulmonary:      Effort: Pulmonary effort is normal. No respiratory distress. Breath sounds: No stridor. Abdominal:      General: There is no distension. Palpations: Abdomen is soft. There is no mass. Tenderness: There is no abdominal tenderness. Comments: Suprapubic tube in place.   Suprapubic tube site with some slight granulation and mild exudate but otherwise clean   Genitourinary:     Penis: Normal. Testes: Normal.   Musculoskeletal:         General: No tenderness. Normal range of motion. Cervical back: Normal range of motion and neck supple. Lymphadenopathy:      Cervical: No cervical adenopathy. Skin:     General: Skin is warm and dry. Findings: No erythema. Neurological:      Mental Status: He is alert and oriented to person, place, and time. Psychiatric:         Behavior: Behavior normal.         Judgment: Judgment normal.       Cystoscopy Procedure Note    Indications: Diagnosis    Pre-operative Diagnosis: Bladder neck contracture    Post-operative Diagnosis: Same    Surgeon: Antonella Flores MD     Assistants: staff    Anesthesia: Local anesthesia topical 2% lidocaine gel    Procedure Details   The risks, benefits, complications, treatment options, and expected outcomes were discussed with the patient. The patient concurred with the proposed plan, giving informed consent. Cystoscopy was performed today under local anesthesia, using sterile technique. The patient was placed in the supine position, prepped with Hibiclens, and draped in the usual sterile fashion. A 17 Slovak sheath flexible cystoscope was used to inspect both the urethra and bladder using the flexible scope. Findings:  Anterior urethra: normal without strictures and without scarring. Prostate:  Prostatic urethra: Well resected prostatic fossa however there is near complete occlusion of the bladder neck again there is a small opening but I could not pass the scope through this. Consistent with recurrent bladder neck contracture                            Complications:  None; patient tolerated the procedure well. Disposition: To home after observation.            Condition: stable            DATA:  BMP:    Lab Results   Component Value Date     11/15/2021    K 4.2 11/15/2021    K 3.8 09/17/2021     11/15/2021    CO2 25 11/15/2021    BUN 16 11/15/2021    LABALBU 2.8 09/19/2021    CREATININE 0.7 11/15/2021    CALCIUM 9.2 11/15/2021    GFRAA >59 11/15/2021    LABGLOM >60 11/15/2021    GLUCOSE 147 11/15/2021     Results for orders placed or performed in visit on 11/15/21   POCT Urinalysis Dipstick w/ Micro (Auto)   Result Value Ref Range    Color, UA Yellow     Clarity, UA Clear Clear    Glucose, Ur neg     Bilirubin Urine 0 mg/dL    Ketones, Urine Negative     Specific [place], UA 1.005 1.005 - 1.030    Blood, Urine Positive     pH, UA 6.5 4.5 - 8.0    Protein, UA 1+ (A) Negative    Nitrite, Urine Positive     Leukocytes, UA large     Urobilinogen, Urine Normal     rbc urine, poc 2     wbc urine, poc 5-10     bacteria urine, poc 1+     yeast urine, poc 0     casts urine, poc 0     Epi Cells Urine, POC 0     crystals urine, poc 0      1. Bladder neck contracture  His bladder neck contracture has recurred. His residual today was only 71 so he appears to be emptying however I did not feel we should remove his suprapubic tube in fact I think he needs go back to the OR for a more aggressive transurethral resection of bladder neck contracture. He may need a referral for consideration of a UroLume stent. Risk and complication were discussed with him including the risk of recurrence needing to continue a suprapubic tube. We will plan to change his suprapubic tube at the same time  - Cystoscopy  - NV Measure, post-void residual, US, non-imaging  - POCT Urinalysis Dipstick w/ Micro (Auto)    2. Dysuria  Not sure where the dysuria is from the contracture whether he has infection or just chronic bacteriuria from an chronic indwelling suprapubic tube. We will send urine for culture and empirically begin Levaquin for 10 days that we we can cover him with antibiotics pre and perioperatively  - Culture, Urine  - levoFLOXacin (LEVAQUIN) 500 MG tablet; Take 1 tablet by mouth daily for 10 days  Dispense: 10 tablet;  Refill: 0      Orders Placed This Encounter   Procedures    Culture, Urine     Order Specific Question: Specify (ex-cath, midstream, cysto, etc)? Answer:   clean catch    POCT Urinalysis Dipstick w/ Micro (Auto)    MD Measure, post-void residual, US, non-imaging        Return for PT to be scheduled for Surgery. All information inputted into the note by the MA to include chief complaint, past medical history, past surgical history, medications, allergies, social and family history and review of systems has been reviewed and updated as needed by me. EMR Dragon/transcription disclaimer: Much of this documentt is electronic  transcription/translation of spoken language to printed text. The  electronic translation of spoken language may be erroneous, or at times,  nonsensical words or phrases may be inadvertently transcribed.  Although I  have reviewed the document for such errors, some may still exist.

## 2021-11-16 ENCOUNTER — HOSPITAL ENCOUNTER (OUTPATIENT)
Age: 86
Setting detail: OBSERVATION
Discharge: HOME HEALTH | End: 2021-11-17
Attending: UROLOGY | Admitting: UROLOGY
Payer: MEDICARE

## 2021-11-16 ENCOUNTER — ANESTHESIA EVENT (OUTPATIENT)
Dept: OPERATING ROOM | Age: 86
End: 2021-11-16
Payer: MEDICARE

## 2021-11-16 ENCOUNTER — ANESTHESIA (OUTPATIENT)
Dept: OPERATING ROOM | Age: 86
End: 2021-11-16
Payer: MEDICARE

## 2021-11-16 ENCOUNTER — APPOINTMENT (OUTPATIENT)
Dept: GENERAL RADIOLOGY | Age: 86
End: 2021-11-16
Attending: UROLOGY
Payer: MEDICARE

## 2021-11-16 VITALS — OXYGEN SATURATION: 100 % | TEMPERATURE: 95 F | SYSTOLIC BLOOD PRESSURE: 147 MMHG | DIASTOLIC BLOOD PRESSURE: 78 MMHG

## 2021-11-16 LAB
GLUCOSE BLD-MCNC: 134 MG/DL (ref 70–99)
PERFORMED ON: ABNORMAL

## 2021-11-16 PROCEDURE — 3700000000 HC ANESTHESIA ATTENDED CARE: Performed by: UROLOGY

## 2021-11-16 PROCEDURE — 2500000003 HC RX 250 WO HCPCS

## 2021-11-16 PROCEDURE — 96375 TX/PRO/DX INJ NEW DRUG ADDON: CPT

## 2021-11-16 PROCEDURE — 82947 ASSAY GLUCOSE BLOOD QUANT: CPT

## 2021-11-16 PROCEDURE — 6360000002 HC RX W HCPCS: Performed by: UROLOGY

## 2021-11-16 PROCEDURE — 52640 RELIEVE BLADDER CONTRACTURE: CPT | Performed by: UROLOGY

## 2021-11-16 PROCEDURE — 51710 CHANGE OF BLADDER TUBE: CPT | Performed by: UROLOGY

## 2021-11-16 PROCEDURE — 88307 TISSUE EXAM BY PATHOLOGIST: CPT

## 2021-11-16 PROCEDURE — 3700000001 HC ADD 15 MINUTES (ANESTHESIA): Performed by: UROLOGY

## 2021-11-16 PROCEDURE — 6360000002 HC RX W HCPCS

## 2021-11-16 PROCEDURE — G0378 HOSPITAL OBSERVATION PER HR: HCPCS

## 2021-11-16 PROCEDURE — 6360000002 HC RX W HCPCS: Performed by: ANESTHESIOLOGY

## 2021-11-16 PROCEDURE — 3600000004 HC SURGERY LEVEL 4 BASE: Performed by: UROLOGY

## 2021-11-16 PROCEDURE — 7100000001 HC PACU RECOVERY - ADDTL 15 MIN: Performed by: UROLOGY

## 2021-11-16 PROCEDURE — 2580000003 HC RX 258

## 2021-11-16 PROCEDURE — 2709999900 HC NON-CHARGEABLE SUPPLY: Performed by: UROLOGY

## 2021-11-16 PROCEDURE — 96376 TX/PRO/DX INJ SAME DRUG ADON: CPT

## 2021-11-16 PROCEDURE — 2580000003 HC RX 258: Performed by: UROLOGY

## 2021-11-16 PROCEDURE — C1894 INTRO/SHEATH, NON-LASER: HCPCS | Performed by: UROLOGY

## 2021-11-16 PROCEDURE — C1769 GUIDE WIRE: HCPCS | Performed by: UROLOGY

## 2021-11-16 PROCEDURE — 2720000010 HC SURG SUPPLY STERILE: Performed by: UROLOGY

## 2021-11-16 PROCEDURE — 2580000003 HC RX 258: Performed by: ANESTHESIOLOGY

## 2021-11-16 PROCEDURE — 7100000000 HC PACU RECOVERY - FIRST 15 MIN: Performed by: UROLOGY

## 2021-11-16 PROCEDURE — 3600000014 HC SURGERY LEVEL 4 ADDTL 15MIN: Performed by: UROLOGY

## 2021-11-16 PROCEDURE — 6370000000 HC RX 637 (ALT 250 FOR IP): Performed by: UROLOGY

## 2021-11-16 RX ORDER — VITS A,C,E/LUTEIN/MINERALS 300MCG-200
1 TABLET ORAL DAILY
Status: DISCONTINUED | OUTPATIENT
Start: 2021-11-16 | End: 2021-11-17 | Stop reason: HOSPADM

## 2021-11-16 RX ORDER — ONDANSETRON 2 MG/ML
4 INJECTION INTRAMUSCULAR; INTRAVENOUS
Status: DISCONTINUED | OUTPATIENT
Start: 2021-11-16 | End: 2021-11-16 | Stop reason: HOSPADM

## 2021-11-16 RX ORDER — PROCHLORPERAZINE EDISYLATE 5 MG/ML
5 INJECTION INTRAMUSCULAR; INTRAVENOUS
Status: DISCONTINUED | OUTPATIENT
Start: 2021-11-16 | End: 2021-11-16 | Stop reason: HOSPADM

## 2021-11-16 RX ORDER — ROCURONIUM BROMIDE 10 MG/ML
INJECTION, SOLUTION INTRAVENOUS PRN
Status: DISCONTINUED | OUTPATIENT
Start: 2021-11-16 | End: 2021-11-16 | Stop reason: SDUPTHER

## 2021-11-16 RX ORDER — FENTANYL CITRATE 50 UG/ML
50 INJECTION, SOLUTION INTRAMUSCULAR; INTRAVENOUS EVERY 5 MIN PRN
Status: DISCONTINUED | OUTPATIENT
Start: 2021-11-16 | End: 2021-11-16 | Stop reason: HOSPADM

## 2021-11-16 RX ORDER — MEMANTINE HYDROCHLORIDE 5 MG/1
10 TABLET ORAL 2 TIMES DAILY
Status: DISCONTINUED | OUTPATIENT
Start: 2021-11-16 | End: 2021-11-17 | Stop reason: HOSPADM

## 2021-11-16 RX ORDER — DONEPEZIL HYDROCHLORIDE 5 MG/1
10 TABLET, FILM COATED ORAL NIGHTLY
Status: DISCONTINUED | OUTPATIENT
Start: 2021-11-16 | End: 2021-11-17 | Stop reason: HOSPADM

## 2021-11-16 RX ORDER — HYDRALAZINE HYDROCHLORIDE 20 MG/ML
5 INJECTION INTRAMUSCULAR; INTRAVENOUS EVERY 10 MIN PRN
Status: DISCONTINUED | OUTPATIENT
Start: 2021-11-16 | End: 2021-11-16 | Stop reason: HOSPADM

## 2021-11-16 RX ORDER — SODIUM CHLORIDE 9 MG/ML
INJECTION, SOLUTION INTRAVENOUS CONTINUOUS
Status: DISCONTINUED | OUTPATIENT
Start: 2021-11-16 | End: 2021-11-16

## 2021-11-16 RX ORDER — FENTANYL CITRATE 50 UG/ML
INJECTION, SOLUTION INTRAMUSCULAR; INTRAVENOUS PRN
Status: DISCONTINUED | OUTPATIENT
Start: 2021-11-16 | End: 2021-11-16 | Stop reason: SDUPTHER

## 2021-11-16 RX ORDER — HYDROCODONE BITARTRATE AND ACETAMINOPHEN 5; 325 MG/1; MG/1
1 TABLET ORAL EVERY 4 HOURS PRN
Status: DISCONTINUED | OUTPATIENT
Start: 2021-11-16 | End: 2021-11-17 | Stop reason: HOSPADM

## 2021-11-16 RX ORDER — MIDAZOLAM HYDROCHLORIDE 1 MG/ML
2 INJECTION INTRAMUSCULAR; INTRAVENOUS
Status: DISCONTINUED | OUTPATIENT
Start: 2021-11-16 | End: 2021-11-16 | Stop reason: HOSPADM

## 2021-11-16 RX ORDER — SODIUM CHLORIDE 9 MG/ML
25 INJECTION, SOLUTION INTRAVENOUS PRN
Status: DISCONTINUED | OUTPATIENT
Start: 2021-11-16 | End: 2021-11-17 | Stop reason: HOSPADM

## 2021-11-16 RX ORDER — BISACODYL 10 MG
10 SUPPOSITORY, RECTAL RECTAL DAILY PRN
Status: DISCONTINUED | OUTPATIENT
Start: 2021-11-16 | End: 2021-11-17 | Stop reason: HOSPADM

## 2021-11-16 RX ORDER — SODIUM CHLORIDE 9 MG/ML
25 INJECTION, SOLUTION INTRAVENOUS PRN
Status: DISCONTINUED | OUTPATIENT
Start: 2021-11-16 | End: 2021-11-16 | Stop reason: HOSPADM

## 2021-11-16 RX ORDER — DIPHENHYDRAMINE HYDROCHLORIDE 50 MG/ML
12.5 INJECTION INTRAMUSCULAR; INTRAVENOUS
Status: DISCONTINUED | OUTPATIENT
Start: 2021-11-16 | End: 2021-11-16 | Stop reason: HOSPADM

## 2021-11-16 RX ORDER — FENTANYL CITRATE 50 UG/ML
25 INJECTION, SOLUTION INTRAMUSCULAR; INTRAVENOUS
Status: DISCONTINUED | OUTPATIENT
Start: 2021-11-16 | End: 2021-11-16 | Stop reason: HOSPADM

## 2021-11-16 RX ORDER — LABETALOL HYDROCHLORIDE 5 MG/ML
5 INJECTION, SOLUTION INTRAVENOUS EVERY 10 MIN PRN
Status: DISCONTINUED | OUTPATIENT
Start: 2021-11-16 | End: 2021-11-16 | Stop reason: HOSPADM

## 2021-11-16 RX ORDER — LIDOCAINE HYDROCHLORIDE 10 MG/ML
INJECTION, SOLUTION EPIDURAL; INFILTRATION; INTRACAUDAL; PERINEURAL PRN
Status: DISCONTINUED | OUTPATIENT
Start: 2021-11-16 | End: 2021-11-16 | Stop reason: SDUPTHER

## 2021-11-16 RX ORDER — LISINOPRIL 5 MG/1
5 TABLET ORAL DAILY
Status: DISCONTINUED | OUTPATIENT
Start: 2021-11-17 | End: 2021-11-17 | Stop reason: HOSPADM

## 2021-11-16 RX ORDER — HYDROMORPHONE HYDROCHLORIDE 1 MG/ML
0.5 INJECTION, SOLUTION INTRAMUSCULAR; INTRAVENOUS; SUBCUTANEOUS EVERY 5 MIN PRN
Status: DISCONTINUED | OUTPATIENT
Start: 2021-11-16 | End: 2021-11-16 | Stop reason: HOSPADM

## 2021-11-16 RX ORDER — SODIUM CHLORIDE 0.9 % (FLUSH) 0.9 %
5-40 SYRINGE (ML) INJECTION EVERY 12 HOURS SCHEDULED
Status: DISCONTINUED | OUTPATIENT
Start: 2021-11-16 | End: 2021-11-16 | Stop reason: HOSPADM

## 2021-11-16 RX ORDER — ONDANSETRON 2 MG/ML
4 INJECTION INTRAMUSCULAR; INTRAVENOUS EVERY 6 HOURS PRN
Status: DISCONTINUED | OUTPATIENT
Start: 2021-11-16 | End: 2021-11-17 | Stop reason: HOSPADM

## 2021-11-16 RX ORDER — LANOLIN ALCOHOL/MO/W.PET/CERES
3 CREAM (GRAM) TOPICAL NIGHTLY PRN
Status: DISCONTINUED | OUTPATIENT
Start: 2021-11-16 | End: 2021-11-17 | Stop reason: HOSPADM

## 2021-11-16 RX ORDER — CIPROFLOXACIN 2 MG/ML
400 INJECTION, SOLUTION INTRAVENOUS EVERY 12 HOURS
Status: DISCONTINUED | OUTPATIENT
Start: 2021-11-17 | End: 2021-11-17 | Stop reason: HOSPADM

## 2021-11-16 RX ORDER — HYDROCODONE BITARTRATE AND ACETAMINOPHEN 5; 325 MG/1; MG/1
2 TABLET ORAL EVERY 4 HOURS PRN
Status: DISCONTINUED | OUTPATIENT
Start: 2021-11-16 | End: 2021-11-17 | Stop reason: HOSPADM

## 2021-11-16 RX ORDER — VERAPAMIL HYDROCHLORIDE 120 MG/1
120 TABLET, FILM COATED ORAL 2 TIMES DAILY
Status: DISCONTINUED | OUTPATIENT
Start: 2021-11-16 | End: 2021-11-17 | Stop reason: HOSPADM

## 2021-11-16 RX ORDER — PANTOPRAZOLE SODIUM 40 MG/1
40 TABLET, DELAYED RELEASE ORAL
Status: DISCONTINUED | OUTPATIENT
Start: 2021-11-17 | End: 2021-11-17 | Stop reason: HOSPADM

## 2021-11-16 RX ORDER — POLYETHYLENE GLYCOL 3350 17 G/17G
17 POWDER, FOR SOLUTION ORAL DAILY PRN
Status: DISCONTINUED | OUTPATIENT
Start: 2021-11-16 | End: 2021-11-17 | Stop reason: HOSPADM

## 2021-11-16 RX ORDER — ENALAPRILAT 2.5 MG/2ML
1.25 INJECTION INTRAVENOUS
Status: DISCONTINUED | OUTPATIENT
Start: 2021-11-16 | End: 2021-11-16 | Stop reason: HOSPADM

## 2021-11-16 RX ORDER — ACETAMINOPHEN 500 MG
1000 TABLET ORAL EVERY 6 HOURS PRN
Status: DISCONTINUED | OUTPATIENT
Start: 2021-11-16 | End: 2021-11-17 | Stop reason: HOSPADM

## 2021-11-16 RX ORDER — ONDANSETRON 4 MG/1
4 TABLET, ORALLY DISINTEGRATING ORAL EVERY 8 HOURS PRN
Status: DISCONTINUED | OUTPATIENT
Start: 2021-11-16 | End: 2021-11-17 | Stop reason: HOSPADM

## 2021-11-16 RX ORDER — CARBOXYMETHYLCELLULOSE SODIUM 5 MG/ML
2 SOLUTION/ DROPS OPHTHALMIC 2 TIMES DAILY
Status: DISCONTINUED | OUTPATIENT
Start: 2021-11-16 | End: 2021-11-17 | Stop reason: HOSPADM

## 2021-11-16 RX ORDER — CIPROFLOXACIN 2 MG/ML
400 INJECTION, SOLUTION INTRAVENOUS
Status: COMPLETED | OUTPATIENT
Start: 2021-11-16 | End: 2021-11-16

## 2021-11-16 RX ORDER — SODIUM CHLORIDE, SODIUM LACTATE, POTASSIUM CHLORIDE, CALCIUM CHLORIDE 600; 310; 30; 20 MG/100ML; MG/100ML; MG/100ML; MG/100ML
INJECTION, SOLUTION INTRAVENOUS CONTINUOUS
Status: DISCONTINUED | OUTPATIENT
Start: 2021-11-16 | End: 2021-11-16

## 2021-11-16 RX ORDER — SODIUM CHLORIDE, SODIUM LACTATE, POTASSIUM CHLORIDE, CALCIUM CHLORIDE 600; 310; 30; 20 MG/100ML; MG/100ML; MG/100ML; MG/100ML
INJECTION, SOLUTION INTRAVENOUS CONTINUOUS PRN
Status: DISCONTINUED | OUTPATIENT
Start: 2021-11-16 | End: 2021-11-16 | Stop reason: SDUPTHER

## 2021-11-16 RX ORDER — SODIUM CHLORIDE 0.9 % (FLUSH) 0.9 %
5-40 SYRINGE (ML) INJECTION EVERY 12 HOURS SCHEDULED
Status: DISCONTINUED | OUTPATIENT
Start: 2021-11-16 | End: 2021-11-17 | Stop reason: HOSPADM

## 2021-11-16 RX ORDER — FENTANYL CITRATE 50 UG/ML
25 INJECTION, SOLUTION INTRAMUSCULAR; INTRAVENOUS EVERY 5 MIN PRN
Status: DISCONTINUED | OUTPATIENT
Start: 2021-11-16 | End: 2021-11-16 | Stop reason: HOSPADM

## 2021-11-16 RX ORDER — MIRTAZAPINE 15 MG/1
15 TABLET, FILM COATED ORAL NIGHTLY
Status: DISCONTINUED | OUTPATIENT
Start: 2021-11-16 | End: 2021-11-17 | Stop reason: HOSPADM

## 2021-11-16 RX ORDER — LIDOCAINE HYDROCHLORIDE 10 MG/ML
1 INJECTION, SOLUTION EPIDURAL; INFILTRATION; INTRACAUDAL; PERINEURAL
Status: DISCONTINUED | OUTPATIENT
Start: 2021-11-16 | End: 2021-11-16 | Stop reason: HOSPADM

## 2021-11-16 RX ORDER — SODIUM CHLORIDE 0.9 % (FLUSH) 0.9 %
5-40 SYRINGE (ML) INJECTION PRN
Status: DISCONTINUED | OUTPATIENT
Start: 2021-11-16 | End: 2021-11-16 | Stop reason: HOSPADM

## 2021-11-16 RX ORDER — MEPERIDINE HYDROCHLORIDE 25 MG/ML
12.5 INJECTION INTRAMUSCULAR; INTRAVENOUS; SUBCUTANEOUS EVERY 5 MIN PRN
Status: DISCONTINUED | OUTPATIENT
Start: 2021-11-16 | End: 2021-11-16 | Stop reason: HOSPADM

## 2021-11-16 RX ORDER — SODIUM CHLORIDE 9 MG/ML
INJECTION, SOLUTION INTRAVENOUS CONTINUOUS
Status: DISCONTINUED | OUTPATIENT
Start: 2021-11-16 | End: 2021-11-17 | Stop reason: HOSPADM

## 2021-11-16 RX ORDER — ATORVASTATIN CALCIUM 20 MG/1
20 TABLET, FILM COATED ORAL DAILY
Status: DISCONTINUED | OUTPATIENT
Start: 2021-11-16 | End: 2021-11-17 | Stop reason: HOSPADM

## 2021-11-16 RX ORDER — HYDROMORPHONE HYDROCHLORIDE 1 MG/ML
0.25 INJECTION, SOLUTION INTRAMUSCULAR; INTRAVENOUS; SUBCUTANEOUS EVERY 5 MIN PRN
Status: DISCONTINUED | OUTPATIENT
Start: 2021-11-16 | End: 2021-11-16 | Stop reason: HOSPADM

## 2021-11-16 RX ORDER — MORPHINE SULFATE 2 MG/ML
2 INJECTION, SOLUTION INTRAMUSCULAR; INTRAVENOUS
Status: DISCONTINUED | OUTPATIENT
Start: 2021-11-16 | End: 2021-11-17 | Stop reason: HOSPADM

## 2021-11-16 RX ORDER — SODIUM CHLORIDE 0.9 % (FLUSH) 0.9 %
5-40 SYRINGE (ML) INJECTION PRN
Status: DISCONTINUED | OUTPATIENT
Start: 2021-11-16 | End: 2021-11-17 | Stop reason: HOSPADM

## 2021-11-16 RX ORDER — PROPOFOL 10 MG/ML
INJECTION, EMULSION INTRAVENOUS PRN
Status: DISCONTINUED | OUTPATIENT
Start: 2021-11-16 | End: 2021-11-16 | Stop reason: SDUPTHER

## 2021-11-16 RX ORDER — FENTANYL CITRATE 50 UG/ML
50 INJECTION, SOLUTION INTRAMUSCULAR; INTRAVENOUS
Status: DISCONTINUED | OUTPATIENT
Start: 2021-11-16 | End: 2021-11-16 | Stop reason: HOSPADM

## 2021-11-16 RX ADMIN — PROPOFOL 100 MG: 10 INJECTION, EMULSION INTRAVENOUS at 17:43

## 2021-11-16 RX ADMIN — MEMANTINE HYDROCHLORIDE 10 MG: 5 TABLET ORAL at 21:00

## 2021-11-16 RX ADMIN — VERAPAMIL HYDROCHLORIDE 120 MG: 120 TABLET, FILM COATED ORAL at 21:00

## 2021-11-16 RX ADMIN — MIRTAZAPINE 15 MG: 15 TABLET, FILM COATED ORAL at 21:00

## 2021-11-16 RX ADMIN — FENTANYL CITRATE 25 MCG: 50 INJECTION, SOLUTION INTRAMUSCULAR; INTRAVENOUS at 18:06

## 2021-11-16 RX ADMIN — LIDOCAINE HYDROCHLORIDE 50 MG: 10 INJECTION, SOLUTION EPIDURAL; INFILTRATION; INTRACAUDAL; PERINEURAL at 17:43

## 2021-11-16 RX ADMIN — HYDROMORPHONE HYDROCHLORIDE 0.5 MG: 1 INJECTION, SOLUTION INTRAMUSCULAR; INTRAVENOUS; SUBCUTANEOUS at 19:02

## 2021-11-16 RX ADMIN — ROCURONIUM BROMIDE 50 MG: 10 INJECTION, SOLUTION INTRAVENOUS at 17:43

## 2021-11-16 RX ADMIN — SODIUM CHLORIDE: 9 INJECTION, SOLUTION INTRAVENOUS at 21:00

## 2021-11-16 RX ADMIN — FENTANYL CITRATE 25 MCG: 50 INJECTION, SOLUTION INTRAMUSCULAR; INTRAVENOUS at 17:42

## 2021-11-16 RX ADMIN — FENTANYL CITRATE 25 MCG: 50 INJECTION, SOLUTION INTRAMUSCULAR; INTRAVENOUS at 18:19

## 2021-11-16 RX ADMIN — SODIUM CHLORIDE, PRESERVATIVE FREE 10 ML: 5 INJECTION INTRAVENOUS at 21:00

## 2021-11-16 RX ADMIN — HYDROMORPHONE HYDROCHLORIDE 0.5 MG: 1 INJECTION, SOLUTION INTRAMUSCULAR; INTRAVENOUS; SUBCUTANEOUS at 18:50

## 2021-11-16 RX ADMIN — CIPROFLOXACIN 400 MG: 2 INJECTION, SOLUTION INTRAVENOUS at 17:40

## 2021-11-16 RX ADMIN — SODIUM CHLORIDE, SODIUM LACTATE, POTASSIUM CHLORIDE, AND CALCIUM CHLORIDE: 600; 310; 30; 20 INJECTION, SOLUTION INTRAVENOUS at 17:36

## 2021-11-16 RX ADMIN — Medication 3 MG: at 21:00

## 2021-11-16 RX ADMIN — PROPOFOL 20 MG: 10 INJECTION, EMULSION INTRAVENOUS at 17:55

## 2021-11-16 RX ADMIN — SODIUM CHLORIDE, POTASSIUM CHLORIDE, SODIUM LACTATE AND CALCIUM CHLORIDE: 600; 310; 30; 20 INJECTION, SOLUTION INTRAVENOUS at 14:41

## 2021-11-16 RX ADMIN — CARBOXYMETHYLCELLULOSE SODIUM 2 DROP: 5 SOLUTION/ DROPS OPHTHALMIC at 21:00

## 2021-11-16 RX ADMIN — DONEPEZIL HYDROCHLORIDE 10 MG: 5 TABLET, FILM COATED ORAL at 21:00

## 2021-11-16 RX ADMIN — SUGAMMADEX 400 MG: 100 INJECTION, SOLUTION INTRAVENOUS at 18:29

## 2021-11-16 ASSESSMENT — PAIN SCALES - GENERAL
PAINLEVEL_OUTOF10: 10
PAINLEVEL_OUTOF10: 10
PAINLEVEL_OUTOF10: 5
PAINLEVEL_OUTOF10: 3

## 2021-11-16 ASSESSMENT — PAIN DESCRIPTION - LOCATION
LOCATION: PENIS
LOCATION: GENERALIZED

## 2021-11-16 ASSESSMENT — ENCOUNTER SYMPTOMS: SHORTNESS OF BREATH: 0

## 2021-11-16 ASSESSMENT — PAIN DESCRIPTION - DESCRIPTORS: DESCRIPTORS: BURNING

## 2021-11-16 ASSESSMENT — LIFESTYLE VARIABLES: SMOKING_STATUS: 0

## 2021-11-16 NOTE — H&P
Aurelia Juarez is a 80 y.o. male who presents today        Chief Complaint   Patient presents with    Cystoscopy       I am here today for 1 month cysto.         Urinary retention  Patient is a male who presents the clinic today for follow-up. [facility] underwent transurethral incision of bladder neck contracture.  He originally underwent TURP due to BPH with urinary retention on 4/14/2021. [facility] came in on 8/24/2021 with inability to void.  Residuals greater than 400 mL with multiple attempts which were unsuccessful with catheter.  Cystoscopy in office revealed complete occlusion of bladder neck consistent with bladder neck contracture.  He underwent suprapubic catheter placement on 8/24/2021. [facility] then underwent transurethral incision of bladder neck contracture on 9/17/2021.       Patient seen by BRYAN Pastrana in urology on 8/9/2021. [facility] was status post transurethral incision of bladder neck contracture.  His suprapubic tube remains in place and plugged.  His urethral catheter was removed on that date. [facility] now comes in for follow-up to see how he is voiding after removal of the Harper catheter.  He was supposed to be checking residuals to the suprapubic catheter but apparently has not been doing so though he reports he is voiding a good amount feels like he is able to relieve himself and get his bladder empty.       He was last seen on 10/14/2021 at that time we will change his suprapubic tube. The plan was to keep the suprapubic tube plugged he was to check his residuals as above and follow-up to see me today for cystoscopy of his bladder neck was open we will remove the suprapubic tube.   He is complaining of some dysuria today        Past Medical History        Past Medical History:   Diagnosis Date    Arthritis      Bladder neck contracture      Diabetes mellitus ([address]      History of blood transfusion      Hyperlipidemia      Hypertension      Immunization due      Neuropathy      Palliative care patient 09/20/2021            Past Surgical History         Past Surgical History:   Procedure Laterality Date    COLONOSCOPY        CYSTOSCOPY N/A 8/24/2021     SUPRAPUBIC TUBE PLACEMENT performed by Beto Louie MD at [address] [place] [address] N/A 9/16/2021     CYSTOSCOPY TRANSURETHRAL inison BLADDER Bladder neck contracture ; exchange supra pubic catheter performed by Beto Louie MD at [address] [place] [address] ENDOSCOPY, COLON, DIAGNOSTIC        EYE SURGERY Bilateral       cataract    NOSE SURGERY        SKIN BIOPSY        TONSILLECTOMY        TURP N/A 4/14/2021     TRANSURETHRAL RESECTION PROSTATE performed by Beot Louie MD at [facility] OR            Current Facility-Administered Medications          Current Outpatient Medications   Medication Sig Dispense Refill    aspirin 81 MG EC tablet Take 81 mg by mouth daily        lisinopril (PRINIVIL;ZESTRIL) 5 MG tablet Take 5 mg by mouth daily        memantine (NAMENDA) 10 MG tablet Take 10 mg by mouth 2 times daily        vitamin E 400 UNIT capsule Take 400 Units by mouth daily        levoFLOXacin (LEVAQUIN) 500 MG tablet Take 1 tablet by mouth daily for 10 days 10 tablet 0    omeprazole (PRILOSEC) 20 MG delayed release capsule Take 20 mg by mouth daily         mirtazapine (REMERON) 30 MG tablet Take 15 mg by mouth nightly         atorvastatin (LIPITOR) 20 MG tablet Take 20 mg by mouth daily Take 1/2 tab at bedtime.         verapamil (CALAN) 120 MG tablet Take 120 mg by mouth 2 times daily         donepezil (ARICEPT) 10 MG tablet Take 10 mg by mouth nightly         melatonin 3 MG TABS tablet Take 3 mg by mouth nightly as needed Take 2 hs prn         metFORMIN (GLUCOPHAGE) 1000 MG tablet Take 500 mg by mouth 2 times daily (with meals)         hypromellose (ISOPTO TEARS) 0.5 % ophthalmic solution Place 2 drops into both eyes 2 times daily        Cranberry 500 MG TABS Take 1 tablet by mouth daily        Multiple Vitamins-Minerals (MULTIVITAMIN MEN 50+) TABS Take 1 tablet by mouth daily          No current facility-administered medications for this visit.                  Allergies   Allergen Reactions    Pcn [Penicillins] Hives       \"Huge dose of PCN\"         Social History   Social History            Socioeconomic History    Marital status:        Spouse name: None    Number of children: 0    Years of education: None    Highest education level: None   Occupational History    None   Tobacco Use    Smoking status: Former Smoker       Types: Cigars       Quit date: 2000       Years since quittin.8    Smokeless tobacco: Never Used   Vaping Use    Vaping Use: Never used   Substance and Sexual Activity    Alcohol use: Not Currently    Drug use: Never    Sexual activity: None   Other Topics Concern    None   Social History Narrative    None      Social Determinants of Health          Financial Resource Strain:     Difficulty of Paying Living Expenses: Not on file   Food Insecurity:     Worried About Running Out of Food in the Last Year: Not on file    [place] of Food in the Last Year: Not on file   Transportation Needs:     Lack of Transportation (Medical): Not on file    Lack of Transportation (Non-Medical):  Not on file   Physical Activity:     Days of Exercise per Week: Not on file    Minutes of Exercise per Session: Not on file   Stress:     Feeling of Stress : Not on file   Social Connections:     Frequency of Communication with Friends and Family: Not on file    Frequency of Social Gatherings with Friends and Family: Not on file    Attends Alevism Services: Not on file    Active Member of Clubs or Organizations: Not on file    Attends Club or Organization Meetings: Not on file    Marital Status: Not on file   Intimate Partner Violence:     Fear of Current or Ex-Partner: Not on file    Emotionally Abused: Not on file    Physically Abused: Not on file    Sexually Abused: Not on file   Housing Stability:     Unable to Pay for Housing in the Last Year: Not on file    Number of Places Lived in the Last Year: Not on file    Unstable Housing in the Last Year: Not on file            Family History   No family history on file.        REVIEW OF SYSTEMS:  Review of Systems   Constitutional: Negative for chills and fever. HENT: Negative for facial swelling and sore throat. Eyes: Negative for discharge and redness. Respiratory: Negative for chest tightness and wheezing. Cardiovascular: Negative for chest pain and palpitations. Gastrointestinal: Negative for nausea and vomiting. Endocrine: Negative for polyphagia and polyuria. Genitourinary: Positive for dysuria. Negative for decreased urine volume, difficulty urinating, enuresis, flank pain, frequency, genital sores, hematuria, penile discharge, penile pain, penile swelling, scrotal swelling, testicular pain and urgency. Musculoskeletal: Negative for back pain and neck stiffness. Skin: Negative for rash and wound. Neurological: Negative for dizziness and headaches. Hematological: Negative for adenopathy. Does not bruise/bleed easily. Psychiatric/Behavioral: Negative for confusion and hallucinations.         PHYSICAL EXAM:  Temp 97.2 °F (36.2 °C) (Temporal)   Ht 5' 6\" (1.676 m)   Wt 143 lb (64.9 kg)   BMI 23.08 kg/m²   Physical Exam  Constitutional:       General: He is not in acute distress. Appearance: Normal appearance. He is well-developed. HENT:      Head: Normocephalic and atraumatic. Nose: Nose normal.   Eyes:      General: No scleral icterus. Conjunctiva/sclera: Conjunctivae normal.      Pupils: Pupils are equal, round, and reactive to light. Neck:      Trachea: No tracheal deviation. Cardiovascular:      Rate and Rhythm: Normal rate and regular rhythm. Pulmonary:      Effort: Pulmonary effort is normal. No respiratory distress. Breath sounds: No stridor. Abdominal:      General: There is no distension.       Palpations: Abdomen is soft. There is no mass. Tenderness: There is no abdominal tenderness. Comments: Suprapubic tube in place. Suprapubic tube site with some slight granulation and mild exudate but otherwise clean   Genitourinary:     Penis: Normal.       Testes: Normal.   Musculoskeletal:         General: No tenderness. Normal range of motion. Cervical back: Normal range of motion and neck supple. Lymphadenopathy:      Cervical: No cervical adenopathy. Skin:     General: Skin is warm and dry. Findings: No erythema. Neurological:      Mental Status: He is alert and oriented to person, place, and time. Psychiatric:         Behavior: Behavior normal.         Judgment: Judgment normal.         Cystoscopy Procedure Note     Indications: Diagnosis    Pre-operative Diagnosis: Bladder neck contracture     Post-operative Diagnosis: Same     Surgeon: Alix Oneil MD      Assistants: staff     Anesthesia: Local anesthesia topical 2% lidocaine gel     Procedure Details   The risks, benefits, complications, treatment options, and expected outcomes were discussed with the patient. The patient concurred with the proposed plan, giving informed consent.     Cystoscopy was performed today under local anesthesia, using sterile technique. The patient was placed in the supine position, prepped with Hibiclens, and draped in the usual sterile fashion. A 17 Mohawk sheath flexible cystoscope was used to inspect both the urethra and bladder using the flexible scope.     Findings:  Anterior urethra: normal without strictures and without scarring. Prostate:  Prostatic urethra: Well resected prostatic fossa however there is near complete occlusion of the bladder neck again there is a small opening but I could not pass the scope through this. Consistent with recurrent bladder neck contracture                             Complications:  None; patient tolerated the procedure well.            Disposition: To home after observation. Condition: stable                 DATA:  BMP:          Lab Results   Component Value Date      11/15/2021     K 4.2 11/15/2021     K 3.8 09/17/2021      11/15/2021     CO2 25 11/15/2021     BUN 16 11/15/2021     LABALBU 2.8 09/19/2021     CREATININE 0.7 11/15/2021     CALCIUM 9.2 11/15/2021     GFRAA >59 11/15/2021     LABGLOM >60 11/15/2021     GLUCOSE 147 11/15/2021            Results for orders placed or performed in visit on 11/15/21   POCT Urinalysis Dipstick w/ Micro (Auto)   Result Value Ref Range     Color, UA Yellow       Clarity, UA Clear Clear     Glucose, Ur neg       Bilirubin Urine 0 mg/dL     Ketones, Urine Negative       Specific Gravity, UA 1.005 1.005 - 1.030     Blood, Urine Positive       pH, UA 6.5 4.5 - 8.0     Protein, UA 1+ (A) Negative     Nitrite, Urine Positive       Leukocytes, UA large       Urobilinogen, Urine Normal       rbc urine, poc 2       wbc urine, poc 5-10       bacteria urine, poc 1+       yeast urine, poc 0       casts urine, poc 0       Epi Cells Urine, POC 0       crystals urine, poc 0        1. Bladder neck contracture  His bladder neck contracture has recurred. His residual today was only 71 so he appears to be emptying however I did not feel we should remove his suprapubic tube in fact I think he needs go back to the OR for a more aggressive transurethral resection of bladder neck contracture. He may need a referral for consideration of a UroLume stent. Risk and complication were discussed with him including the risk of recurrence needing to continue a suprapubic tube. We will plan to change his suprapubic tube at the same time  - Cystoscopy  - KS Measure, post-void residual, US, non-imaging  - POCT Urinalysis Dipstick w/ Micro (Auto)     2. Dysuria  Not sure where the dysuria is from the contracture whether he has infection or just chronic bacteriuria from an chronic indwelling suprapubic tube.   We will send urine for culture and empirically begin Levaquin for 10 days that we we can cover him with antibiotics pre and perioperatively  - Culture, Urine  - levoFLOXacin (LEVAQUIN) 500 MG tablet; Take 1 tablet by mouth daily for 10 days  Dispense: 10 tablet; Refill: 0              Orders Placed This Encounter   Procedures    Culture, Urine       Order Specific Question:   Specify (ex-cath, midstream, cysto, etc)?       Answer:   clean catch    POCT Urinalysis Dipstick w/ Micro (Auto)    KS Measure, post-void residual, US, non-imaging         Return for PT to be scheduled for Surgery.     All information inputted into the note by the MA to include chief complaint, past medical history, past surgical history, medications, allergies, social and family history and review of systems has been reviewed and updated as needed by me.     EMR Dragon/transcription disclaimer: Much of this documentt is electronic  transcription/translation of spoken language to printed text. The  electronic translation of spoken language may be erroneous, or at times,  nonsensical words or phrases may be inadvertently transcribed.  Although I  have reviewed the document for such errors, some may still exist.

## 2021-11-16 NOTE — ANESTHESIA PRE PROCEDURE
mouth 2 times daily (with meals)     Historical Provider, MD       Current medications:    No current outpatient medications on file. No current facility-administered medications for this visit. Allergies: Allergies   Allergen Reactions    Pcn [Penicillins] Hives     \"Huge dose of PCN\"       Problem List:    Patient Active Problem List   Diagnosis Code    Benign prostatic hyperplasia with urinary retention N40.1, R33.8    Bladder neck contracture N32.0    Urinary retention R33.9    Hypotension I95.9    Palliative care patient Z51.5       Past Medical History:        Diagnosis Date    Arthritis     Bladder neck contracture     Diabetes mellitus ([facility] [address]     History of blood transfusion     Hyperlipidemia     Hypertension     Immunization due     Neuropathy     Palliative care patient 2021       Past Surgical History:        Procedure Laterality Date    COLONOSCOPY      CYSTOSCOPY N/A 2021    SUPRAPUBIC TUBE PLACEMENT performed by Bertha Baumann MD at [address] [place] [address] N/A 2021    CYSTOSCOPY TRANSURETHRAL inison BLADDER Bladder neck contracture ; exchange supra pubic catheter performed by Bertha Baumann MD at [address] [place] [address] ENDOSCOPY, COLON, DIAGNOSTIC      EYE SURGERY Bilateral     cataract    NOSE SURGERY      SKIN BIOPSY      TONSILLECTOMY      TURP N/A 2021    TRANSURETHRAL RESECTION PROSTATE performed by [facility] MD at [facility] OR       Social History:    Social History     Tobacco Use    Smoking status: Former Smoker     Types: Cigars     Quit date: 2000     Years since quittin.8    Smokeless tobacco: Never Used   Substance Use Topics    Alcohol use: Not Currently                                Counseling given: Not Answered      Vital Signs (Current): There were no vitals filed for this visit.                                            BP Readings from Last 3 Encounters:   21 (!) 149/74   21 (!) 162/77   21 126/86 NPO Status:                                                                                 BMI:   Wt Readings from Last 3 Encounters:   11/15/21 156 lb (70.8 kg)   11/15/21 143 lb (64.9 kg)   10/14/21 139 lb 14.4 oz (63.5 kg)     There is no height or weight on file to calculate BMI.    CBC:   Lab Results   Component Value Date    WBC 10.3 11/15/2021    RBC 4.31 11/15/2021    HGB 12.3 11/15/2021    HCT 39.3 11/15/2021    MCV 91.2 11/15/2021    RDW 13.2 11/15/2021     11/15/2021       CMP:   Lab Results   Component Value Date     11/15/2021    K 4.2 11/15/2021    K 3.8 09/17/2021     11/15/2021    CO2 25 11/15/2021    BUN 16 11/15/2021    CREATININE 0.7 11/15/2021    GFRAA >59 11/15/2021    LABGLOM >60 11/15/2021    GLUCOSE 147 11/15/2021    PROT 4.5 09/19/2021    CALCIUM 9.2 11/15/2021    BILITOT <0.2 09/19/2021    ALKPHOS 87 09/19/2021    AST 19 09/19/2021    ALT 14 09/19/2021       POC Tests:   No results for input(s): POCGLU, POCNA, POCK, POCCL, POCBUN, POCHEMO, POCHCT in the last 72 hours.     Coags:   Lab Results   Component Value Date    PROTIME 14.5 09/15/2021    INR 1.11 09/15/2021    APTT 25.4 09/15/2021       HCG (If Applicable): No results found for: PREGTESTUR, PREGSERUM, HCG, HCGQUANT     ABGs: No results found for: PHART, PO2ART, FVQ3YZJ, KDQ6UBJ, BEART, Z9EFEHNC     Type & Screen (If Applicable):  No results found for: LABABO, LABRH    Drug/Infectious Status (If Applicable):  No results found for: HIV, HEPCAB    COVID-19 Screening (If Applicable):   Lab Results   Component Value Date    COVID19 Not Detected 11/15/2021    COVID19 Not Detected 09/15/2021           Anesthesia Evaluation  Patient summary reviewed and Nursing notes reviewed no history of anesthetic complications:   Airway: Mallampati: II  TM distance: >3 FB   Neck ROM: full  Mouth opening: > = 3 FB Dental: normal exam         Pulmonary:Negative Pulmonary ROS and normal exam  breath sounds clear to auscultation      (-) shortness of breath and not a current smoker          Patient did not smoke on day of surgery. Cardiovascular:  Exercise tolerance: no interval change,   (+) hypertension:, hyperlipidemia    (-) past MI, CAD, CABG/stent, dysrhythmias,  angina and  CHF    NYHA Classification: I  ECG reviewed  Rhythm: regular  Rate: normal           Beta Blocker:  Not on Beta Blocker      ROS comment: EKG;  55 BPM  Rhythm is now sinus with 1st degree A-V block  Left anterior fascicular block  Possible septal infarct - age undetermined  Low QRS voltages in precordial leads  Comparison Summary: Significant changes including rhythm  Summary: Abnormal ECG     Neuro/Psych:   Negative Neuro/Psych ROS     (-) seizures, CVA and depression/anxiety            GI/Hepatic/Renal:   (+) GERD:, renal disease:,      (-) hiatal hernia       Endo/Other:    (+) DiabetesType II DM, , : arthritis:., .          Pt had PAT visit. Abdominal:       Abdomen: soft. Vascular:     - DVT and PE. Other Findings:               Anesthesia Plan      general     ASA 2     (Pt with severe hypotension following previous procedure. It appears that this happened outside of the surgical environment or pacu.)  Induction: intravenous. BIS  MIPS: Postoperative opioids intended and Prophylactic antiemetics administered. Anesthetic plan and risks discussed with patient. Use of blood products discussed with patient whom. Plan discussed with CRNA.     Attending anesthesiologist reviewed and agrees with Pre Eval content              Cheryle Martinez DO   11/16/2021

## 2021-11-16 NOTE — INTERVAL H&P NOTE
Update History & Physical    The patient's History and Physical of November 15, 2021 was reviewed with the patient and I examined the patient. There was no change. The surgical site was confirmed by the patient and me. Plan: The risks, benefits, expected outcome, and alternative to the recommended procedure have been discussed with the patient. Patient understands and wants to proceed with the procedure.      Electronically signed by Aubrey Tucker MD on 11/16/2021 at 4:57 PM

## 2021-11-17 VITALS
SYSTOLIC BLOOD PRESSURE: 109 MMHG | HEIGHT: 67 IN | OXYGEN SATURATION: 97 % | DIASTOLIC BLOOD PRESSURE: 66 MMHG | TEMPERATURE: 98.2 F | HEART RATE: 79 BPM | WEIGHT: 156 LBS | BODY MASS INDEX: 24.48 KG/M2 | RESPIRATION RATE: 16 BRPM

## 2021-11-17 LAB
ANION GAP SERPL CALCULATED.3IONS-SCNC: 10 MMOL/L (ref 7–19)
BASOPHILS ABSOLUTE: 0 K/UL (ref 0–0.2)
BASOPHILS RELATIVE PERCENT: 0.2 % (ref 0–1)
BUN BLDV-MCNC: 9 MG/DL (ref 8–23)
CALCIUM SERPL-MCNC: 8.7 MG/DL (ref 8.8–10.2)
CHLORIDE BLD-SCNC: 103 MMOL/L (ref 98–111)
CO2: 25 MMOL/L (ref 22–29)
CREAT SERPL-MCNC: 0.6 MG/DL (ref 0.5–1.2)
EOSINOPHILS ABSOLUTE: 0.1 K/UL (ref 0–0.6)
EOSINOPHILS RELATIVE PERCENT: 0.7 % (ref 0–5)
GFR AFRICAN AMERICAN: >59
GFR NON-AFRICAN AMERICAN: >60
GLUCOSE BLD-MCNC: 110 MG/DL (ref 74–109)
HCT VFR BLD CALC: 35.1 % (ref 42–52)
HEMOGLOBIN: 10.9 G/DL (ref 14–18)
IMMATURE GRANULOCYTES #: 0 K/UL
LYMPHOCYTES ABSOLUTE: 1.6 K/UL (ref 1.1–4.5)
LYMPHOCYTES RELATIVE PERCENT: 17.1 % (ref 20–40)
MCH RBC QN AUTO: 28.3 PG (ref 27–31)
MCHC RBC AUTO-ENTMCNC: 31.1 G/DL (ref 33–37)
MCV RBC AUTO: 91.2 FL (ref 80–94)
MONOCYTES ABSOLUTE: 0.7 K/UL (ref 0–0.9)
MONOCYTES RELATIVE PERCENT: 7.3 % (ref 0–10)
NEUTROPHILS ABSOLUTE: 6.9 K/UL (ref 1.5–7.5)
NEUTROPHILS RELATIVE PERCENT: 74.4 % (ref 50–65)
PDW BLD-RTO: 13.1 % (ref 11.5–14.5)
PLATELET # BLD: 207 K/UL (ref 130–400)
PMV BLD AUTO: 10.5 FL (ref 9.4–12.4)
POTASSIUM REFLEX MAGNESIUM: 4 MMOL/L (ref 3.5–5)
RBC # BLD: 3.85 M/UL (ref 4.7–6.1)
SODIUM BLD-SCNC: 138 MMOL/L (ref 136–145)
URINE CULTURE, ROUTINE: NORMAL
WBC # BLD: 9.2 K/UL (ref 4.8–10.8)

## 2021-11-17 PROCEDURE — 80048 BASIC METABOLIC PNL TOTAL CA: CPT

## 2021-11-17 PROCEDURE — 96365 THER/PROPH/DIAG IV INF INIT: CPT

## 2021-11-17 PROCEDURE — 6360000002 HC RX W HCPCS: Performed by: UROLOGY

## 2021-11-17 PROCEDURE — 99024 POSTOP FOLLOW-UP VISIT: CPT | Performed by: NURSE PRACTITIONER

## 2021-11-17 PROCEDURE — 96366 THER/PROPH/DIAG IV INF ADDON: CPT

## 2021-11-17 PROCEDURE — G0378 HOSPITAL OBSERVATION PER HR: HCPCS

## 2021-11-17 PROCEDURE — 36415 COLL VENOUS BLD VENIPUNCTURE: CPT

## 2021-11-17 PROCEDURE — 85025 COMPLETE CBC W/AUTO DIFF WBC: CPT

## 2021-11-17 PROCEDURE — 6370000000 HC RX 637 (ALT 250 FOR IP): Performed by: UROLOGY

## 2021-11-17 RX ADMIN — CIPROFLOXACIN 400 MG: 2 INJECTION, SOLUTION INTRAVENOUS at 04:30

## 2021-11-17 RX ADMIN — CARBOXYMETHYLCELLULOSE SODIUM 2 DROP: 5 SOLUTION/ DROPS OPHTHALMIC at 08:41

## 2021-11-17 RX ADMIN — ATORVASTATIN CALCIUM 20 MG: 20 TABLET, FILM COATED ORAL at 08:41

## 2021-11-17 RX ADMIN — MAGNESIUM HYDROXIDE 30 ML: 400 SUSPENSION ORAL at 10:52

## 2021-11-17 RX ADMIN — LISINOPRIL 5 MG: 5 TABLET ORAL at 08:41

## 2021-11-17 RX ADMIN — MEMANTINE HYDROCHLORIDE 10 MG: 5 TABLET ORAL at 08:41

## 2021-11-17 RX ADMIN — Medication 1 TABLET: at 08:41

## 2021-11-17 RX ADMIN — METFORMIN HYDROCHLORIDE 500 MG: 500 TABLET ORAL at 08:41

## 2021-11-17 RX ADMIN — VERAPAMIL HYDROCHLORIDE 120 MG: 120 TABLET, FILM COATED ORAL at 08:41

## 2021-11-17 RX ADMIN — PANTOPRAZOLE SODIUM 40 MG: 40 TABLET, DELAYED RELEASE ORAL at 05:46

## 2021-11-17 ASSESSMENT — PAIN SCALES - GENERAL: PAINLEVEL_OUTOF10: 0

## 2021-11-17 NOTE — OP NOTE
Brief operative Note      Patient: Malini Trevino  YOB: 1934  MRN: [id number]    Date of Procedure: 11/16/2021    Pre-Op Diagnosis: BLADDER NECK CONTRACTURE    Post-Op Diagnosis: Same       Procedure(s):  CYSTOSCOPY TRANSURETHRAL RESECTION BLADDER NECK contracture, SUPRAPUBIC CATHETER EXCHANGE (16f)    Surgeon(s):  Harman Trinidad MD    Assistant:   * No surgical staff found *    Anesthesia: General    Estimated Blood Loss (mL): 10    Complications: None    Specimens:   ID Type Source Tests Collected by Time Destination   A : Bladder neck contracture Tissue Bladder SURGICAL PATHOLOGY Harman Trinidad MD 11/16/2021 1817        Implants:  * No implants in log *      Drains:   Urethral Catheter Double-lumen 16 fr (Active)       Urethral Catheter Triple-lumen 22 fr (Active)   Urine Color Cherry 11/16/21 1847   Urine Appearance Clear 11/16/21 1847       Suprapubic Catheter (Active)   Dressing Status Clean; Dry; Intact 11/16/21 1847       [REMOVED] Urethral Catheter Triple-lumen 20 fr (Removed)       [REMOVED] Urethral Catheter Double-lumen 16 fr (Removed)       [REMOVED] Urethral Catheter Double-lumen 22 fr (Removed)       [REMOVED] Suprapubic Catheter Non-latex (Removed)       Findings: 16 Bel[place]ian suprapubic tube changed without difficulty and 10 cc in balloon. Bladder neck contracture small opening this was widely resected. 25 [place]ian three-way Harper catheter 10 cc balloon    Detailed Description of Procedure:   See dictated report:  [id number]    Disposition to PACU then admit observation on CBI overnight.   Probably home with Harper catheter and suprapubic tube tomorrow need suprapubic tube plugged remove urethral catheter approximately 1 week    Electronically signed by Cindy De La O MD on 11/16/2021 at 6:50 PM

## 2021-11-17 NOTE — CARE COORDINATION
Date / Time of Evaluation: 11/17/2021 8:26 AM  Assessment Completed by: Kathie Tobar    Patient Admission Status: Observation [104]    [address]    [phone] (home)   Telephone Information:   Mobile [phone]       (Best Practice:  Have patient / caregiver verify above address and phone number by stating out loud their current address and reachable phone number.)  Is above information correct? yes      Current PCP:  Rishi Reece    Initial Assessment Completed at bedside with:  patient    Emergency Contacts:  Extended Emergency Contact Information  Primary Emergency Contact: Rubén Berry, [address] Phone: [phone]  Relation: Other   needed? No  Secondary Emergency ContactUnice Viraj  Home Phone: [phone]  Relation: Other   needed? No    Advance Directives: Code Status:  Full Code    Financial:  Payor: MEDICARE / Plan: MEDICARE PART A AND B / Product Type: *No Product type* /     Pre-Cert required for SNF:  N/A    Pharmacy:   [facility] [address] TERRENCE ROWAN [address] [place] [address], [address] [place] [address]  Phone: [phone] Fax: [facility], [facility] [phone] -  [phone]  [address] [place] [address] Road [facility]  Phone: [phone] Fax: [phone]      Potential assistance purchasing medications? No    ADLS:  Support System:  Family live close by but lives alone  Current Home Environment:  Family live close by but lives alone  Steps:  No    Plans to RETURN to current housing: Yes  Barriers to RETURNING to current housing:  Yes patient does live alone but wants to stay as independent as long as he can.      Currently ACTIVE with Home Health CARE:  Yes  [address] [place] [address]:  [place]    DME Provider:  N/A     Has a pulse oximetry unit at home: N/A    Had [address] prior to admission:  No  Oxygen Company:  N/A  Informed of need to bring portable home O2 tank to hospital on day of DISCHARGE:  N/A  Name of person committed to bringing portable tank at discharge: Active with HD/PD prior to admission: No  Nephrologist:  N/A  HD Center:  N/A    Transition Plan:   home alone w family support    Transportation PLAN for Discharge:  Personal car    Factors facilitating achievement of predicted outcomes: medical clearance    Barriers to discharge:  Yes patient does live alone but wants to stay as independent as long as he can. Additional CM/SW Notes: SW met with Pt regarding DC planning options. Pt stated he lost his spouse about 6 months ago. Pt stated he may need a convalescent type facility at some point but Pt would like to go home and stay as independent as long as he can. Pt stated he has nephews that live close by and a sister in law that he stays in contact with. Pt stated he would like to have home health services again with the [facility] if available. Pt will need a home care needs order added to the system. Rita Ramirez and/or his family were provided with choice of provider.         [facility] Management Department  Ph:    Fax: [phone]

## 2021-11-17 NOTE — ANESTHESIA POSTPROCEDURE EVALUATION
Department of Anesthesiology  Postprocedure Note    Patient: Cal Schreiber  MRN: [id number]  YOB: 1934  Date of evaluation: 11/16/2021  Time:  6:48 PM     Procedure Summary     Date: 11/16/21 Room / Location: [facility] OR [facility]    Anesthesia Start: 1737 Anesthesia Stop: 1848    Procedure: CYSTOSCOPY TRANSURETHRAL EXCISION BLADDER NECK, SUPRAPUBIC CATHETER EXCHANGE (16f) (N/A Scrotum) Diagnosis: (BLADDER NECK CONTRACTURE)    Surgeons: aJy Worrell MD Responsible Provider: BRYAN Yang CRNA    Anesthesia Type: general ASA Status: 2          Anesthesia Type: general    Sheri Phase I: Sheri Score: 9    Sheri Phase II:      Last vitals: Reviewed and per EMR flowsheets.        Anesthesia Post Evaluation    Patient location during evaluation: PACU  Patient participation: complete - patient participated  Level of consciousness: awake and alert  Pain score: 0  Airway patency: patent  Nausea & Vomiting: no nausea and no vomiting  Complications: no  Cardiovascular status: blood pressure returned to baseline  Respiratory status: acceptable and room air  Hydration status: euvolemic

## 2021-11-17 NOTE — OP NOTE
We will cover him with broad-spectrum  antibiotics perioperatively. The other risks including continence,  hematuria, need to go home with Harper catheter, and continued urinary  retention. He understands and agrees to proceed. DESCRIPTION OF PROCEDURE:  The patient was brought to the operating  room, underwent general anesthetic. He was placed in the lithotomy  position. His genitalia was prepped and draped per routine sterile  fashion as well as a suprapubic tube site and an indwelling catheter was  prepped. He received preoperative antibiotic and time-out was  performed. I first began by placing the resectoscope into the patient's  urethra and this was advanced under direct vision, crossed the  membranous urethra into the prostatic urethra. The prostatic urethra  reveals a wide open prostatic fossa. There was a bladder neck  contracture. It looks to be about maybe 10 to 12 [place] in size, maybe  smaller, but it was so small I could not get the resectoscope loop to go  through the opening, so I switched to a [employer] and then I cut the  contracture at 3 and at 9 o'clock position in order then that I could  get across this bladder neck into the patient's bladder. At this point,  then we went ahead and changed the suprapubic tube. The surgical tech  assistant deflated the existing suprapubic tube balloon. This was  removed and a new 16-Sudanese Harper catheter was inserted through the  suprapubic tube tract. This was confirmed under direct cystoscopic  visualization to be within the bladder; 10 mL was placed in the balloon  and this was seated up against the anterior abdominal wall in good  position. I then placed a plug and a new suprapubic catheter. There  was some encrustation coming off the old catheter and these were washed  out.   I then switched back to the resectoscope loop and then I did a  circumferential aggressive resection of the bladder neck contracture  down to visible bladder neck muscles. There was a couple _____ actually  down to a little bit of a fat. As I encountered arterial bleeders,  pinpoint cauterization was used to cauterize these under direct pinpoint  cauterization but I tried off to cauterize just in general, not to _____  the tissue. The right and left ureteral orifices were identified  throughout the case and they were well away from the resection in the  bladder neck. I note that the veru in the membranous urethra was  identified and at no point did I resect close to this area as well. At  this point, I felt I had resected the contracture adequately without  causing any significant, you know, perforation. I used the [employer], and removed the chips and tissue that were sent for  pathologic examination. There was good hemostasis. I removed the  cystoscope and then a 22-Serbian 3-way Harper catheter was inserted  without difficulty, 10 mL was placed in the balloon and then this was  hand irrigated and seated in good position and placed to CBI with normal  saline. The procedure was then terminated. He was awakened from his  anesthetic and taken to the recovery room in stable condition. We will  maintain him on CBI overnight. Estimated blood loss was approximately  10 mL.         Jamaica Rojo MD    D: 11/16/2021 20:00:29      T: 11/17/2021 1:17:17     [email or web address]  Job#: [id number]     Doc#: [id number]    CC:

## 2021-11-17 NOTE — DISCHARGE SUMMARY
Discharge Summary     Date:11/17/2021        Patient Valencia Phillips     YOB: 1934     Age:87 y.o. Admit Date:11/16/2021   Admission Condition:good   Discharged Condition:good  Discharge Date: 11/17/21     Discharge Diagnoses   Active Problems:    Bladder neck contracture  Resolved Problems:    * No resolved hospital problems. [facility] Stay   Narrative of Hospital Course:   Patient is an 80year old patient that has an extensive history of BPH. He underwent TURP then subsequently was unable to void and was found to have a bladder neck contracture. He underwent transurethral excision bladder neck contracture with suprapubic catheter placement. He underwent surveillance cystoscopy which revealed a new bladder neck contracture. Therefore he underwent cystoscopy transurethral excision bladder neck and suprapubic catheter change on 11/16/21. He has done well overnight. CBI weaned with pink urine noted in drainage bag. Hemodynamically stable with no pain. Will be discharged today with granger catheter and follow up in office for voiding trial.     Consultants:   None    Time Spent on Discharge:  30 minutes were spent in patient examination, evaluation, counseling as well as medication reconciliation, prescriptions for required medications, discharge plan and follow up. Surgeries/Procedures Performed:  Procedure(s):  CYSTOSCOPY TRANSURETHRAL EXCISION BLADDER NECK, SUPRAPUBIC CATHETER EXCHANGE (16f)      Treatments:   IV hydration, antibiotics: Cipro and surgery: Cystoscopy transurethral excision bladder neck, suprapubic catheter exchange.      Significant Diagnostic Studies:   Recent Labs:  CBC:   Lab Results   Component Value Date    WBC 9.2 11/17/2021    RBC 3.85 11/17/2021    HGB 10.9 11/17/2021    HCT 35.1 11/17/2021    MCV 91.2 11/17/2021    MCH 28.3 11/17/2021    MCHC 31.1 11/17/2021    RDW 13.1 11/17/2021     11/17/2021     CMP:    Lab Results   Component Value Date    GLUCOSE 110 11/17/2021     11/17/2021    K 4.0 11/17/2021     11/17/2021    CO2 25 11/17/2021    BUN 9 11/17/2021    CREATININE 0.6 11/17/2021    ANIONGAP 10 11/17/2021    ALKPHOS 87 09/19/2021    ALT 14 09/19/2021    AST 19 09/19/2021    BILITOT <0.2 09/19/2021    LABALBU 2.8 09/19/2021    LABGLOM >60 11/17/2021    GFRAA >59 11/17/2021    PROT 4.5 09/19/2021    CALCIUM 8.7 11/17/2021       Radiology Last 7 Days:  No results found. Discharge Plan   Disposition: Home    Provider Follow-Up:   BRYAN Watts CNP  [address] [phone]    In 1 week  voiding trial, early am appt       Hospital/Incidental Findings Requiring Follow-Up:  Will follow up in 1 week for voiding trial in office.        Patient Instructions   Diet: regular diet    Activity: no driving, no heavy lifting for 1 weeks    Other Instructions:   Hold aspirin for 3 days    Discharge Medications         Medication List      CONTINUE taking these medications    aspirin 81 MG EC tablet     atorvastatin 20 MG tablet  Commonly known as: LIPITOR     Cranberry 500 MG Tabs     donepezil 10 MG tablet  Commonly known as: ARICEPT     hypromellose 0.5 % ophthalmic solution  Commonly known as: ISOPTO TEARS     levoFLOXacin 500 MG tablet  Commonly known as: LEVAQUIN  Take 1 tablet by mouth daily for 10 days     lisinopril 5 MG tablet  Commonly known as: PRINIVIL;ZESTRIL     melatonin 3 MG Tabs tablet     memantine 10 MG tablet  Commonly known as: NAMENDA     metFORMIN 1000 MG tablet  Commonly known as: GLUCOPHAGE     mirtazapine 30 MG tablet  Commonly known as: REMERON     Multivitamin Men 50+ Tabs     omeprazole 20 MG delayed release capsule  Commonly known as: PRILOSEC     verapamil 120 MG tablet  Commonly known as: CALAN     vitamin E 400 UNIT capsule            Electronically signed by BRYAN Pruett CNP on 11/17/21 at 7:45 AM CST

## 2021-11-17 NOTE — CARE COORDINATION
[facility] referral received. [facility]   Referral faxed to [facility] office  P. [phone]  F. [phone]  Electronically signed by Dalia Elliott on 11/17/21 at 10:16 AM CST

## 2021-11-17 NOTE — DISCHARGE INSTR - DIET

## 2021-11-17 NOTE — PROGRESS NOTES
4 Eyes Skin Assessment    Mikhail Tavares is being assessed upon: Admission    I agree that I, Lena Atwood RN, along with Dalia Blancas RN (either 2 RN's or 1 LPN and 1 RN) have performed a thorough Head to Toe Skin Assessment on the patient. ALL assessment sites listed below have been assessed. Areas assessed by both nurses:     [x]   Head, Face, and Ears   [x]   Shoulders, Back, and Chest  [x]   Arms, Elbows, and Hands   [x]   Coccyx, Sacrum, and Ischium  [x]   Legs, Feet, and Heels    Does the Patient have Skin Breakdown?  No    Tucker Prevention initiated: No  Wound Care Orders initiated: No    WOC nurse consulted for Pressure Injury (Stage 3,4, Unstageable, DTI, NWPT, and Complex wounds) and New or Established Ostomies: No        Primary Nurse eSignature: Lena Atwood RN on 11/16/2021 at 8:04 PM      Co-Signer eSignature: Electronically signed by Dalia Blancas RN on 11/17/21 at 1:43 AM CST

## 2021-11-17 NOTE — PROGRESS NOTES
Mikhail Tavares received from PACU to room # [id number] . Mental Status: Patient is alert, coherent, logical and able to concentrate and follow conversation. Vitals:    11/16/21 1945   BP: 124/67   Pulse: 54   Resp: 12   Temp: 96.8 °F (36 °C)   SpO2: 96%     Placed on cardiac monitor: No.  Belongings: bag clothing with patient at bedside . Family at bedside No.  Oriented Patient to room. Call light within reach. Yes. Transfer was: Well tolerated by patient. .    Electronically signed by Lena Atwood RN on 11/16/2021 at 8:02 PM

## 2021-11-28 NOTE — PROGRESS NOTES
Casimiro Branch is a 80 y.o. male who presents today No chief complaint on file. {[id number]}   Urine culture neg    Underwent surgery on 11/17/21      The patient is an 49-year-old gentleman who underwent TURP for  urinary retention and BPH on 04/14/2021. He about 4 months later  developed recurrent retention and inability to urinate with residuals  over 400 and I scoped him in the office and this revealed almost  complete occlusion of his bladder neck. He underwent suprapubic tube  placement and then subsequently transurethral incision of bladder neck  contracture on 09/17/2021. He had been voiding spontaneously, however,  I scoped him in the office on 11/15/2021 to see if the bladder neck  remained open and this did indeed show that the bladder neck contracture  had recurred with a small opening. At that point, I recommended that we  maintain a suprapubic tube. This will need to be changed today and we  will take him to the operating room for a transurethral resection of the  recurrent bladder neck contracture. He does understand the possibility  that this may not stay open and then we may have only other option as to  maintain him on a suprapubic catheter. We also discussed the risks of  infection. I did send a urine culture from the office and he had been  started on antibiotics yesterday.    Past Medical History:   Diagnosis Date    Arthritis     Bladder neck contracture     Diabetes mellitus ([address]     History of blood transfusion     Hyperlipidemia     Hypertension     Immunization due     Neuropathy     Palliative care patient 09/20/2021       Past Surgical History:   Procedure Laterality Date    COLONOSCOPY      CYSTOSCOPY N/A 8/24/2021    SUPRAPUBIC TUBE PLACEMENT performed by Liyah Soto MD at [facility] N/A 9/16/2021    CYSTOSCOPY TRANSURETHRAL inison BLADDER Bladder neck contracture ; exchange supra pubic catheter performed by Liyah Soto MD at [address] [place] [address] CYSTOSCOPY N/A 11/16/2021    CYSTOSCOPY TRANSURETHRAL EXCISION BLADDER NECK, SUPRAPUBIC CATHETER EXCHANGE (16f) performed by Scarlett Gillespie MD at [address] [place] [address] ENDOSCOPY, COLON, DIAGNOSTIC      EYE SURGERY Bilateral     cataract    NOSE SURGERY      SKIN BIOPSY      TONSILLECTOMY      TURP N/A 4/14/2021    TRANSURETHRAL RESECTION PROSTATE performed by Scarlett Gillespie MD at [facility] OR       Current Outpatient Medications   Medication Sig Dispense Refill    aspirin 81 MG EC tablet Take 81 mg by mouth daily      lisinopril (PRINIVIL;ZESTRIL) 5 MG tablet Take 5 mg by mouth daily      memantine (NAMENDA) 10 MG tablet Take 10 mg by mouth 2 times daily      vitamin E 400 UNIT capsule Take 400 Units by mouth daily      hypromellose (ISOPTO TEARS) 0.5 % ophthalmic solution Place 2 drops into both eyes 2 times daily      Cranberry 500 MG TABS Take 1 tablet by mouth daily      Multiple Vitamins-Minerals (MULTIVITAMIN MEN 50+) TABS Take 1 tablet by mouth daily      omeprazole (PRILOSEC) 20 MG delayed release capsule Take 20 mg by mouth daily       mirtazapine (REMERON) 30 MG tablet Take 15 mg by mouth nightly       atorvastatin (LIPITOR) 20 MG tablet Take 20 mg by mouth daily Take 1/2 tab at bedtime.  verapamil (CALAN) 120 MG tablet Take 120 mg by mouth 2 times daily       donepezil (ARICEPT) 10 MG tablet Take 10 mg by mouth nightly       melatonin 3 MG TABS tablet Take 3 mg by mouth nightly as needed Take 2 hs prn       metFORMIN (GLUCOPHAGE) 1000 MG tablet Take 500 mg by mouth 2 times daily (with meals)        No current facility-administered medications for this visit. Allergies   Allergen Reactions    Pcn [Penicillins] Hives     \"Huge dose of PCN\"       Social History     Socioeconomic History    Marital status:       Spouse name: Not on file    Number of children: 0    Years of education: Not on file    Highest education level: Not on file   Occupational History    Not on file Tobacco Use    Smoking status: Former Smoker     Types: Cigars     Quit date: 2000     Years since quittin.9    Smokeless tobacco: Never Used   Vaping Use    Vaping Use: Never used   Substance and Sexual Activity    Alcohol use: Not Currently    Drug use: Never    Sexual activity: Not on file   Other Topics Concern    Not on file   Social History Narrative    Not on file     Social Determinants of Health     Financial Resource Strain:     Difficulty of Paying Living Expenses: Not on file   Food Insecurity:     Worried About [address] in the Last Year: Not on file    [place] of Food in the Last Year: Not on file   Transportation Needs:     Lack of Transportation (Medical): Not on file    Lack of Transportation (Non-Medical): Not on file   Physical Activity:     Days of Exercise per Week: Not on file    Minutes of Exercise per Session: Not on file   Stress:     Feeling of Stress : Not on file   Social Connections:     Frequency of Communication with Friends and Family: Not on file    Frequency of Social Gatherings with Friends and Family: Not on file    Attends Rastafari Services: Not on file    Active Member of [address] [place] [address] or Organizations: Not on file    Attends Club or Organization Meetings: Not on file    Marital Status: Not on file   Intimate Partner Violence:     Fear of Current or Ex-Partner: Not on file    Emotionally Abused: Not on file    Physically Abused: Not on file    Sexually Abused: Not on file   Housing Stability:     Unable to Pay for Housing in the Last Year: Not on file    Number of [place] in the Last Year: Not on file    Unstable Housing in the Last Year: Not on file       No family history on file. REVIEW OF SYSTEMS:  Review of Systems    PHYSICAL EXAM:  There were no vitals taken for this visit. Physical Exam        DATA:  {LABS:[id number]}  No results found for this visit on 21.   No results found for: PSA  No results found for:

## 2021-11-29 ENCOUNTER — OFFICE VISIT (OUTPATIENT)
Dept: UROLOGY | Age: 86
End: 2021-11-29
Payer: MEDICARE

## 2021-11-29 ENCOUNTER — TELEPHONE (OUTPATIENT)
Dept: UROLOGY | Age: 86
End: 2021-11-29

## 2021-11-29 VITALS — TEMPERATURE: 97.2 F | HEIGHT: 67 IN | WEIGHT: 139.3 LBS | BODY MASS INDEX: 21.87 KG/M2

## 2021-11-29 DIAGNOSIS — R33.8 BENIGN PROSTATIC HYPERPLASIA WITH URINARY RETENTION: ICD-10-CM

## 2021-11-29 DIAGNOSIS — N40.1 BENIGN PROSTATIC HYPERPLASIA WITH URINARY RETENTION: ICD-10-CM

## 2021-11-29 DIAGNOSIS — N32.0 BLADDER NECK CONTRACTURE: Primary | ICD-10-CM

## 2021-11-29 PROCEDURE — 99024 POSTOP FOLLOW-UP VISIT: CPT | Performed by: NURSE PRACTITIONER

## 2021-11-29 PROCEDURE — 51700 IRRIGATION OF BLADDER: CPT | Performed by: NURSE PRACTITIONER

## 2021-11-29 ASSESSMENT — ENCOUNTER SYMPTOMS
BACK PAIN: 0
NAUSEA: 0
ABDOMINAL PAIN: 0
ABDOMINAL DISTENTION: 0
VOMITING: 0

## 2021-11-29 NOTE — TELEPHONE ENCOUNTER
Patient thought his appointment was at 9:30 and his ride just showed up to bring him. They are still heading even though I advised they may be told to rescheduled.

## 2021-11-29 NOTE — PROGRESS NOTES
Felice Henao is a 80 y.o. male who presents today   Chief Complaint   Patient presents with    Follow-up     I am here today for a voiding trial       Patient is an 51-year-old male who presents the clinic today for voiding trial.  He underwent transurethral incision of bladder neck contracture on 11/16/2021 and suprapubic cath change at that time. He originally underwent TURP for urinary retention BPH on 4/14/2021.  4 months later he developed concurrent retention inability urinate with residuals over 400 therefore he was scoped in the office and revealed almost complete occlusion of his bladder neck. He underwent suprapubic tube placement and then subsequently transurethral incision of bladder neck contracture on 9/17/2021. He has been voiding spontaneously however after cystoscopy in office on 11/15/2021 it did show the bladder neck contracture had reoccurred with a small opening. 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 Urine culture obtained on 11/15/2021 was negative, he has completed prophylactic antibiotics. Denies any fever, chills, nausea, vomiting. Has had some dysuria irritation around his meatus.     Past Medical History:   Diagnosis Date    Arthritis     Bladder neck contracture     Diabetes mellitus ([address]     History of blood transfusion     Hyperlipidemia     Hypertension     Immunization due     Neuropathy     Palliative care patient 09/20/2021       Past Surgical History:   Procedure Laterality Date    COLONOSCOPY      CYSTOSCOPY N/A 8/24/2021    SUPRAPUBIC TUBE PLACEMENT performed by Antonella Flores MD at \[facility]\"" N/A 9/16/2021    CYSTOSCOPY TRANSURETHRAL inison BLADDER Bladder neck contracture ; exchange supra pubic catheter performed by Antonella Flores MD at \[facility]\"" N/A 11/16/2021    CYSTOSCOPY TRANSURETHRAL EXCISION BLADDER NECK, SUPRAPUBIC CATHETER EXCHANGE (16f) performed by Katty Francis Freida Schneider MD at [facility] ENDOSCOPY, COLON, DIAGNOSTIC      EYE SURGERY Bilateral     cataract    NOSE SURGERY      SKIN BIOPSY      TONSILLECTOMY      TURP N/A 2021    TRANSURETHRAL RESECTION PROSTATE performed by Josef Gutiérrez MD at [facility] OR       Current Outpatient Medications   Medication Sig Dispense Refill    aspirin 81 MG EC tablet Take 81 mg by mouth daily      lisinopril (PRINIVIL;ZESTRIL) 5 MG tablet Take 5 mg by mouth daily      memantine (NAMENDA) 10 MG tablet Take 10 mg by mouth 2 times daily      vitamin E 400 UNIT capsule Take 400 Units by mouth daily      hypromellose (ISOPTO TEARS) 0.5 % ophthalmic solution Place 2 drops into both eyes 2 times daily      Cranberry 500 MG TABS Take 1 tablet by mouth daily      Multiple Vitamins-Minerals (MULTIVITAMIN MEN 50+) TABS Take 1 tablet by mouth daily      omeprazole (PRILOSEC) 20 MG delayed release capsule Take 20 mg by mouth daily       mirtazapine (REMERON) 30 MG tablet Take 15 mg by mouth nightly       atorvastatin (LIPITOR) 20 MG tablet Take 20 mg by mouth daily Take 1/2 tab at bedtime.  verapamil (CALAN) 120 MG tablet Take 120 mg by mouth 2 times daily       donepezil (ARICEPT) 10 MG tablet Take 10 mg by mouth nightly       melatonin 3 MG TABS tablet Take 3 mg by mouth nightly as needed Take 2 hs prn       metFORMIN (GLUCOPHAGE) 1000 MG tablet Take 500 mg by mouth 2 times daily (with meals)        No current facility-administered medications for this visit. Allergies   Allergen Reactions    Pcn [Penicillins] Hives     \"Huge dose of PCN\"       Social History     Socioeconomic History    Marital status:       Spouse name: None    Number of children: 0    Years of education: None    Highest education level: None   Occupational History    None   Tobacco Use    Smoking status: Former Smoker     Types: Cigars     Quit date: 2000     Years since quittin.9    Smokeless tobacco: Never Used   Vaping Use    Vaping Use: Never used   Substance and Sexual Activity    Alcohol use: Not Currently    Drug use: Never    Sexual activity: None   Other Topics Concern    None   Social History Narrative    None     Social Determinants of Health     Financial Resource Strain:     Difficulty of Paying Living Expenses: Not on file   Food Insecurity:     Worried About Running Out of Food in the Last Year: Not on file    [place] of Food in the Last Year: Not on file   Transportation Needs:     Lack of Transportation (Medical): Not on file    Lack of Transportation (Non-Medical): Not on file   Physical Activity:     Days of Exercise per Week: Not on file    Minutes of Exercise per Session: Not on file   Stress:     Feeling of Stress : Not on file   Social Connections:     Frequency of Communication with Friends and Family: Not on file    Frequency of Social Gatherings with Friends and Family: Not on file    Attends Religion Services: Not on file    Active Member of [address] [place] [address] or Organizations: Not on file    Attends Club or Organization Meetings: Not on file    Marital Status: Not on file   Intimate Partner Violence:     Fear of Current or Ex-Partner: Not on file    Emotionally Abused: Not on file    Physically Abused: Not on file    Sexually Abused: Not on file   Housing Stability:     Unable to Pay for Housing in the Last Year: Not on file    Number of [place] in the Last Year: Not on file    Unstable Housing in the Last Year: Not on file       No family history on file. REVIEW OF SYSTEMS:  Review of Systems   Constitutional: Negative for chills and fever. Gastrointestinal: Negative for abdominal distention, abdominal pain, nausea and vomiting. Genitourinary: Positive for difficulty urinating. Negative for dysuria, flank pain, frequency, hematuria and urgency. Musculoskeletal: Positive for arthralgias and gait problem. Negative for back pain. Neurological: Positive for weakness. Psychiatric/Behavioral: Positive for confusion. Negative for agitation. PHYSICAL EXAM:  Temp 97.2 °F (36.2 °C) (Temporal)   Ht 5' 7\" (1.702 m)   Wt 139 lb 4.8 oz (63.2 kg)   BMI 21.82 kg/m²   Physical Exam  Vitals and nursing note reviewed. Constitutional:       General: He is not in acute distress. Appearance: Normal appearance. He is not ill-appearing. Pulmonary:      Effort: Pulmonary effort is normal. No respiratory distress. Abdominal:      General: There is no distension. Tenderness: There is no abdominal tenderness. There is no right CVA tenderness or left CVA tenderness. Genitourinary:      Neurological:      Mental Status: He is alert. Mental status is at baseline. He is disoriented. Psychiatric:         Mood and Affect: Mood normal.         Behavior: Behavior normal.           1. Bladder neck contracture  Patient is status post transurethral incision of bladder neck. Pathology negative. Urethral catheter was discontinued today. 120 mL of sterile water was instilled into patient's bladder with urge to void. He was able to void approximately 25 mL. Discussed with him if he is unable to void he needs to empty his suprapubic catheter and chart this. If he is able to void he does need to check residuals with his suprapubic catheter and chart this. Discontinued dressing around suprapubic area and cleansed with sterile water. Will have patient follow-up in 2 weeks for suprapubic cath change and follow-up with Dr. Braeden Gutiérrez. Patient states he understands how to document urine output and will bring a log to his follow-up if he can remember. Graduates and urinal sent with patient. Will need cystoscopy with Dr. Braeden Gutiérrez in 3 months. 2. Benign prostatic hyperplasia with urinary retention  Unable to fully empty his bladder today after voiding trial.  See above. No orders of the defined types were placed in this encounter.        Return in about 17 days (around 12/16/2021) for Nurse visit cath change on 12/16. Will also need 1 month f/u with Carlo Rodríguez. All information inputted into the note by the MA to include chief complaint, past medical history, past surgical history, medications, allergies, social and family history and review of systems has been reviewed and updated as needed by me. EMR Dragon/transcription disclaimer: Much of this documentt is electronic  transcription/translation of spoken language to printed text. The  electronic translation of spoken language may be erroneous, or at times,  nonsensical words or phrases may be inadvertently transcribed.  Although I  have reviewed the document for such errors, some may still exist.

## 2021-12-15 ENCOUNTER — NURSE ONLY (OUTPATIENT)
Dept: UROLOGY | Age: 86
End: 2021-12-15
Payer: MEDICARE

## 2021-12-15 DIAGNOSIS — N40.1 BENIGN PROSTATIC HYPERPLASIA WITH URINARY RETENTION: ICD-10-CM

## 2021-12-15 DIAGNOSIS — R33.8 BENIGN PROSTATIC HYPERPLASIA WITH URINARY RETENTION: ICD-10-CM

## 2021-12-15 DIAGNOSIS — N32.0 BLADDER NECK CONTRACTURE: Primary | ICD-10-CM

## 2021-12-15 PROCEDURE — 51702 INSERT TEMP BLADDER CATH: CPT | Performed by: NURSE PRACTITIONER

## 2021-12-15 NOTE — PROGRESS NOTES
Chronic Suprapubic Catheter   Patient presents today with a history of a chronic suprapubic catheter for several months. The etiology is felt to be due to: Neurogenic bladder. Overall, the problem is unchanged. Procedure Note (12/15/21): The patient's suprapubic catheter was removed. Using sterile technique patient was cleansed with Hibiclens and sterile water solution, 16 Icelandic SP catheter was inserted into the bladder, urine was drained from bladder, 10 mL of sterile water was used to fill up the catheter balloon and catheter was then hooked up to drainage bag. The patient tolerated the procedure well. BRYAN Jernigan was in office at time of procedure. Patient will follow up with Dr. Brent Cannon on 1/3/22 and 1 month cath change 1/19/22.

## 2022-01-03 ENCOUNTER — OFFICE VISIT (OUTPATIENT)
Dept: UROLOGY | Age: 87
End: 2022-01-03
Payer: MEDICARE

## 2022-01-03 VITALS — HEIGHT: 67 IN | WEIGHT: 141 LBS | BODY MASS INDEX: 22.13 KG/M2

## 2022-01-03 DIAGNOSIS — R30.0 DYSURIA: ICD-10-CM

## 2022-01-03 DIAGNOSIS — N32.0 BLADDER NECK CONTRACTURE: Primary | ICD-10-CM

## 2022-01-03 LAB
BACTERIA URINE, POC: ABNORMAL
BILIRUBIN URINE: 0 MG/DL
BLOOD, URINE: POSITIVE
CASTS URINE, POC: 0
CLARITY: ABNORMAL
COLOR: ABNORMAL
CRYSTALS URINE, POC: 0
EPI CELLS URINE, POC: 0
GLUCOSE URINE: 1000
KETONES, URINE: POSITIVE
LEUKOCYTE EST, POC: ABNORMAL
NITRITE, URINE: POSITIVE
PH UA: 6 (ref 4.5–8)
PROTEIN UA: POSITIVE
RBC URINE, POC: 30
SPECIFIC GRAVITY UA: 1 (ref 1–1.03)
UROBILINOGEN, URINE: NORMAL
WBC URINE, POC: 10
YEAST URINE, POC: 0

## 2022-01-03 PROCEDURE — G8427 DOCREV CUR MEDS BY ELIG CLIN: HCPCS | Performed by: UROLOGY

## 2022-01-03 PROCEDURE — G8420 CALC BMI NORM PARAMETERS: HCPCS | Performed by: UROLOGY

## 2022-01-03 PROCEDURE — 4040F PNEUMOC VAC/ADMIN/RCVD: CPT | Performed by: UROLOGY

## 2022-01-03 PROCEDURE — 81001 URINALYSIS AUTO W/SCOPE: CPT | Performed by: UROLOGY

## 2022-01-03 PROCEDURE — 1036F TOBACCO NON-USER: CPT | Performed by: UROLOGY

## 2022-01-03 PROCEDURE — 99024 POSTOP FOLLOW-UP VISIT: CPT | Performed by: UROLOGY

## 2022-01-03 PROCEDURE — G8484 FLU IMMUNIZE NO ADMIN: HCPCS | Performed by: UROLOGY

## 2022-01-03 PROCEDURE — 1123F ACP DISCUSS/DSCN MKR DOCD: CPT | Performed by: UROLOGY

## 2022-01-03 ASSESSMENT — ENCOUNTER SYMPTOMS
CHEST TIGHTNESS: 0
EYE REDNESS: 0
VOMITING: 0
FACIAL SWELLING: 0
EYE DISCHARGE: 0
WHEEZING: 0
NAUSEA: 0
BACK PAIN: 0
SORE THROAT: 0

## 2022-01-03 NOTE — PROGRESS NOTES
Anthony Gentile is a 80 y.o. male who presents today   Chief Complaint   Patient presents with    Follow-up     I am here today for a 1 month FU. 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 Patient underwent TURP for BPH and urinary retention on 6/20/9094 this was complicated by bladder neck contracture with complete occlusion of his bladder neck requiring suprapubic tube placement and subsequent incision of bladder neck contracture done on 9/17/2021. Follow-up cystoscopy done 11/15/2021 showed a recurrent bladder neck contracture and he was taken the operating room on 11/17/2021 and underwent TUR bladder neck contracture. He still has a suprapubic tube in place. This was changed just last month. He states he is voiding spontaneously at the end of his penis. Suprapubic tube is clamped. He does complain of some dysuria and pain at the end of his penis. He denies any fevers or chills his urine is dark and cloudy. A post void residual through the suprapubic tube today was only 10 mL.       Past Medical History:   Diagnosis Date    Arthritis     Bladder neck contracture     Diabetes mellitus ([facility] [address]     History of blood transfusion     Hyperlipidemia     Hypertension     Immunization due     Neuropathy     Palliative care patient 09/20/2021       Past Surgical History:   Procedure Laterality Date    COLONOSCOPY      CYSTOSCOPY N/A 8/24/2021    SUPRAPUBIC TUBE PLACEMENT performed by Enoch Coyle MD at [facility] N/A 9/16/2021    CYSTOSCOPY TRANSURETHRAL inison BLADDER Bladder neck contracture ; exchange supra pubic catheter performed by Enoch Coyle MD at [facility] N/A 11/16/2021    CYSTOSCOPY TRANSURETHRAL EXCISION BLADDER NECK, SUPRAPUBIC CATHETER EXCHANGE (16f) performed by Enoch Coyle MD at [address] [place] [address] ENDOSCOPY, COLON, DIAGNOSTIC      EYE SURGERY Bilateral     cataract    NOSE SURGERY      SKIN BIOPSY  TONSILLECTOMY      TURP N/A 2021    TRANSURETHRAL RESECTION PROSTATE performed by Williams Ann MD at [facility] OR       Current Outpatient Medications   Medication Sig Dispense Refill    aspirin 81 MG EC tablet Take 81 mg by mouth daily      lisinopril (PRINIVIL;ZESTRIL) 5 MG tablet Take 5 mg by mouth daily      memantine (NAMENDA) 10 MG tablet Take 10 mg by mouth 2 times daily      vitamin E 400 UNIT capsule Take 400 Units by mouth daily      hypromellose (ISOPTO TEARS) 0.5 % ophthalmic solution Place 2 drops into both eyes 2 times daily      Cranberry 500 MG TABS Take 1 tablet by mouth daily      Multiple Vitamins-Minerals (MULTIVITAMIN MEN 50+) TABS Take 1 tablet by mouth daily      omeprazole (PRILOSEC) 20 MG delayed release capsule Take 20 mg by mouth daily       mirtazapine (REMERON) 30 MG tablet Take 15 mg by mouth nightly       atorvastatin (LIPITOR) 20 MG tablet Take 20 mg by mouth daily Take 1/2 tab at bedtime.  verapamil (CALAN) 120 MG tablet Take 120 mg by mouth 2 times daily       donepezil (ARICEPT) 10 MG tablet Take 10 mg by mouth nightly       melatonin 3 MG TABS tablet Take 3 mg by mouth nightly as needed Take 2 hs prn       metFORMIN (GLUCOPHAGE) 1000 MG tablet Take 500 mg by mouth 2 times daily (with meals)        No current facility-administered medications for this visit. Allergies   Allergen Reactions    Pcn [Penicillins] Hives     \"Huge dose of PCN\"       Social History     Socioeconomic History    Marital status:       Spouse name: None    Number of children: 0    Years of education: None    Highest education level: None   Occupational History    None   Tobacco Use    Smoking status: Former Smoker     Types: Cigars     Quit date:      Years since quittin.0    Smokeless tobacco: Never Used   Vaping Use    Vaping Use: Never used   Substance and Sexual Activity    Alcohol use: Not Currently    Drug use: Never    Sexual activity: None Other Topics Concern    None   Social History Narrative    None     Social Determinants of Health     Financial Resource Strain:     Difficulty of Paying Living Expenses: Not on file   Food Insecurity:     Worried About Running Out of Food in the Last Year: Not on file    [place] of Food in the Last Year: Not on file   Transportation Needs:     Lack of Transportation (Medical): Not on file    Lack of Transportation (Non-Medical): Not on file   Physical Activity:     Days of Exercise per Week: Not on file    Minutes of Exercise per Session: Not on file   Stress:     Feeling of Stress : Not on file   Social Connections:     Frequency of Communication with Friends and Family: Not on file    Frequency of Social Gatherings with Friends and Family: Not on file    Attends Faith Services: Not on file    Active Member of [address] [place] [address] [employer] or Organizations: Not on file    Attends Club or Organization Meetings: Not on file    Marital Status: Not on file   Intimate Partner Violence:     Fear of Current or Ex-Partner: Not on file    Emotionally Abused: Not on file    Physically Abused: Not on file    Sexually Abused: Not on file   Housing Stability:     Unable to Pay for Housing in the Last Year: Not on file    Number of [place] in the Last Year: Not on file    Unstable Housing in the Last Year: Not on file       History reviewed. No pertinent family history. REVIEW OF SYSTEMS:  Review of Systems   Constitutional: Negative for chills and fever. HENT: Negative for facial swelling and sore throat. Eyes: Negative for discharge and redness. Respiratory: Negative for chest tightness and wheezing. Cardiovascular: Negative for chest pain and palpitations. Gastrointestinal: Negative for nausea and vomiting. Endocrine: Negative for polyphagia and polyuria. Genitourinary: Positive for dysuria.  Negative for decreased urine volume, difficulty urinating, enuresis, flank pain, frequency, genital sores, hematuria, penile discharge, penile pain, penile swelling, scrotal swelling, testicular pain and urgency. Musculoskeletal: Negative for back pain and neck stiffness. Skin: Negative for rash and wound. Neurological: Negative for dizziness and headaches. Hematological: Negative for adenopathy. Does not bruise/bleed easily. Psychiatric/Behavioral: Negative for confusion and hallucinations. PHYSICAL EXAM:  Ht 5' 7\" (1.702 m)   Wt 141 lb (64 kg)   BMI 22.08 kg/m²   Physical Exam  Constitutional:       General: He is not in acute distress. Appearance: Normal appearance. He is well-developed. HENT:      Head: Normocephalic and atraumatic. Nose: Nose normal.   Eyes:      General: No scleral icterus. Conjunctiva/sclera: Conjunctivae normal.      Pupils: Pupils are equal, round, and reactive to light. Neck:      Trachea: No tracheal deviation. Cardiovascular:      Rate and Rhythm: Normal rate and regular rhythm. Pulmonary:      Effort: Pulmonary effort is normal. No respiratory distress. Breath sounds: No stridor. Abdominal:      General: There is no distension. Palpations: Abdomen is soft. There is no mass. Tenderness: There is no abdominal tenderness. Comments: Suprapubic tube in place some mild exudate from the suprapubic site otherwise it looks clean. Musculoskeletal:         General: No tenderness. Normal range of motion. Cervical back: Normal range of motion and neck supple. Lymphadenopathy:      Cervical: No cervical adenopathy. Skin:     General: Skin is warm and dry. Findings: No erythema. Neurological:      Mental Status: He is alert and oriented to person, place, and time.    Psychiatric:         Behavior: Behavior normal.         Judgment: Judgment normal.             DATA:    Results for orders placed or performed in visit on 01/03/22   POCT Urinalysis Dipstick w/ Micro (Auto)   Result Value Ref Range    Color, UA SmartCrowds, UA Cloudy (A) Clear    Glucose, Ur 1000     Bilirubin Urine 0 mg/dL    Ketones, Urine Positive     Specific Gravity, UA 1.005 1.005 - 1.030    Blood, Urine Positive     pH, UA 6.0 4.5 - 8.0    Protein, UA Positive (A) Negative    Nitrite, Urine Positive     Leukocytes, UA small     Urobilinogen, Urine Normal     rbc urine, poc 30     wbc urine, poc 10     bacteria urine, poc 1+     yeast urine, poc 0     casts urine, poc 0     Epi Cells Urine, POC 0     crystals urine, poc 0        1. Bladder neck contracture he appears to be voiding through his penis spontaneously  Residual today was 10 mL. We will plan for cystoscopy and if his bladder neck is open we will remove the SP tube. I did tell him if his bladder neck contracture has recurred and I would recommend leaving the SP tube in place with regular SP tube changes. - POCT Urinalysis Dipstick w/ Micro (Auto)    2. Dysuria  This is likely colonization from having chronic indwelling SP tube but given the upcoming instrumentation we will go ahead and empirically send a culture and treat with culture Zug antibiotics once we get the results. - Culture, Urine      Orders Placed This Encounter   Procedures    Culture, Urine     Order Specific Question:   Specify (ex-cath, midstream, cysto, etc)? Answer:   clean catch    POCT Urinalysis Dipstick w/ Micro (Auto)        Return in about 2 weeks (around 1/17/2022) for Cystoscopy on next visit possible SP tube change. .    All information inputted into the note by the MA to include chief complaint, past medical history, past surgical history, medications, allergies, social and family history and review of systems has been reviewed and updated as needed by me. EMR Dragon/transcription disclaimer: Much of this documentt is electronic  transcription/translation of spoken language to printed text.  The  electronic translation of spoken language may be erroneous, or at times,  nonsensical words or phrases may be inadvertently transcribed.  Although I  have reviewed the document for such errors, some may still exist.

## 2022-01-03 NOTE — PROGRESS NOTES
Patient of RUDDY Hogue presents today for voiding trial post transurethral incision of bladder neck contracture. The patient denies any fever, chills or  N&V. After patient had given consent, using the catheter in place, 125cc of sterile water was installed into the bladder with no complications. Patient was able to void 25cc. RUDDY Hogue was in office at time of procedure. Patient was taught how to in and out cath at this time. Patient is to follow up as scheduled.

## 2022-01-05 ENCOUNTER — TELEPHONE (OUTPATIENT)
Dept: UROLOGY | Age: 87
End: 2022-01-05

## 2022-01-05 DIAGNOSIS — N30.90 CYSTITIS: Primary | ICD-10-CM

## 2022-01-05 LAB
ORGANISM: ABNORMAL
URINE CULTURE, ROUTINE: ABNORMAL
URINE CULTURE, ROUTINE: ABNORMAL

## 2022-01-05 RX ORDER — NITROFURANTOIN 25; 75 MG/1; MG/1
100 CAPSULE ORAL 2 TIMES DAILY
Qty: 28 CAPSULE | Refills: 0 | Status: SHIPPED | OUTPATIENT
Start: 2022-01-05 | End: 2022-01-19

## 2022-01-05 NOTE — TELEPHONE ENCOUNTER
Called and informed pt caregiver Andrew Thakur that atb was faxed to local pharmacy for pt to start taking due to positive urine culture result.

## 2022-01-05 NOTE — TELEPHONE ENCOUNTER
----- Message from BRYAN Murray CNP sent at 1/5/2022  8:04 AM CST -----  Please let Mr. Ethel Jorgensen know his urine came back positive for bacteria.  I am sending in macrobid to start today

## 2022-01-05 NOTE — RESULT ENCOUNTER NOTE
Please let Mr. Lemon Faizan know his urine came back positive for bacteria.  I am sending in macrobid to start today

## 2022-01-19 ENCOUNTER — PROCEDURE VISIT (OUTPATIENT)
Dept: UROLOGY | Age: 87
End: 2022-01-19
Payer: MEDICARE

## 2022-01-19 VITALS — BODY MASS INDEX: 22.29 KG/M2 | TEMPERATURE: 98.2 F | HEIGHT: 67 IN | WEIGHT: 142 LBS

## 2022-01-19 DIAGNOSIS — N32.0 BLADDER NECK CONTRACTURE: Primary | ICD-10-CM

## 2022-01-19 LAB
BILIRUBIN, POC: ABNORMAL
BLOOD URINE, POC: ABNORMAL
CLARITY, POC: ABNORMAL
COLOR, POC: ABNORMAL
GLUCOSE URINE, POC: ABNORMAL
KETONES, POC: ABNORMAL
LEUKOCYTE EST, POC: ABNORMAL
NITRITE, POC: ABNORMAL
PH, POC: 6
PROTEIN, POC: ABNORMAL
SPECIFIC GRAVITY, POC: >=1.03
UROBILINOGEN, POC: 1

## 2022-01-19 PROCEDURE — 52000 CYSTOURETHROSCOPY: CPT | Performed by: UROLOGY

## 2022-01-19 PROCEDURE — 81003 URINALYSIS AUTO W/O SCOPE: CPT | Performed by: UROLOGY

## 2022-01-19 NOTE — PROGRESS NOTES
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 Patient underwent TURP for BPH and urinary retention on 2/00/6900 this was complicated by bladder neck contracture with complete occlusion of his bladder neck requiring suprapubic tube placement and subsequent incision of bladder neck contracture done on 9/17/2021. Follow-up cystoscopy done 11/15/2021 showed a recurrent bladder neck contracture and he was taken the operating room on 11/17/2021 and underwent TUR bladder neck contracture. He still has a suprapubic tube in place. This is changed monthly. He states he is voiding spontaneously. He has some discomfort at the end of his penis. Suprapubic tube is clamped. He does complain of some dysuria and pain at the end of his penis. He was seen on 1/3/2022. His urine culture at that time was positive he was treated with Macrobid. He comes in today for repeat cystoscopy to see if the bladder neck contracture remains open after the last resection done in November 2021 and for suprapubic tube change. His residual today was 60 mL through the suprapubic catheter.       Cystoscopy Procedure Note    Indications: Diagnosis    Pre-operative Diagnosis: Bladder neck contracture    Post-operative Diagnosis: Same    Surgeon: Toby Duran MD     Assistants: staff    Anesthesia: Local anesthesia topical 2% lidocaine gel    Procedure Details   The risks, benefits, complications, treatment options, and expected outcomes were discussed with the patient. The patient concurred with the proposed plan, giving informed consent. Cystoscopy was performed today under local anesthesia, using sterile technique. The patient was placed in the supine position, prepped with Hibiclens, and draped in the usual sterile fashion. A 17 [place] Islander sheath flexible cystoscope was used to inspect both the urethra and bladder using the flexible scope.     Findings:  Anterior urethra: normal without strictures and without scarring. Prostate:  Prostatic urethra: It is open. There was some calcification the prostatic urethra I tried to remove this with graspers and it would just crumble. The bladder neck itself was slitlike. There was a firm contracture I could not get the flexible scope past this. This is consistent with recurrent bladder neck contracture. Complications:  None; patient tolerated the procedure well. Disposition: To home after observation. Condition: stable      DATA:  BMP:    Lab Results   Component Value Date     11/17/2021    K 4.0 11/17/2021     11/17/2021    CO2 25 11/17/2021    BUN 9 11/17/2021    LABALBU 2.8 09/19/2021    CREATININE 0.6 11/17/2021    CALCIUM 8.7 11/17/2021    GFRAA >59 11/17/2021    LABGLOM >60 11/17/2021    GLUCOSE 110 11/17/2021         1. Bladder neck contracture  Cystoscopy today shows recurrent bladder neck contracture I could not get the scope past the bladder neck. His suprapubic tube was changed today by the nurse. I discussed options with him which would be no treatment leave the suprapubic catheter in place and have this changed once a month by the nurse and for him to check residual by draining his bladder at least once a day and have him continue to spontaneous void best he can. The other option would be to go back to the operating room for repeat resection though I think he would be at high risk for recurrence and he would probably have to do a self dilation. I am not sure this would be the right thing to do given his age. Last option would be a more aggressive approach to make referral to a specialist for consideration of UroLume urethral stent placement. After discussing these with him he seems to be okay with monthly SP tube changes and continuing like he is. I did tell is important for him to unplug the SP tube and drain his bladder at least once a day. He understood.   He will follow-up to see the nurse for monthly SP tube change. He can see the nurse practitioner in 3 months  - Cystoscopy        Orders Placed This Encounter   Procedures    Cystoscopy     Done today     Scheduling Instructions:      today        Return in about 1 month (around 2/19/2022) for Nurse Visit monthly for suprapubic catheter change. Follow-up in 3 months with nurse practitioner,   Follow-up in 3 months with nurse practitioner.

## 2022-02-23 ENCOUNTER — NURSE ONLY (OUTPATIENT)
Dept: UROLOGY | Age: 87
End: 2022-02-23
Payer: MEDICARE

## 2022-02-23 DIAGNOSIS — N32.0 BLADDER NECK CONTRACTURE: ICD-10-CM

## 2022-02-23 PROCEDURE — 51702 INSERT TEMP BLADDER CATH: CPT | Performed by: NURSE PRACTITIONER

## 2022-02-23 NOTE — PROGRESS NOTES
Chronic Suprapubic Catheter   Patient presents today with a history of a chronic suprapubic catheter for several months. The etiology is felt to be due to: bladder neck contracture. Overall, the problem is unchanged. Procedure Note (2/23/22): The patient's suprapubic catheter plug was removed and 200cc was drained from bladder and then catheter was removed. Using sterile technique patient was cleansed with Hibiclens and sterile water solution, 16 Greenlandic SP catheter was inserted into the bladder, urine was drained from bladder, then hand irrigated with sterile water due to bladder debris. 10 mL of sterile water was used to fill up the catheter balloon and catheter was then hooked up to drainage bag. The patient tolerated the procedure well.

## 2022-03-23 ENCOUNTER — NURSE ONLY (OUTPATIENT)
Dept: UROLOGY | Age: 87
End: 2022-03-23
Payer: MEDICARE

## 2022-03-23 DIAGNOSIS — R30.0 DYSURIA: Primary | ICD-10-CM

## 2022-03-23 DIAGNOSIS — N32.0 BLADDER NECK CONTRACTURE: ICD-10-CM

## 2022-03-23 PROCEDURE — 51705 CHANGE OF BLADDER TUBE: CPT | Performed by: NURSE PRACTITIONER

## 2022-03-23 RX ORDER — PHENAZOPYRIDINE HYDROCHLORIDE 100 MG/1
100 TABLET, FILM COATED ORAL 3 TIMES DAILY PRN
Qty: 6 TABLET | Refills: 0 | Status: SHIPPED | OUTPATIENT
Start: 2022-03-23 | End: 2022-03-26

## 2022-03-23 NOTE — PROGRESS NOTES
Chronic Suprapubic Catheter   Patient presents today with a history of a chronic suprapubic catheter for several months. The etiology is felt to be due to: bladder neck contracture. Overall, the problem is unchanged. Procedure Note (2/23/22): The patient's suprapubic catheter plug was removed and 250cc was drained from bladder and then catheter was removed. Using sterile technique patient was cleansed with Hibiclens and sterile water solution, 16 Icelandic SP catheter was inserted into the bladder, urine was drained from bladder, then hand irrigated with sterile water due to bladder debris. 10 mL of sterile water was used to fill up the catheter balloon and catheter was then hooked up to drainage bag. The patient tolerated the procedure well. Patient will follow up in 1 month for a catheter change.

## 2022-03-25 LAB
ORGANISM: ABNORMAL
URINE CULTURE, ROUTINE: ABNORMAL
URINE CULTURE, ROUTINE: ABNORMAL

## 2022-03-25 RX ORDER — NITROFURANTOIN 25; 75 MG/1; MG/1
100 CAPSULE ORAL 2 TIMES DAILY
Qty: 20 CAPSULE | Refills: 0 | Status: SHIPPED | OUTPATIENT
Start: 2022-03-25 | End: 2022-04-04

## 2022-04-18 NOTE — PROGRESS NOTES
Cecelia Gonsales is a 80 y.o. male who presents today   Chief Complaint   Patient presents with    3 Month Follow-Up     Same day cath change         Bladder neck contracture  Patient was last seen in the office in January for evaluation with cystoscopy. Results at that time revealed a recurrent bladder neck contracture. He underwent TURP for BPH and urinary retention on 4/14/2021 which resulted in bladder neck contracture and placement of a suprapubic catheter on 8/24/2021. He did have subsequent incision of bladder neck contracture on 9/17/2021. Follow-up cystoscopy on 11/15/2021 showed another recurrent bladder neck contracture and he underwent TUR bladder neck contracture on 11/17/2021. Again, as noted above, he has had yet another recurrence of bladder neck contracture. He does void small amounts through his urethra. He complains of some dysuria near the end of his penis. He reports only unplugging his catheter once a day to drain. He keeps a dressing on his site at all times. At his last appointment, Dr. Leticia Acuna did discuss further treatment options with him including leaving the suprapubic tube in place to be changed once a month and voiding spontaneously the best he can versus another TUR bladder neck contracture although there is a high risk for recurrence versus a more aggressive approach to making a referral to a specialist for consideration of a UroLume urethral stent placement. Given the patient's age and current health issues, he has chosen to go with a more conservative route and maintain the suprapubic catheter. His catheter has been changed here in the office today. There is a peristomal hernia around his cystotomy site. It is small, likely 8 to 9 mm and without significant discharge. Urine is yellow and with some sediment. We have flushed his catheter well here in the office today.       Past Medical History:   Diagnosis Date    Arthritis     Bladder neck contracture     Diabetes mellitus ([facility] [address]     History of blood transfusion     Hyperlipidemia     Hypertension     Immunization due     Neuropathy     Palliative care patient 09/20/2021       Past Surgical History:   Procedure Laterality Date    COLONOSCOPY      CYSTOSCOPY N/A 8/24/2021    SUPRAPUBIC TUBE PLACEMENT performed by Stefania Cooper MD at [address] [place] [address] N/A 9/16/2021    CYSTOSCOPY TRANSURETHRAL inison BLADDER Bladder neck contracture ; exchange supra pubic catheter performed by Stefania Cooper MD at [address] [place] [address] N/A 11/16/2021    CYSTOSCOPY TRANSURETHRAL EXCISION BLADDER NECK, SUPRAPUBIC CATHETER EXCHANGE (16f) performed by Stefania Cooper MD at [address] [place] [address] ENDOSCOPY, COLON, DIAGNOSTIC      EYE SURGERY Bilateral     cataract    NOSE SURGERY      SKIN BIOPSY      TONSILLECTOMY      TURP N/A 4/14/2021    TRANSURETHRAL RESECTION PROSTATE performed by Stefania Cooper MD at [facility] OR       Current Outpatient Medications   Medication Sig Dispense Refill    aspirin 81 MG EC tablet Take 81 mg by mouth daily      lisinopril (PRINIVIL;ZESTRIL) 5 MG tablet Take 5 mg by mouth daily      memantine (NAMENDA) 10 MG tablet Take 10 mg by mouth 2 times daily      vitamin E 400 UNIT capsule Take 400 Units by mouth daily      hypromellose (ISOPTO TEARS) 0.5 % ophthalmic solution Place 2 drops into both eyes 2 times daily      Cranberry 500 MG TABS Take 1 tablet by mouth daily      Multiple Vitamins-Minerals (MULTIVITAMIN MEN 50+) TABS Take 1 tablet by mouth daily      omeprazole (PRILOSEC) 20 MG delayed release capsule Take 20 mg by mouth daily       mirtazapine (REMERON) 30 MG tablet Take 15 mg by mouth nightly       atorvastatin (LIPITOR) 20 MG tablet Take 20 mg by mouth daily Take 1/2 tab at bedtime.        verapamil (CALAN) 120 MG tablet Take 120 mg by mouth 2 times daily       donepezil (ARICEPT) 10 MG tablet Take 10 mg by mouth nightly       melatonin 3 MG TABS tablet Take 3 mg by mouth nightly as needed Take 2 hs prn       metFORMIN (GLUCOPHAGE) 1000 MG tablet Take 500 mg by mouth 2 times daily (with meals)        No current facility-administered medications for this visit. Allergies   Allergen Reactions    Pcn [Penicillins] Hives     \"Huge dose of PCN\"       Social History     Socioeconomic History    Marital status:      Spouse name: None    Number of children: 0    Years of education: None    Highest education level: None   Occupational History    None   Tobacco Use    Smoking status: Former Smoker     Types: Cigars     Quit date:      Years since quittin.3    Smokeless tobacco: Never Used   Vaping Use    Vaping Use: Never used   Substance and Sexual Activity    Alcohol use: Not Currently    Drug use: Never    Sexual activity: None   Other Topics Concern    None   Social History Narrative    None     Social Determinants of Health     Financial Resource Strain:     Difficulty of Paying Living Expenses: Not on file   Food Insecurity:     Worried About Running Out of Food in the Last Year: Not on file    [place] of Food in the Last Year: Not on file   Transportation Needs:     Lack of Transportation (Medical): Not on file    Lack of Transportation (Non-Medical):  Not on file   Physical Activity:     Days of Exercise per Week: Not on file    Minutes of Exercise per Session: Not on file   Stress:     Feeling of Stress : Not on file   Social Connections:     Frequency of Communication with Friends and Family: Not on file    Frequency of Social Gatherings with Friends and Family: Not on file    Attends Mosque Services: Not on file    Active Member of Clubs or Organizations: Not on file    Attends Club or Organization Meetings: Not on file    Marital Status: Not on file   Intimate Partner Violence:     Fear of Current or Ex-Partner: Not on file    Emotionally Abused: Not on file    Physically Abused: Not on file    Sexually Abused: Not on file Housing Stability:     Unable to Pay for Housing in the Last Year: Not on file    Number of Places Lived in the Last Year: Not on file    Unstable Housing in the Last Year: Not on file       History reviewed. No pertinent family history. REVIEW OF SYSTEMS:  Review of Systems   Reason unable to perform ROS: Patient is a very poor historian and it is difficult to collect review of systems from him. His neighbor, Vel Rodas, accompanies him to the appointment but also is somewhat of a poor historian. Complains of some mild burning in the penis, however, no other significant complaints. PHYSICAL EXAM:  /67 (Site: Right Upper Arm, Position: Sitting, Cuff Size: Medium Adult)   Temp 97 °F (36.1 °C) (Temporal)   Ht 5' 7\" (1.702 m)   Wt 139 lb 3.2 oz (63.1 kg)   BMI 21.80 kg/m²   Physical Exam  Vitals and nursing note reviewed. Constitutional:       General: He is not in acute distress. Appearance: Normal appearance. He is not ill-appearing. Pulmonary:      Effort: Pulmonary effort is normal. No respiratory distress. Abdominal:      General: There is no distension. Tenderness: There is no abdominal tenderness. There is no right CVA tenderness or left CVA tenderness. Genitourinary:     Comments: SPT in place. Peristomal hernia as noted in HPI. Neurological:      Mental Status: He is alert and oriented to person, place, and time. Mental status is at baseline. Psychiatric:         Mood and Affect: Mood normal.         Behavior: Behavior normal.         1. Chronic suprapubic catheter [facility])  Patient will continue with suprapubic catheter. He prefers to keep the catheter plugged. I have explained to him that it is important to drain his bladder at least once a day, but preferably 2-3 times daily. He voices understanding, however, I am unsure if he will follow through with these instructions. 2. Bladder neck contracture  Recurrent bladder neck contracture.   Not seeking any aggressive treatment at the moment. At least 35 minutes were spent on the day of the visit reviewing the patient's past medical records/imaging, speaking face to face with the patient, and charting in the post visit period. No orders of the defined types were placed in this encounter. Return in about 4 weeks (around 5/17/2022) for catheter change. All information inputted into the note by the MA to include chief complaint, past medical history, past surgical history, medications, allergies, social and family history and review of systems has been reviewed and updated as needed by me. EMR Dragon/transcription disclaimer: Much of this documentt is electronic  transcription/translation of spoken language to printed text. The  electronic translation of spoken language may be erroneous, or at times,  nonsensical words or phrases may be inadvertently transcribed.  Although I  have reviewed the document for such errors, some may still exist.

## 2022-04-19 ENCOUNTER — OFFICE VISIT (OUTPATIENT)
Dept: UROLOGY | Age: 87
End: 2022-04-19
Payer: MEDICARE

## 2022-04-19 VITALS
SYSTOLIC BLOOD PRESSURE: 125 MMHG | BODY MASS INDEX: 21.85 KG/M2 | TEMPERATURE: 97 F | DIASTOLIC BLOOD PRESSURE: 67 MMHG | WEIGHT: 139.2 LBS | HEIGHT: 67 IN

## 2022-04-19 DIAGNOSIS — N32.0 BLADDER NECK CONTRACTURE: ICD-10-CM

## 2022-04-19 DIAGNOSIS — Z93.59 CHRONIC SUPRAPUBIC CATHETER (HCC): Primary | ICD-10-CM

## 2022-04-19 PROCEDURE — 1036F TOBACCO NON-USER: CPT | Performed by: NURSE PRACTITIONER

## 2022-04-19 PROCEDURE — G8420 CALC BMI NORM PARAMETERS: HCPCS | Performed by: NURSE PRACTITIONER

## 2022-04-19 PROCEDURE — 4040F PNEUMOC VAC/ADMIN/RCVD: CPT | Performed by: NURSE PRACTITIONER

## 2022-04-19 PROCEDURE — G8427 DOCREV CUR MEDS BY ELIG CLIN: HCPCS | Performed by: NURSE PRACTITIONER

## 2022-04-19 PROCEDURE — 99214 OFFICE O/P EST MOD 30 MIN: CPT | Performed by: NURSE PRACTITIONER

## 2022-04-19 PROCEDURE — 51705 CHANGE OF BLADDER TUBE: CPT | Performed by: NURSE PRACTITIONER

## 2022-04-19 PROCEDURE — 1123F ACP DISCUSS/DSCN MKR DOCD: CPT | Performed by: NURSE PRACTITIONER

## 2022-04-19 NOTE — PROGRESS NOTES
Chronic Suprapubic Catheter   Patient presents today with a history of a chronic suprapubic catheter for several months. The etiology is felt to be due to: bladder neck contracture. Overall, the problem is unchanged. Procedure Note (4/19/22): The patient's suprapubic catheter plug was removed and 250cc was drained from bladder and then catheter was removed. Using sterile technique patient was cleansed with Hibiclens and sterile water solution, 16 Mongolian SP catheter was inserted into the bladder, urine was drained from bladder, then hand irrigated with sterile water due to bladder debris. 10 mL of sterile water was used to fill up the catheter balloon and catheter was then hooked up to drainage bag. The patient tolerated the procedure well. Patient will follow up in 1 month for a catheter change.

## 2022-04-29 ENCOUNTER — TELEPHONE (OUTPATIENT)
Dept: UROLOGY | Age: 87
End: 2022-04-29

## 2022-04-29 NOTE — TELEPHONE ENCOUNTER
Chief Complaint  Patient presents for depo injection  Given IM in right deltoid  Patient tolerated injection well  RTO 12 weeks  Active Problems    1  Depo contraception (V25 49) (Z30 40)   2  Dysmenorrhea (625 3) (N94 6)   3  Endometriosis (617 9) (N80 9)   4  Heavy Bleeding Between Periods (Metrorrhagia) (626 6)   5  Irregular bleeding (626 4) (N92 6)   6  Mastodynia (611 71) (N64 4)   7  Pelvic and perineal pain (625 9) (R10 2)    Current Meds   1  Ibuprofen 600 MG Oral Tablet; TAKE 1 TABLET EVERY 6 HOURS AS NEEDED; Therapy: [id number] to (Evaluate:10Jan2014)  Requested for: 12Sep2013; Last   Rx:12Sep2013 Ordered   2  MedroxyPROGESTERone Acetate 150 MG/ML Intramuscular Suspension; INJECT   INTRAMUSCULARLY EVERY 12 WEEKS AS DIRECTED; Therapy: [id number] to (Ni Quinn)  Requested for: 21Nov2016; Last   Rx:21Nov2016 Ordered    Allergies    1  Amoxil CAPS   2  Bactrim DS TABS   3  Erythromycin Base TABS    Signatures   Electronically signed by : Sullivan Eisenmenger, ; Jan 9 2017  2:14PM EST                       (Co-author)    Electronically signed by :  JYOTHI Woods ; Master 10 2017  1:53PM EST                       (Author) Eric Or from [address] [place] [address] Street called requesting last office notes on 04/19/2022. Please fax to [phone]. Please contact @ [phone] if needed.     Thank you

## 2022-05-03 ENCOUNTER — TELEPHONE (OUTPATIENT)
Dept: UROLOGY | Age: 87
End: 2022-05-03

## 2022-05-03 NOTE — TELEPHONE ENCOUNTER
Pt's friend Maximino Rust called stating that his leg bag has broken, would like a new one sent to [facility] in [facility]. Please call Maximino Rust to clarify.

## 2022-05-25 ENCOUNTER — NURSE ONLY (OUTPATIENT)
Dept: UROLOGY | Age: 87
End: 2022-05-25
Payer: MEDICARE

## 2022-05-25 DIAGNOSIS — N32.0 BLADDER NECK CONTRACTURE: ICD-10-CM

## 2022-05-25 PROCEDURE — 51705 CHANGE OF BLADDER TUBE: CPT | Performed by: NURSE PRACTITIONER

## 2022-05-25 NOTE — PROGRESS NOTES
Chronic Suprapubic Catheter   Patient presents today with a history of a chronic suprapubic catheter for several months. The etiology is felt to be due to: bladder neck contracture. Overall, the problem is unchanged. Procedure Note (5/25/22): The patient's suprapubic catheter plug was removed and 250cc was drained from bladder and then catheter was removed. Using sterile technique patient was cleansed with Hibiclens and sterile water solution, 16 Amharic SP catheter was inserted into the bladder, urine was drained from bladder, then hand irrigated with sterile water due to bladder debris. 10 mL of sterile water was used to fill up the catheter balloon and catheter was then hooked up to drainage bag. The patient tolerated the procedure well. Patient will follow up in 1 month for a catheter change.

## 2022-06-28 ENCOUNTER — NURSE ONLY (OUTPATIENT)
Dept: UROLOGY | Age: 87
End: 2022-06-28
Payer: MEDICARE

## 2022-06-28 VITALS — TEMPERATURE: 97.9 F

## 2022-06-28 DIAGNOSIS — N32.0 BLADDER NECK CONTRACTURE: ICD-10-CM

## 2022-06-28 PROCEDURE — 51705 CHANGE OF BLADDER TUBE: CPT | Performed by: NURSE PRACTITIONER

## 2022-07-14 ENCOUNTER — TELEPHONE (OUTPATIENT)
Dept: UROLOGY | Age: 87
End: 2022-07-14

## 2022-07-14 NOTE — TELEPHONE ENCOUNTER
PTS CARE GIVER CALLED STATING THAT PT IS HAVING LOTS OF PAIN AND BURNING WHEN URINATING. PT HAS BEEN TAKING AZO AND SYMPTOMS ARE GETTING WORSE. PTS CARE GIVER WANTS TO TALK TO A NURSE OVER PTS ISSUES.  SHARI CALL HIM BACK

## 2022-07-15 ENCOUNTER — OFFICE VISIT (OUTPATIENT)
Dept: UROLOGY | Age: 87
End: 2022-07-15
Payer: MEDICARE

## 2022-07-15 DIAGNOSIS — N32.0 BLADDER NECK CONTRACTURE: Primary | ICD-10-CM

## 2022-07-15 PROCEDURE — 1123F ACP DISCUSS/DSCN MKR DOCD: CPT | Performed by: NURSE PRACTITIONER

## 2022-07-15 PROCEDURE — 51705 CHANGE OF BLADDER TUBE: CPT | Performed by: NURSE PRACTITIONER

## 2022-07-15 PROCEDURE — 51798 US URINE CAPACITY MEASURE: CPT | Performed by: NURSE PRACTITIONER

## 2022-07-15 PROCEDURE — 99214 OFFICE O/P EST MOD 30 MIN: CPT | Performed by: NURSE PRACTITIONER

## 2022-07-15 ASSESSMENT — ENCOUNTER SYMPTOMS
ABDOMINAL PAIN: 1
NAUSEA: 0
BACK PAIN: 0
ABDOMINAL DISTENTION: 0
VOMITING: 0

## 2022-07-15 NOTE — TELEPHONE ENCOUNTER
CALLED PATIENT TODAY TO SEE HOW HE WAS DOING AND HE SAID NOT GOOD, NO PEE IN BAG. PATIENT IS COMING IN TODAY TO MAKE SURE  CATH IS STILL IN PLACE.

## 2022-07-15 NOTE — PROGRESS NOTES
Chronic Suprapubic Catheter   Patient presents today with a history of a chronic suprapubic catheter for several months. The etiology is felt to be due to: Neurogenic bladder. Overall, the problem is unchanged. Procedure Note (7/15/22): The patient's suprapubic catheter was removed. Using sterile technique patient was cleansed with Hibiclens and sterile water solution, 18 Citizen of [place] SP catheter was inserted into the bladder, urine was drained from bladder, 10 mL of sterile water was used to fill up the catheter balloon. Nurse irrigated 1000cc to flush the catheter. The catheter was then hooked up to drainage bag. The patient tolerated the procedure well. BRYAN Veras was in office at time of procedure. Patient will follow up in 1 Month for a cath change.

## 2022-07-15 NOTE — PROGRESS NOTES
Santy Batista is a 80 y.o., male, Established patient who presents today   Chief Complaint   Patient presents with    Follow-up     Issues with cath        HPI   Patient presents for evaluation in office today after neighbor/caretaker alerted our office that patient was complaining of increased voiding through his urethra. Neighbor also reports no output in the catheter bag. Upon evaluation today in the office, his catheter is severely obstructed with what is thought to be sediment from Azo supplementation. He and his neighbor report he has been taking the medication often before burning in his urethra which he has complained about for some time, at least April 2022, which was the last time I saw him in the office. He denies any fevers, chills, nausea, vomiting, hematuria. Patient has a complicated history. He underwent TURP for BPH and urinary retention on 4/14/2021 which resulted in bladder neck contracture and placement of a suprapubic catheter on 8/24/2021. He did have subsequent incision of bladder neck contracture on 9/17/2021. Follow-up cystoscopy on 11/15/2021 revealed another recurrent bladder neck contracture and he underwent TUR bladder neck contracture on 11/17/2021. Cystoscopic evaluation in January 2022 also revealed recurrent bladder neck contracture. At that time, Dr. Jessica Sarkar placed a suprapubic tube and the patient has been coming here for monthly catheter changes since that time. REVIEW OF SYSTEMS:  Review of Systems   Constitutional:  Negative for chills and fever. Gastrointestinal:  Positive for abdominal pain. Negative for abdominal distention, nausea and vomiting. Genitourinary:  Positive for difficulty urinating. Negative for dysuria, flank pain, frequency, hematuria and urgency. Musculoskeletal:  Negative for back pain and gait problem. Neurological:  Positive for weakness. Psychiatric/Behavioral:  Positive for confusion. Negative for agitation.       PHYSICAL EXAM:  There were no vitals taken for this visit. Physical Exam  Vitals and nursing note reviewed. Constitutional:       General: He is not in acute distress. Appearance: Normal appearance. He is not ill-appearing. Pulmonary:      Effort: Pulmonary effort is normal. No respiratory distress. Abdominal:      General: There is no distension. Tenderness: There is no abdominal tenderness. There is no right CVA tenderness or left CVA tenderness. Genitourinary:     Comments: SP tube noted. Some mild bleeding around catheter insertion site  Neurological:      Mental Status: He is alert and oriented to person, place, and time. Mental status is at baseline. Psychiatric:         Mood and Affect: Mood normal.         Behavior: Behavior normal.       ASSESSMENT/PLAN  1. Bladder neck contracture  Patient presents today for Harper catheter problem. Upon evaluation, it was noted that his Harper catheter was clogged with sediment from presumed Azo supplementation. His catheter has been changed today and he has been irrigated with more than 2000 mL of sterile water until his urine was clear. There was no significant sediment noted within the patient's bladder, only within the catheter tubing in the catheter bag. His catheter size was increased from 16 Western Lanie to 25 Western Lanie today. I have instructed the patient to discontinue Azo supplementation. We will plan for Harper catheter change in 1 month. Should he have another episode of patency issues, we will initiate catheter flushing at home. His neighbor/caretaker is willing to learn this task. No orders of the defined types were placed in this encounter. No follow-ups on file. An electronic signature was used to authenticate this note.     BRYAN ROJAS - CNP    All information inputted into the note by the MA to include chief complaint, past medical history, past surgical history, medications, allergies, social and family history and review of systems has been reviewed and updated as needed by me. EMR Dragon/transcription disclaimer: Much of this document is electronic transcription/translation of spoken language to printed text. The electronic translation of spoken language may be erroneous or, at times, nonsensical words or phrases may be inadvertently transcribed.  Although I have reviewed the document for such errors, some may still exist.

## 2022-08-03 ENCOUNTER — OFFICE VISIT (OUTPATIENT)
Dept: UROLOGY | Facility: CLINIC | Age: 87
End: 2022-08-03

## 2022-08-03 ENCOUNTER — TELEPHONE (OUTPATIENT)
Dept: UROLOGY | Age: 87
End: 2022-08-03

## 2022-08-03 ENCOUNTER — OFFICE VISIT (OUTPATIENT)
Dept: UROLOGY | Age: 87
End: 2022-08-03
Payer: MEDICARE

## 2022-08-03 DIAGNOSIS — N32.0 ACQUIRED CONTRACTURE OF BLADDER NECK: ICD-10-CM

## 2022-08-03 DIAGNOSIS — Z93.59 CHRONIC SUPRAPUBIC CATHETER (HCC): Primary | ICD-10-CM

## 2022-08-03 DIAGNOSIS — R33.9 URINARY RETENTION: Primary | ICD-10-CM

## 2022-08-03 DIAGNOSIS — T83.9XXD FOLEY CATHETER PROBLEM, SUBSEQUENT ENCOUNTER: ICD-10-CM

## 2022-08-03 PROCEDURE — 51710 CHANGE OF BLADDER TUBE: CPT | Performed by: UROLOGY

## 2022-08-03 PROCEDURE — G8420 CALC BMI NORM PARAMETERS: HCPCS | Performed by: NURSE PRACTITIONER

## 2022-08-03 PROCEDURE — G8428 CUR MEDS NOT DOCUMENT: HCPCS | Performed by: NURSE PRACTITIONER

## 2022-08-03 PROCEDURE — 99214 OFFICE O/P EST MOD 30 MIN: CPT | Performed by: NURSE PRACTITIONER

## 2022-08-03 PROCEDURE — 99202 OFFICE O/P NEW SF 15 MIN: CPT | Performed by: UROLOGY

## 2022-08-03 PROCEDURE — 1123F ACP DISCUSS/DSCN MKR DOCD: CPT | Performed by: NURSE PRACTITIONER

## 2022-08-03 PROCEDURE — 1036F TOBACCO NON-USER: CPT | Performed by: NURSE PRACTITIONER

## 2022-08-03 RX ORDER — FINASTERIDE 1 MG/1
1 TABLET, FILM COATED ORAL DAILY
COMMUNITY

## 2022-08-03 RX ORDER — ATORVASTATIN CALCIUM 10 MG/1
10 TABLET, FILM COATED ORAL DAILY
COMMUNITY

## 2022-08-03 NOTE — TELEPHONE ENCOUNTER
Dodie Hodgson called in regards to patient. He is requesting a return call from [place]. He mentioned about tube and if he should bring patient in to be seen.  Please return his call to discuss [phone]      Thank you

## 2022-08-03 NOTE — PROGRESS NOTES
Chief Complaint  Catheter is stuck    Subjective          Adeel Waters presents to [facility]   88-year-old male with history of severe bladder neck contracture.  Bladder is managed with suprapubic cystostomy done by Dr. Jimenez approximately 6 months ago.  Patient said that the catheter stopped draining about 12 hours ago.  He has had no leakage per penis.  Complaining of severe penile pain.        Current Outpatient Medications:   •  atorvastatin (LIPITOR) 10 MG tablet, Take 10 mg by mouth Daily., Disp: , Rfl:   •  finasteride (PROPECIA) 1 MG tablet, Take 1 mg by mouth Daily., Disp: , Rfl:   •  metFORMIN (GLUCOPHAGE) 500 MG tablet, Take 500 mg by mouth 2 (Two) Times a Day With Meals., Disp: , Rfl:   Past Medical History:   Diagnosis Date   • Retention of urine      Past Surgical History:   Procedure Laterality Date   • TURP / TRANSURETHRAL INCISION / DRAINAGE PROSTATE             Review  of systems  Constitutional: Negative for chills or fever.   Gastrointestinal: Negative for abdominal pain, anal bleeding or blood in stool.           Objective   PHYSICAL EXAM  Vital Signs:   There were no vitals taken for this visit.    Constitutional: Patient is without distress or deformity.  Vital signs are reviewed as above.    Neuro: No confusion; No disorientation; Alert and oriented  Pulmonary: No respiratory distress.   Skin: No pallor or diaphoresis  GI: Abdomen is soft and nontender.  No significant distention.  No hernias noted.  : Penis and testicles are normal.  Bladder is palpably distended.  The has a superpubic catheter in place that the tip of the catheter has been excised by the staff at [facility] and attempted to get the balloon to deflate.          DATA  Result Review :              Results for orders placed or performed in visit on 03/14/17   Tissue Exam    Specimen: Small Intestine, Duodenum; Tissue   Result Value Ref Range    Case Report       Surgical Pathology Report            "              Case: [id number]                                  Authorizing Provider:  Submitter Client           Collected:           03/14/2017 01:06 PM          Pathologist:           Rene Fernandes MD       Received:            03/15/2017 08:07 AM          Specimen:    Small Intestine, Duodenum, Duodenum Polyp                                                  Clinical Information       Pre-Op Diagnosis:     reflux, dyspepsia.  Post-Op Diagnosis:    Duodenal polyp.          Final Diagnosis       Polyp, duodenum, biopsy:       Extensively cauterized benign gastric mucosa with reactive features.                                              1      Gross Description       Specimen #1 is received in formalin, labeled with the patient's name, date of birth, medical record number and \"duodenum polyp\".  The specimen consists of one tan-pink soft tissue polyp measuring 0.4 x 0.2 x 0.2 cm.  The polyp is totally in (block 1A).   JBT/ltw           Microscopic Description       Sections demonstrate a single extensively cauterized polypoid fragment of gastric mucosa with features of reactive gastropathy.  Negative for dysplasia.    DRW/ltw          Embedded Images              ASSESSMENT AND PLAN          Problem List Items Addressed This Visit    None     Visit Diagnoses     Urinary retention    -  Primary    Acquired contracture of bladder neck          The only way this patient has to void is a suprapubic catheter.  I inspected this tube and it appears as though the balloon is somewhat crystallized.  I cut the catheter in two so that I can take a well-lubricated wire and pass it through the balloon port.  Once I did this I could feel what seemed like a crystalline substance that allowed me to remove the wire.  At that point the balloon did seem deflated.  As I pulled the catheter out its very encrusted.  I was able to easily replace the tube with an 18 Danish Venetie tip catheter.  I showed the family how to irrigate the " tube.  This would be a complex SP tube catheter change.  Will defer timing of the tube change to Dr. Jimenez.        FOLLOW UP     No follow-ups on file.        (Please note that portions of this note were completed with a voice recognition program.)  Klever Schilling MD  08/03/22  16:37 CDT

## 2022-08-04 ASSESSMENT — ENCOUNTER SYMPTOMS
NAUSEA: 0
BACK PAIN: 0
ABDOMINAL DISTENTION: 0
VOMITING: 0
ABDOMINAL PAIN: 1

## 2022-08-04 NOTE — PROGRESS NOTES
Raz Bain is a 80 y.o., male, Established patient who presents today No chief complaint on file. HPI   Patient presents today with complaints of loss of patency of his suprapubic catheter. His neighbor alerted in the office earlier today and states he did not note any urine draining into the bag. He reports the last time he noted urine being drained was sometime yesterday afternoon. Patient does live by his self and is a very poor historian and cannot say for sure when his catheter stopped draining. He was seen a couple weeks ago in the office as seen below with similar complaints. At that time, he was taking a significant amount of Pyridium/Azo which was thought to be contributing to his problem. 7/15/22  Patient presents for evaluation in office today after neighbor/caretaker alerted our office that patient was complaining of increased voiding through his urethra. Neighbor also reports no output in the catheter bag. Upon evaluation today in the office, his catheter is severely obstructed with what is thought to be sediment from Azo supplementation. He and his neighbor report he has been taking the medication often before burning in his urethra which he has complained about for some time, at least April 2022, which was the last time I saw him in the office. He denies any fevers, chills, nausea, vomiting, hematuria. Patient has a complicated history. He underwent TURP for BPH and urinary retention on 4/14/2021 which resulted in bladder neck contracture and placement of a suprapubic catheter on 8/24/2021. He did have subsequent incision of bladder neck contracture on 9/17/2021. Follow-up cystoscopy on 11/15/2021 revealed another recurrent bladder neck contracture and he underwent TUR bladder neck contracture on 11/17/2021. Cystoscopic evaluation in January 2022 also revealed recurrent bladder neck contracture.   At that time, Dr. Roe Young placed a suprapubic tube and the patient has been coming here for monthly catheter changes since that time. REVIEW OF SYSTEMS:  Review of Systems   Constitutional:  Negative for chills and fever. Gastrointestinal:  Positive for abdominal pain (catheter not patent- >700ml in bladder). Negative for abdominal distention, nausea and vomiting. Genitourinary:  Positive for penile pain (complains in burning in urethra). Negative for difficulty urinating, dysuria, flank pain, frequency, hematuria and urgency. Musculoskeletal:  Negative for back pain and gait problem. Psychiatric/Behavioral:  Negative for agitation and confusion. PHYSICAL EXAM:  There were no vitals taken for this visit. Physical Exam  Vitals and nursing note reviewed. Constitutional:       General: He is not in acute distress. Appearance: Normal appearance. He is not ill-appearing. Pulmonary:      Effort: Pulmonary effort is normal. No respiratory distress. Abdominal:      General: There is no distension. Tenderness: There is no abdominal tenderness. There is no right CVA tenderness or left CVA tenderness. Neurological:      Mental Status: He is alert and oriented to person, place, and time. Mental status is at baseline. Psychiatric:         Mood and Affect: Mood normal.         Behavior: Behavior normal.       ASSESSMENT/PLAN  1. Chronic suprapubic catheter ([address]  2. Harper catheter problem, subsequent encounter  Patient currently managed with SP tube after several recurrent bladder neck contracture status post TURP. He presented to the clinic today after loss of patency of his catheter. When the medical assistants were unable to remove the catheter, I was called to the room. Unfortunately, I also could not dislodge the catheter without causing extreme amount of pain to the patient. I attempted to irrigate the catheter/bladder, however, there was a significant amount of sediment in the tubing and I was unable to get the catheter draining.     I spoke with Dr. Tiff Carballo at [facility] urology who agreed to see the patient in his office this afternoon as we currently have no urologist on campus. He can continue follow-up in our office after this acute problem has been resolved. No orders of the defined types were placed in this encounter. No follow-ups on file. An electronic signature was used to authenticate this note. ARIELA CHUNG, APRN - CNP    All information inputted into the note by the MA to include chief complaint, past medical history, past surgical history, medications, allergies, social and family history and review of systems has been reviewed and updated as needed by me. EMR Dragon/transcription disclaimer: Much of this document is electronic transcription/translation of spoken language to printed text. The electronic translation of spoken language may be erroneous or, at times, nonsensical words or phrases may be inadvertently transcribed.  Although I have reviewed the document for such errors, some may still exist.

## 2022-08-08 ENCOUNTER — TELEPHONE (OUTPATIENT)
Dept: UROLOGY | Age: 87
End: 2022-08-08

## 2022-08-24 ENCOUNTER — NURSE ONLY (OUTPATIENT)
Dept: UROLOGY | Age: 87
End: 2022-08-24
Payer: MEDICARE

## 2022-08-24 ENCOUNTER — NURSE ONLY (OUTPATIENT)
Dept: UROLOGY | Age: 87
End: 2022-08-24

## 2022-08-24 DIAGNOSIS — T83.9XXD FOLEY CATHETER PROBLEM, SUBSEQUENT ENCOUNTER: ICD-10-CM

## 2022-08-24 DIAGNOSIS — Z93.59 CHRONIC SUPRAPUBIC CATHETER (HCC): ICD-10-CM

## 2022-08-24 DIAGNOSIS — R39.89 BLADDER PAIN: Primary | ICD-10-CM

## 2022-08-24 DIAGNOSIS — R33.9 URINARY RETENTION: ICD-10-CM

## 2022-08-24 DIAGNOSIS — T83.9XXD FOLEY CATHETER PROBLEM, SUBSEQUENT ENCOUNTER: Primary | ICD-10-CM

## 2022-08-24 PROCEDURE — 51703 INSERT BLADDER CATH COMPLEX: CPT | Performed by: NURSE PRACTITIONER

## 2022-08-24 NOTE — PROGRESS NOTES
Chronic Suprapubic Catheter   Patient presents today with a history of a chronic suprapubic catheter for several months. The etiology is felt to be due to: Neurogenic bladder. Overall, the problem is unchanged. Procedure Note (8/24/22): The patient's suprapubic catheter was removed. Using sterile technique patient was cleansed with Hibiclens and sterile water solution, 18 Dominican SP catheter was inserted into the bladder, urine was drained from bladder, 10 mL of sterile water was used to fill up the catheter balloon. Nurse irrigated 1000cc to flush the catheter. The catheter was then hooked up to drainage bag. The patient tolerated the procedure well. BRYAN Betancourt was in office at time of procedure. Patient will follow up in 2 weeks for a cath change and a visit with BRYAN Betancourt.

## 2022-08-25 NOTE — PROGRESS NOTES
Chronic Suprapubic Catheter   Patient presents today with a history of a chronic suprapubic catheter for several months. September 2021  The etiology is felt to be due to: Neurogenic bladder, recurrent bladder neck contracture  Overall, the problem is worse. Patient continues to have issues with catheter patency. He and his neighbor have been flushing the catheter at least twice daily. Procedure Note (8/24/22):  Using sterile technique patient was cleansed with Hibiclens and sterile water solution, the MA attempted to remove the catheter, however, she did meet some resistance when attempting to discontinue the Harper. She also had some difficulty attempting to irrigate the bladder prior to catheter discontinuation. After a couple of attempts to remove the catheter without success, I was called for further evaluation. I recleansed the area with Hibiclens 5 times and proceeded to remove the catheter and successfully replace an 25 [place] L[place] SP catheter into his bladder with return of urine noted. 10 mL of sterile water was used to fill up the balloon and the catheter was hooked to a drainage bag. Upon examination of the catheter, it appears the balloon had become encrusted with urine sediment or bladder debris. This made the catheter changed quite difficult today. He will continue utilizing flushes at home and will return in about 2 weeks for catheter change.

## 2022-09-01 ENCOUNTER — HOSPITAL ENCOUNTER (OUTPATIENT)
Dept: GENERAL RADIOLOGY | Age: 87
Discharge: HOME | End: 2022-09-01
Payer: MEDICARE

## 2022-09-01 DIAGNOSIS — R39.89 BLADDER PAIN: ICD-10-CM

## 2022-09-01 DIAGNOSIS — T83.9XXD FOLEY CATHETER PROBLEM, SUBSEQUENT ENCOUNTER: ICD-10-CM

## 2022-09-01 PROCEDURE — 74176 CT ABD & PELVIS W/O CONTRAST: CPT | Performed by: RADIOLOGY

## 2022-09-01 PROCEDURE — 74176 CT ABD & PELVIS W/O CONTRAST: CPT

## 2022-09-02 ENCOUNTER — OFFICE VISIT (OUTPATIENT)
Dept: UROLOGY | Age: 87
End: 2022-09-02
Payer: MEDICARE

## 2022-09-02 DIAGNOSIS — T83.518A PURPLE URINE BAG SYNDROME (HCC): ICD-10-CM

## 2022-09-02 DIAGNOSIS — Z93.59 CHRONIC SUPRAPUBIC CATHETER (HCC): ICD-10-CM

## 2022-09-02 DIAGNOSIS — N32.0 BLADDER NECK CONTRACTURE: ICD-10-CM

## 2022-09-02 DIAGNOSIS — T83.9XXD FOLEY CATHETER PROBLEM, SUBSEQUENT ENCOUNTER: ICD-10-CM

## 2022-09-02 DIAGNOSIS — N39.0 PURPLE URINE BAG SYNDROME (HCC): ICD-10-CM

## 2022-09-02 DIAGNOSIS — R33.9 URINARY RETENTION: Primary | ICD-10-CM

## 2022-09-02 PROCEDURE — G2212 PROLONG OUTPT/OFFICE VIS: HCPCS | Performed by: NURSE PRACTITIONER

## 2022-09-02 PROCEDURE — 1123F ACP DISCUSS/DSCN MKR DOCD: CPT | Performed by: NURSE PRACTITIONER

## 2022-09-02 PROCEDURE — 1036F TOBACCO NON-USER: CPT | Performed by: NURSE PRACTITIONER

## 2022-09-02 PROCEDURE — G8420 CALC BMI NORM PARAMETERS: HCPCS | Performed by: NURSE PRACTITIONER

## 2022-09-02 PROCEDURE — G8428 CUR MEDS NOT DOCUMENT: HCPCS | Performed by: NURSE PRACTITIONER

## 2022-09-02 PROCEDURE — 99215 OFFICE O/P EST HI 40 MIN: CPT | Performed by: NURSE PRACTITIONER

## 2022-09-02 PROCEDURE — 51705 CHANGE OF BLADDER TUBE: CPT | Performed by: NURSE PRACTITIONER

## 2022-09-02 RX ORDER — MAGNESIUM HYDROXIDE 1200 MG/15ML
120 LIQUID ORAL 2 TIMES DAILY
Qty: 7200 ML | Refills: 11 | Status: SHIPPED | OUTPATIENT
Start: 2022-09-02 | End: 2023-08-28

## 2022-09-02 ASSESSMENT — ENCOUNTER SYMPTOMS
VOMITING: 0
BACK PAIN: 0
ABDOMINAL DISTENTION: 1
ABDOMINAL PAIN: 1
NAUSEA: 0

## 2022-09-02 NOTE — PROGRESS NOTES
Chronic Suprapubic Catheter   Patient presents today with a history of a chronic suprapubic catheter for several months. The etiology is felt to be due to: Neurogenic bladder. Overall, the problem is unchanged. Procedure Note (9/2/22): The patient's suprapubic catheter was removed. Using sterile technique patient was cleansed with Hibiclens and sterile water solution, 18 Pashto SP catheter was inserted into the bladder, urine was drained from bladder, 10 mL of sterile water was used to fill up the catheter balloon. Nurse irrigated 1000cc to flush the catheter. The catheter was then hooked up to drainage bag. The patient tolerated the procedure well. BRYAN Reyes was in office at time of procedure. Patient will follow up in 2 weeks for a cath change and a visit with BRYAN Reyes.

## 2022-09-02 NOTE — PROGRESS NOTES
Carla Cabral is a 80 y.o., male, Established patient who presents today   Chief Complaint   Patient presents with    Follow-up     I am here today because my cath isn't draining       HPI   Patient presents today for follow-up of CT imaging as well as complaints of loss of patency of his suprapubic catheter. This is an ongoing problem and he has had more difficulty with catheter patency since July. This is why he was evaluated with CT imaging. His neighbor, Flakita Carmichael, does provide most of the care for the patient as he has a history of dementia. Flakita Carmichael has been flushing the catheter at least daily since early August which has only mildly helped his symptoms. He had previously been taking a large amount of Pyridium, and this was thought to have exacerbated the issue, but this has been discontinued since mid July without significant change or resolution of problems. He does have a rather complicated history. 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 He did have subsequent incision of the bladder neck contracture on 9/17/2021. Follow-up cystoscopy on 11/15/2021 revealed another recurrent bladder neck contracture and he underwent TUR bladder neck contracture on 11/17/2021. Cystoscopic evaluation in January 2022 also revealed recurrent bladder neck contracture. At that time, Dr. Donna Marcum placed a suprapubic tube and the patient has been coming here for monthly catheter changes since that time. CT imaging from yesterday reveals small punctate stones in bilateral kidneys as well as multiple bladder stones. There are also several posterior bladder diverticula noted. REVIEW OF SYSTEMS:  Review of Systems   Constitutional:  Negative for chills and fever. Gastrointestinal:  Positive for abdominal distention (mild) and abdominal pain. Negative for nausea and vomiting.    Genitourinary:  Positive for (MULTIVITAMIN MEN 50+) TABS Take 1 tablet by mouth daily      omeprazole (PRILOSEC) 20 MG delayed release capsule Take 20 mg by mouth daily       mirtazapine (REMERON) 30 MG tablet Take 15 mg by mouth nightly       atorvastatin (LIPITOR) 20 MG tablet Take 20 mg by mouth daily Take 1/2 tab at bedtime. verapamil (CALAN) 120 MG tablet Take 120 mg by mouth 2 times daily       donepezil (ARICEPT) 10 MG tablet Take 10 mg by mouth nightly       melatonin 3 MG TABS tablet Take 3 mg by mouth nightly as needed Take 2 hs prn       metFORMIN (GLUCOPHAGE) 1000 MG tablet Take 500 mg by mouth 2 times daily (with meals)        No current facility-administered medications for this visit. Allergies   Allergen Reactions    Pcn [Penicillins] Hives     \"Huge dose of PCN\"       Social History     Socioeconomic History    Marital status:     Number of children: 0   Tobacco Use    Smoking status: Former     Types: Cigars     Quit date:      Years since quittin.6    Smokeless tobacco: Never   Vaping Use    Vaping Use: Never used   Substance and Sexual Activity    Alcohol use: Not Currently    Drug use: Never       No family history on file. PHYSICAL EXAM:  There were no vitals taken for this visit. Physical Exam  Vitals and nursing note reviewed. Constitutional:       General: He is not in acute distress. Appearance: Normal appearance. He is not ill-appearing. Pulmonary:      Effort: Pulmonary effort is normal. No respiratory distress. Abdominal:      General: There is distension. Tenderness: There is no abdominal tenderness. There is no right CVA tenderness or left CVA tenderness. Genitourinary:     Comments: Burning at tip of penis  Neurological:      Mental Status: He is alert and oriented to person, place, and time. Mental status is at baseline. Psychiatric:         Mood and Affect: Mood normal.         Behavior: Behavior normal.       IMAGING:  Impression   1.  Multiple 1-2 mm Answer:   new indwelling FC        No follow-ups on file. An electronic signature was used to authenticate this note. ARIELA CHUNG, APRN - CNP    All information inputted into the note by the MA to include chief complaint, past medical history, past surgical history, medications, allergies, social and family history and review of systems has been reviewed and updated as needed by me. EMR Dragon/transcription disclaimer: Much of this document is electronic transcription/translation of spoken language to printed text. The electronic translation of spoken language may be erroneous or, at times, nonsensical words or phrases may be inadvertently transcribed.  Although I have reviewed the document for such errors, some may still exist.

## 2022-09-02 NOTE — H&P (VIEW-ONLY)
Catalino Byrne is a 80 y.o., male, Established patient who presents today   Chief Complaint   Patient presents with    Follow-up     I am here today because my cath isn't draining       HPI   Patient presents today for follow-up of CT imaging as well as complaints of loss of patency of his suprapubic catheter. This is an ongoing problem and he has had more difficulty with catheter patency since July. This is why he was evaluated with CT imaging. His neighbor, Linda Silverio, does provide most of the care for the patient as he has a history of dementia. Linda Silverio has been flushing the catheter at least daily since early August which has only mildly helped his symptoms. He had previously been taking a large amount of Pyridium, and this was thought to have exacerbated the issue, but this has been discontinued since mid July without significant change or resolution of problems. He does have a rather complicated history. 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 He did have subsequent incision of the bladder neck contracture on 9/17/2021. Follow-up cystoscopy on 11/15/2021 revealed another recurrent bladder neck contracture and he underwent TUR bladder neck contracture on 11/17/2021. Cystoscopic evaluation in January 2022 also revealed recurrent bladder neck contracture. At that time, Dr. Denisse Crockett placed a suprapubic tube and the patient has been coming here for monthly catheter changes since that time. CT imaging from yesterday reveals small punctate stones in bilateral kidneys as well as multiple bladder stones. There are also several posterior bladder diverticula noted. REVIEW OF SYSTEMS:  Review of Systems   Constitutional:  Negative for chills and fever. Gastrointestinal:  Positive for abdominal distention (mild) and abdominal pain. Negative for nausea and vomiting.    Genitourinary:  Positive for difficulty urinating and dysuria (continuous burning at tip of penis). Negative for flank pain, frequency, hematuria and urgency. Purple bag   Musculoskeletal:  Negative for back pain and gait problem. Psychiatric/Behavioral:  Negative for agitation and confusion. Past Medical History:   Diagnosis Date    Arthritis     Bladder neck contracture     Diabetes mellitus ([address]     History of blood transfusion     Hyperlipidemia     Hypertension     Immunization due     Neuropathy     Palliative care patient 09/20/2021       Past Surgical History:   Procedure Laterality Date    COLONOSCOPY      CYSTOSCOPY N/A 8/24/2021    SUPRAPUBIC TUBE PLACEMENT performed by Charleen Orozco MD at [address] N/A 9/16/2021    CYSTOSCOPY TRANSURETHRAL inison BLADDER Bladder neck contracture ; exchange supra pubic catheter performed by Charleen Orozco MD at [address] N/A 11/16/2021    CYSTOSCOPY TRANSURETHRAL EXCISION BLADDER NECK, SUPRAPUBIC CATHETER EXCHANGE (16f) performed by Charleen Orozco MD at . [address], COLON, DIAGNOSTIC      EYE SURGERY Bilateral     cataract    NOSE SURGERY      SKIN BIOPSY      TONSILLECTOMY      TURP N/A 4/14/2021    TRANSURETHRAL RESECTION PROSTATE performed by Charleen Orozco MD at [facility] OR       Current Outpatient Medications   Medication Sig Dispense Refill    Water For Irrigation, Sterile (STERILE WATER FOR IRRIGATION) Irrigate with 120 mLs as directed 2 times daily Irrigate with as directed for 1 dose.  7200 mL 11    aspirin 81 MG EC tablet Take 81 mg by mouth daily      lisinopril (PRINIVIL;ZESTRIL) 5 MG tablet Take 5 mg by mouth daily      memantine (NAMENDA) 10 MG tablet Take 10 mg by mouth 2 times daily      vitamin E 400 UNIT capsule Take 400 Units by mouth daily      hypromellose (ISOPTO TEARS) 0.5 % ophthalmic solution Place 2 drops into both eyes 2 times daily      Cranberry 500 MG TABS Take 1 tablet by mouth daily      Multiple Vitamins-Minerals (MULTIVITAMIN MEN 50+) TABS Take 1 tablet by mouth daily      omeprazole (PRILOSEC) 20 MG delayed release capsule Take 20 mg by mouth daily       mirtazapine (REMERON) 30 MG tablet Take 15 mg by mouth nightly       atorvastatin (LIPITOR) 20 MG tablet Take 20 mg by mouth daily Take 1/2 tab at bedtime. verapamil (CALAN) 120 MG tablet Take 120 mg by mouth 2 times daily       donepezil (ARICEPT) 10 MG tablet Take 10 mg by mouth nightly       melatonin 3 MG TABS tablet Take 3 mg by mouth nightly as needed Take 2 hs prn       metFORMIN (GLUCOPHAGE) 1000 MG tablet Take 500 mg by mouth 2 times daily (with meals)        No current facility-administered medications for this visit. Allergies   Allergen Reactions    Pcn [Penicillins] Hives     \"Huge dose of PCN\"       Social History     Socioeconomic History    Marital status:     Number of children: 0   Tobacco Use    Smoking status: Former     Types: Cigars     Quit date:      Years since quittin.6    Smokeless tobacco: Never   Vaping Use    Vaping Use: Never used   Substance and Sexual Activity    Alcohol use: Not Currently    Drug use: Never       No family history on file. PHYSICAL EXAM:  There were no vitals taken for this visit. Physical Exam  Vitals and nursing note reviewed. Constitutional:       General: He is not in acute distress. Appearance: Normal appearance. He is not ill-appearing. Pulmonary:      Effort: Pulmonary effort is normal. No respiratory distress. Abdominal:      General: There is distension. Tenderness: There is no abdominal tenderness. There is no right CVA tenderness or left CVA tenderness. Genitourinary:     Comments: Burning at tip of penis  Neurological:      Mental Status: He is alert and oriented to person, place, and time. Mental status is at baseline. Psychiatric:         Mood and Affect: Mood normal.         Behavior: Behavior normal.       IMAGING:  Impression   1.  Multiple 1-2 mm microliths in both kidneys   2. Cystitis. Multiple vesical calculi. Suprapubic catheter in situ. 3. Subcentimetric cyst in the right lobe of liver. Recommendation: Follow up as clinically indicated. All CT scans at this facility utilize dose modulation, iterative reconstruction, and/or weight based dosing when appropriate to reduce radiation dose to as low as reasonably achievable. Electronically Signed by Mino Galvan at 02-Sep-2022 12:29:59 AM          ASSESSMENT/PLAN  1. Urinary retention  2. Harper catheter problem, subsequent encounter  3. Chronic suprapubic catheter ([address]  4. Bladder neck contracture  Patient with multiple episodes of bladder neck contracture and eventual placement of suprapubic catheter. Recently, patient having several issues with loss of catheter patency. CT imaging was collected to further evaluate and multiple bladder calculi were noted. I discussed the case with Dr. Ascencion Joaquin, and he will move forward with surgical intervention to remove the stones. He will try a transurethral approach. Until that time, we will continue to change the patient's catheter weekly and his neighbor will continue to flush the catheter 2-3 times daily. - Water For Irrigation, Sterile (STERILE WATER FOR IRRIGATION); Irrigate with 120 mLs as directed 2 times daily Irrigate with as directed for 1 dose. Dispense: 7200 mL; Refill: 11  - Culture, Urine    5. Purple urine bag syndrome ([address]  Patient does have peripheral back syndrome today. As we are planning on surgical intervention, we will go ahead and culture his urine and treat with antibiotics if necessary.  - Culture, Urine    At least 75 minutes were spent on the day of the visit reviewing the patient's past medical records/imaging, speaking face to face with the patient, and charting in the post visit period. Orders Placed This Encounter   Procedures    Culture, Urine     Order Specific Question:   Specify (ex-cath, midstream, cysto, etc)? Answer:   new indwelling FC        No follow-ups on file. An electronic signature was used to authenticate this note. ARIELA CHUNG, APRN - CNP    All information inputted into the note by the MA to include chief complaint, past medical history, past surgical history, medications, allergies, social and family history and review of systems has been reviewed and updated as needed by me. EMR Dragon/transcription disclaimer: Much of this document is electronic transcription/translation of spoken language to printed text. The electronic translation of spoken language may be erroneous or, at times, nonsensical words or phrases may be inadvertently transcribed.  Although I have reviewed the document for such errors, some may still exist.

## 2022-09-05 LAB
ORGANISM: ABNORMAL
URINE CULTURE, ROUTINE: ABNORMAL

## 2022-09-06 ENCOUNTER — TELEPHONE (OUTPATIENT)
Dept: UROLOGY | Age: 87
End: 2022-09-06

## 2022-09-06 DIAGNOSIS — T83.9XXD FOLEY CATHETER PROBLEM, SUBSEQUENT ENCOUNTER: Primary | ICD-10-CM

## 2022-09-06 RX ORDER — NITROFURANTOIN 25; 75 MG/1; MG/1
100 CAPSULE ORAL 2 TIMES DAILY
Qty: 28 CAPSULE | Refills: 0 | Status: SHIPPED | OUTPATIENT
Start: 2022-09-06 | End: 2022-09-20

## 2022-09-06 NOTE — TELEPHONE ENCOUNTER
----- Message from BRYAN Boogie CNP sent at 9/6/2022  8:45 AM CDT -----  Please let the patient know the culture did grow bacteria and I have sent in an antibiotic to their pharmacy. Macrobid 2 weeks    Distilled water also ok to use for flushing if sterile water not available.

## 2022-09-06 NOTE — TELEPHONE ENCOUNTER
Confirmed with caregiver Benwood Drain being faxed to pharmacy to treat culture results, confirmed pharmacy. Also informed him can use distilled water when sterile water not available to flush. Confirming with APRN when pt should fu and will call back to confirm with pt or caregiver.

## 2022-09-09 ENCOUNTER — NURSE ONLY (OUTPATIENT)
Dept: UROLOGY | Age: 87
End: 2022-09-09
Payer: MEDICARE

## 2022-09-09 DIAGNOSIS — N32.0 BLADDER NECK CONTRACTURE: Primary | ICD-10-CM

## 2022-09-09 PROCEDURE — 51705 CHANGE OF BLADDER TUBE: CPT | Performed by: NURSE PRACTITIONER

## 2022-09-09 NOTE — PROGRESS NOTES
Chronic Suprapubic Catheter   Patient presents today with a history of a chronic suprapubic catheter for several months. The etiology is felt to be due to: Neurogenic bladder. Overall, the problem is unchanged. Procedure Note (9/9/22): The patient's suprapubic catheter was removed. Using sterile technique patient was cleansed with Hibiclens and sterile water solution, 20 Spanish SP catheter was inserted into the bladder, urine was drained from bladder, 10 mL of sterile water was used to fill up the catheter balloon. Nurse irrigated 500cc to flush the catheter. The catheter was then hooked up to drainage bag. The patient tolerated the procedure well. BRYAN Reyes was in office at time of procedure. Patient will follow up in 1 week for a cath change and will continue to do daily flushes at home.

## 2022-09-15 ENCOUNTER — NURSE ONLY (OUTPATIENT)
Dept: UROLOGY | Age: 87
End: 2022-09-15
Payer: MEDICARE

## 2022-09-15 ENCOUNTER — HOSPITAL ENCOUNTER (OUTPATIENT)
Dept: PREADMISSION TESTING | Age: 87
Discharge: HOME | End: 2022-09-19
Payer: MEDICARE

## 2022-09-15 VITALS — WEIGHT: 123 LBS | BODY MASS INDEX: 19.3 KG/M2 | HEIGHT: 67 IN

## 2022-09-15 DIAGNOSIS — N32.0 BLADDER NECK CONTRACTURE: Primary | ICD-10-CM

## 2022-09-15 LAB
ALBUMIN SERPL-MCNC: 4.1 G/DL (ref 3.5–5.2)
ALP BLD-CCNC: 96 U/L (ref 40–130)
ALT SERPL-CCNC: 13 U/L (ref 5–41)
ANION GAP SERPL CALCULATED.3IONS-SCNC: 11 MMOL/L (ref 7–19)
AST SERPL-CCNC: 19 U/L (ref 5–40)
BASOPHILS ABSOLUTE: 0 K/UL (ref 0–0.2)
BASOPHILS RELATIVE PERCENT: 0.1 % (ref 0–1)
BILIRUB SERPL-MCNC: <0.2 MG/DL (ref 0.2–1.2)
BUN BLDV-MCNC: 18 MG/DL (ref 8–23)
CALCIUM SERPL-MCNC: 9.6 MG/DL (ref 8.8–10.2)
CHLORIDE BLD-SCNC: 100 MMOL/L (ref 98–111)
CO2: 27 MMOL/L (ref 22–29)
CREAT SERPL-MCNC: 0.5 MG/DL (ref 0.5–1.2)
EOSINOPHILS ABSOLUTE: 0.1 K/UL (ref 0–0.6)
EOSINOPHILS RELATIVE PERCENT: 1 % (ref 0–5)
GFR AFRICAN AMERICAN: >59
GFR NON-AFRICAN AMERICAN: >60
GLUCOSE BLD-MCNC: 116 MG/DL (ref 74–109)
HCT VFR BLD CALC: 38.7 % (ref 42–52)
HEMOGLOBIN: 12.3 G/DL (ref 14–18)
IMMATURE GRANULOCYTES #: 0 K/UL
LYMPHOCYTES ABSOLUTE: 1.3 K/UL (ref 1.1–4.5)
LYMPHOCYTES RELATIVE PERCENT: 15.1 % (ref 20–40)
MCH RBC QN AUTO: 29.6 PG (ref 27–31)
MCHC RBC AUTO-ENTMCNC: 31.8 G/DL (ref 33–37)
MCV RBC AUTO: 93 FL (ref 80–94)
MONOCYTES ABSOLUTE: 0.7 K/UL (ref 0–0.9)
MONOCYTES RELATIVE PERCENT: 7.4 % (ref 0–10)
NEUTROPHILS ABSOLUTE: 6.8 K/UL (ref 1.5–7.5)
NEUTROPHILS RELATIVE PERCENT: 76.2 % (ref 50–65)
PDW BLD-RTO: 14 % (ref 11.5–14.5)
PLATELET # BLD: 268 K/UL (ref 130–400)
PMV BLD AUTO: 11.4 FL (ref 9.4–12.4)
POTASSIUM SERPL-SCNC: 4.3 MMOL/L (ref 3.5–5)
RBC # BLD: 4.16 M/UL (ref 4.7–6.1)
SODIUM BLD-SCNC: 138 MMOL/L (ref 136–145)
TOTAL PROTEIN: 6.6 G/DL (ref 6.6–8.7)
WBC # BLD: 8.9 K/UL (ref 4.8–10.8)

## 2022-09-15 PROCEDURE — 51705 CHANGE OF BLADDER TUBE: CPT | Performed by: NURSE PRACTITIONER

## 2022-09-15 PROCEDURE — 80053 COMPREHEN METABOLIC PANEL: CPT

## 2022-09-15 PROCEDURE — 85025 COMPLETE CBC W/AUTO DIFF WBC: CPT

## 2022-09-15 PROCEDURE — 93005 ELECTROCARDIOGRAM TRACING: CPT | Performed by: NURSE PRACTITIONER

## 2022-09-15 RX ORDER — LEVOFLOXACIN 5 MG/ML
500 INJECTION, SOLUTION INTRAVENOUS ONCE
Status: CANCELLED | OUTPATIENT
Start: 2022-09-27

## 2022-09-15 NOTE — DISCHARGE INSTRUCTIONS
The day before your surgery, you will receive a phone call from the surgery nurse, to let you know what time to arrive on the day of surgery. This call will usually be between 2-4 PM.  If you do not receive a phone call by 4 PM the day before your surgery, please call [phone] and let them know you have not received an arrival time. If your surgery is on Monday, your call will be on the Friday before your Monday surgery. MEDICATION INSTRUCTIONS PRIOR TO YOUR SURGERY    Night before surgery:      ________Do not take Metformin (you will not be eating or drinking after midnight)      The morning of surgery: You can take all your usual prescribed medications with a small sip of water. DO NOT TAKE ANY DIABETIC MEDICATIONS the morning of your surgery. Do not take your Metformin the day of surgery. DO NOT TAKE ANY SUPPLEMENTS or over the counter medications the morning of  surgery. The morning of surgery, you may take all your prescribed medications with a sip of water. Any exceptions to this would be listed below:  Asprin and Vitamin E as per your prescribing physician. DO NOT TAKE YOUR LISINOPRIL THE MORNING OF SURGERY. PREOPERATIVE GUIDELINES WHEN RECEIVING ANESTHESIA    Do not eat or drink anything after midnight, the night before your surgery. This is extremely important for your safety. Take a bath (or shower) the night before your surgery and you may brush your teeth the morning of your surgery. You will be scheduled to arrive at the hospital 2 hours before your surgery, or follow your surgeon's instructions. Dress comfortably. Wear loose clothing that will be easy to remove and comfortable for your trip home. You may wear eyeglasses or contacts but bring your cases with you as they must be remove before your surgery. Hearing aids and dentures will need to be removed before your surgery. Do not wear any jewelry, including body jewelry.   All jewelry will need to be removed prior to your surgery. Do not wear fingernail polish or make-up. It is best not to bring any valuables with you. If you are to stay in the hospital overnight, bring your robe, slippers and personal toiletries that you may need. POSTOPERATIVE GUIDELINES AFTER RECEIVING ANESTHESIA    If you are to go home after your surgery, you will need a responsible adult to drive you home. You will not be able to take public transportation after your discharge from the Operative Care Unit unless you are accompanied by a        responsible adult. On returning home, be sure to follow your physician's orders regarding diet, activity and medications. Remember, surgery with general anesthesia or sedation may leave you sleepy, very tired and with a decreased appetite for 12 to 24 hours. If you develop any post-surgical complications or problems, call your surgeon or [facility] Emergency Department ([phone]). [address] [place] [address] for Surgery Patients-Revised 6-    Visitors for surgery patients are essential for the patient's emotional well-being and care       post operatively. 2.   Visitor Expectations and Limitations        VISITORS MUST WEAR MASKS AT ALL TIMES. NO Cloth masks allowed. 3.  One visitor allowed with patients in the preop/postop rooms. 4.  A second visitor may sit in the waiting area. 5.  No children under 13 allowed in the pre-post op areas unless they are the patient. 6.  Two people may be with an underage surgical/procedural patient in preop/postop        room. 7.  If you are admitted to the hospital post operatively, there are NO RESTRICTIONS on       the floor at this time. 8.  If you are admitted to ICU postoperatively, you may have one visitor in the room from        7A-7P. A second visitor may sit in the ICU waiting room.   There can be no overnight

## 2022-09-15 NOTE — PROGRESS NOTES
Chronic Suprapubic Catheter   Patient presents today with a history of a chronic suprapubic catheter for several months. The etiology is felt to be due to: Neurogenic bladder. Overall, the problem is unchanged. Procedure Note (9/15/22): The patient's suprapubic catheter was removed. Using sterile technique patient was cleansed with Hibiclens and sterile water solution, 20 Jordanian SP catheter was inserted into the bladder, urine was drained from bladder, 10 mL of sterile water was used to fill up the catheter balloon. Nurse irrigated 500cc to flush the catheter. The catheter was then hooked up to drainage bag. The patient tolerated the procedure well. BRYAN Maravilla was in office at time of procedure. Patient will follow up in 1 week for a cath change and will continue to do daily flushes at home.

## 2022-09-16 LAB
EKG P AXIS: NORMAL DEGREES
EKG P-R INTERVAL: 266 MS
EKG Q-T INTERVAL: 432 MS
EKG QRS DURATION: 97 MS
EKG QTC CALCULATION (BAZETT): 425 MS
EKG T AXIS: 61 DEGREES

## 2022-09-19 ENCOUNTER — ANESTHESIA EVENT (OUTPATIENT)
Dept: OPERATING ROOM | Age: 87
End: 2022-09-19
Payer: MEDICARE

## 2022-09-19 NOTE — PROGRESS NOTES
EKG sent for review to anesthesia. Dr. Yesika Tomas reviewed and approved to move forward with planned surgery.

## 2022-09-22 ENCOUNTER — NURSE ONLY (OUTPATIENT)
Dept: UROLOGY | Age: 87
End: 2022-09-22
Payer: MEDICARE

## 2022-09-22 DIAGNOSIS — R33.8 BENIGN PROSTATIC HYPERPLASIA WITH URINARY RETENTION: Primary | ICD-10-CM

## 2022-09-22 DIAGNOSIS — N40.1 BENIGN PROSTATIC HYPERPLASIA WITH URINARY RETENTION: Primary | ICD-10-CM

## 2022-09-22 PROCEDURE — 51705 CHANGE OF BLADDER TUBE: CPT | Performed by: NURSE PRACTITIONER

## 2022-09-22 NOTE — PROGRESS NOTES
Chronic Suprapubic Catheter   Patient presents today with a history of a chronic suprapubic catheter for several months. The etiology is felt to be due to: Neurogenic bladder. Overall, the problem is unchanged. Procedure Note (9/22/22): The patient's suprapubic catheter was removed. Using sterile technique patient was cleansed with Hibiclens and sterile water solution, 20 Stateless SP catheter was inserted into the bladder, urine was drained from bladder, 10 mL of sterile water was used to fill up the catheter balloon. Nurse irrigated 500cc to flush the catheter. The catheter was then hooked up to drainage bag. The patient tolerated the procedure well. Patient scheduled for surgery next week on 9/28/22.

## 2022-09-28 ENCOUNTER — ANESTHESIA (OUTPATIENT)
Dept: OPERATING ROOM | Age: 87
End: 2022-09-28
Payer: MEDICARE

## 2022-09-28 ENCOUNTER — APPOINTMENT (OUTPATIENT)
Dept: GENERAL RADIOLOGY | Age: 87
End: 2022-09-28
Attending: UROLOGY
Payer: MEDICARE

## 2022-09-28 ENCOUNTER — HOSPITAL ENCOUNTER (OUTPATIENT)
Age: 87
Setting detail: OUTPATIENT SURGERY
Discharge: HOME | End: 2022-09-28
Attending: UROLOGY | Admitting: UROLOGY
Payer: MEDICARE

## 2022-09-28 VITALS
HEART RATE: 60 BPM | WEIGHT: 123 LBS | TEMPERATURE: 97.4 F | BODY MASS INDEX: 19.3 KG/M2 | OXYGEN SATURATION: 98 % | SYSTOLIC BLOOD PRESSURE: 135 MMHG | DIASTOLIC BLOOD PRESSURE: 69 MMHG | RESPIRATION RATE: 16 BRPM | HEIGHT: 67 IN

## 2022-09-28 DIAGNOSIS — Z87.442 HISTORY OF KIDNEY STONES: ICD-10-CM

## 2022-09-28 DIAGNOSIS — N32.0 BLADDER NECK CONTRACTURE: Primary | ICD-10-CM

## 2022-09-28 DIAGNOSIS — Z93.59 CHRONIC SUPRAPUBIC CATHETER (HCC): ICD-10-CM

## 2022-09-28 DIAGNOSIS — N21.0 BLADDER STONE: ICD-10-CM

## 2022-09-28 PROBLEM — Z43.5 ENCOUNTER FOR CARE OR REPLACEMENT OF SUPRAPUBIC TUBE (HCC): Status: ACTIVE | Noted: 2022-09-28

## 2022-09-28 PROBLEM — T83.090S: Status: ACTIVE | Noted: 2022-09-28

## 2022-09-28 LAB
GLUCOSE BLD-MCNC: 103 MG/DL (ref 70–99)
PERFORMED ON: ABNORMAL

## 2022-09-28 PROCEDURE — 52640 RELIEVE BLADDER CONTRACTURE: CPT | Performed by: UROLOGY

## 2022-09-28 PROCEDURE — 3600000014 HC SURGERY LEVEL 4 ADDTL 15MIN: Performed by: UROLOGY

## 2022-09-28 PROCEDURE — 6360000002 HC RX W HCPCS: Performed by: NURSE PRACTITIONER

## 2022-09-28 PROCEDURE — 3600000004 HC SURGERY LEVEL 4 BASE: Performed by: UROLOGY

## 2022-09-28 PROCEDURE — C1894 INTRO/SHEATH, NON-LASER: HCPCS | Performed by: UROLOGY

## 2022-09-28 PROCEDURE — 6360000002 HC RX W HCPCS: Performed by: ANESTHESIOLOGY

## 2022-09-28 PROCEDURE — C1758 CATHETER, URETERAL: HCPCS | Performed by: UROLOGY

## 2022-09-28 PROCEDURE — 3700000000 HC ANESTHESIA ATTENDED CARE: Performed by: UROLOGY

## 2022-09-28 PROCEDURE — 82360 CALCULUS ASSAY QUANT: CPT

## 2022-09-28 PROCEDURE — 2709999900 HC NON-CHARGEABLE SUPPLY: Performed by: UROLOGY

## 2022-09-28 PROCEDURE — 88300 SURGICAL PATH GROSS: CPT

## 2022-09-28 PROCEDURE — 51705 CHANGE OF BLADDER TUBE: CPT | Performed by: UROLOGY

## 2022-09-28 PROCEDURE — 7100000010 HC PHASE II RECOVERY - FIRST 15 MIN: Performed by: UROLOGY

## 2022-09-28 PROCEDURE — C1769 GUIDE WIRE: HCPCS | Performed by: UROLOGY

## 2022-09-28 PROCEDURE — 3209999900 FLUORO FOR SURGICAL PROCEDURES

## 2022-09-28 PROCEDURE — 7100000000 HC PACU RECOVERY - FIRST 15 MIN: Performed by: UROLOGY

## 2022-09-28 PROCEDURE — 2720000010 HC SURG SUPPLY STERILE: Performed by: UROLOGY

## 2022-09-28 PROCEDURE — 6360000002 HC RX W HCPCS

## 2022-09-28 PROCEDURE — 2500000003 HC RX 250 WO HCPCS

## 2022-09-28 PROCEDURE — 2580000003 HC RX 258

## 2022-09-28 PROCEDURE — 2500000003 HC RX 250 WO HCPCS: Performed by: ANESTHESIOLOGY

## 2022-09-28 PROCEDURE — 7100000001 HC PACU RECOVERY - ADDTL 15 MIN: Performed by: UROLOGY

## 2022-09-28 PROCEDURE — 6370000000 HC RX 637 (ALT 250 FOR IP): Performed by: UROLOGY

## 2022-09-28 PROCEDURE — 82947 ASSAY GLUCOSE BLOOD QUANT: CPT

## 2022-09-28 PROCEDURE — 7100000011 HC PHASE II RECOVERY - ADDTL 15 MIN: Performed by: UROLOGY

## 2022-09-28 PROCEDURE — 3700000001 HC ADD 15 MINUTES (ANESTHESIA): Performed by: UROLOGY

## 2022-09-28 PROCEDURE — 2580000003 HC RX 258: Performed by: ANESTHESIOLOGY

## 2022-09-28 PROCEDURE — 52318 REMOVE BLADDER STONE: CPT | Performed by: UROLOGY

## 2022-09-28 RX ORDER — HYDROMORPHONE HYDROCHLORIDE 1 MG/ML
0.5 INJECTION, SOLUTION INTRAMUSCULAR; INTRAVENOUS; SUBCUTANEOUS EVERY 5 MIN PRN
Status: DISCONTINUED | OUTPATIENT
Start: 2022-09-28 | End: 2022-09-28 | Stop reason: HOSPADM

## 2022-09-28 RX ORDER — DIPHENHYDRAMINE HYDROCHLORIDE 50 MG/ML
12.5 INJECTION INTRAMUSCULAR; INTRAVENOUS
Status: CANCELLED | OUTPATIENT
Start: 2022-09-28 | End: 2022-09-29

## 2022-09-28 RX ORDER — HYDROCODONE BITARTRATE AND ACETAMINOPHEN 5; 325 MG/1; MG/1
1 TABLET ORAL EVERY 6 HOURS PRN
Qty: 12 TABLET | Refills: 0 | Status: SHIPPED | OUTPATIENT
Start: 2022-09-28 | End: 2022-10-01

## 2022-09-28 RX ORDER — LEVOFLOXACIN 5 MG/ML
500 INJECTION, SOLUTION INTRAVENOUS ONCE
Status: COMPLETED | OUTPATIENT
Start: 2022-09-28 | End: 2022-09-28

## 2022-09-28 RX ORDER — ONDANSETRON 2 MG/ML
4 INJECTION INTRAMUSCULAR; INTRAVENOUS
Status: DISCONTINUED | OUTPATIENT
Start: 2022-09-28 | End: 2022-09-28 | Stop reason: HOSPADM

## 2022-09-28 RX ORDER — SODIUM CHLORIDE 0.9 % (FLUSH) 0.9 %
5-40 SYRINGE (ML) INJECTION PRN
Status: CANCELLED | OUTPATIENT
Start: 2022-09-28

## 2022-09-28 RX ORDER — SODIUM CHLORIDE, SODIUM LACTATE, POTASSIUM CHLORIDE, CALCIUM CHLORIDE 600; 310; 30; 20 MG/100ML; MG/100ML; MG/100ML; MG/100ML
INJECTION, SOLUTION INTRAVENOUS CONTINUOUS PRN
Status: DISCONTINUED | OUTPATIENT
Start: 2022-09-28 | End: 2022-09-28 | Stop reason: SDUPTHER

## 2022-09-28 RX ORDER — ONDANSETRON 2 MG/ML
4 INJECTION INTRAMUSCULAR; INTRAVENOUS EVERY 4 HOURS PRN
Status: DISCONTINUED | OUTPATIENT
Start: 2022-09-28 | End: 2022-09-28 | Stop reason: HOSPADM

## 2022-09-28 RX ORDER — SULFAMETHOXAZOLE AND TRIMETHOPRIM 800; 160 MG/1; MG/1
1 TABLET ORAL 2 TIMES DAILY
Qty: 14 TABLET | Refills: 0 | Status: SHIPPED | OUTPATIENT
Start: 2022-09-28 | End: 2022-10-05

## 2022-09-28 RX ORDER — HYDROMORPHONE HYDROCHLORIDE 1 MG/ML
0.25 INJECTION, SOLUTION INTRAMUSCULAR; INTRAVENOUS; SUBCUTANEOUS EVERY 5 MIN PRN
Status: DISCONTINUED | OUTPATIENT
Start: 2022-09-28 | End: 2022-09-28 | Stop reason: HOSPADM

## 2022-09-28 RX ORDER — LIDOCAINE HYDROCHLORIDE 10 MG/ML
INJECTION, SOLUTION EPIDURAL; INFILTRATION; INTRACAUDAL; PERINEURAL PRN
Status: DISCONTINUED | OUTPATIENT
Start: 2022-09-28 | End: 2022-09-28 | Stop reason: SDUPTHER

## 2022-09-28 RX ORDER — PROPOFOL 10 MG/ML
INJECTION, EMULSION INTRAVENOUS PRN
Status: DISCONTINUED | OUTPATIENT
Start: 2022-09-28 | End: 2022-09-28 | Stop reason: SDUPTHER

## 2022-09-28 RX ORDER — SODIUM CHLORIDE 9 MG/ML
INJECTION, SOLUTION INTRAVENOUS PRN
Status: CANCELLED | OUTPATIENT
Start: 2022-09-28

## 2022-09-28 RX ORDER — HYDROMORPHONE HYDROCHLORIDE 1 MG/ML
0.25 INJECTION, SOLUTION INTRAMUSCULAR; INTRAVENOUS; SUBCUTANEOUS EVERY 5 MIN PRN
Status: CANCELLED | OUTPATIENT
Start: 2022-09-28

## 2022-09-28 RX ORDER — SODIUM CHLORIDE 0.9 % (FLUSH) 0.9 %
5-40 SYRINGE (ML) INJECTION EVERY 12 HOURS SCHEDULED
Status: CANCELLED | OUTPATIENT
Start: 2022-09-28

## 2022-09-28 RX ORDER — ROCURONIUM BROMIDE 10 MG/ML
INJECTION, SOLUTION INTRAVENOUS PRN
Status: DISCONTINUED | OUTPATIENT
Start: 2022-09-28 | End: 2022-09-28 | Stop reason: SDUPTHER

## 2022-09-28 RX ORDER — SODIUM CHLORIDE 9 MG/ML
INJECTION, SOLUTION INTRAVENOUS CONTINUOUS
Status: DISCONTINUED | OUTPATIENT
Start: 2022-09-28 | End: 2022-09-28 | Stop reason: HOSPADM

## 2022-09-28 RX ORDER — OXYCODONE HYDROCHLORIDE AND ACETAMINOPHEN 5; 325 MG/1; MG/1
2 TABLET ORAL EVERY 4 HOURS PRN
Status: DISCONTINUED | OUTPATIENT
Start: 2022-09-28 | End: 2022-09-28 | Stop reason: HOSPADM

## 2022-09-28 RX ORDER — ONDANSETRON 2 MG/ML
INJECTION INTRAMUSCULAR; INTRAVENOUS PRN
Status: DISCONTINUED | OUTPATIENT
Start: 2022-09-28 | End: 2022-09-28 | Stop reason: SDUPTHER

## 2022-09-28 RX ORDER — FENTANYL CITRATE 50 UG/ML
INJECTION, SOLUTION INTRAMUSCULAR; INTRAVENOUS PRN
Status: DISCONTINUED | OUTPATIENT
Start: 2022-09-28 | End: 2022-09-28 | Stop reason: SDUPTHER

## 2022-09-28 RX ADMIN — SODIUM CHLORIDE, SODIUM LACTATE, POTASSIUM CHLORIDE, AND CALCIUM CHLORIDE: 600; 310; 30; 20 INJECTION, SOLUTION INTRAVENOUS at 09:25

## 2022-09-28 RX ADMIN — SODIUM CHLORIDE, SODIUM LACTATE, POTASSIUM CHLORIDE, AND CALCIUM CHLORIDE: 600; 310; 30; 20 INJECTION, SOLUTION INTRAVENOUS at 11:20

## 2022-09-28 RX ADMIN — SUGAMMADEX 200 MG: 100 INJECTION, SOLUTION INTRAVENOUS at 11:19

## 2022-09-28 RX ADMIN — PROPOFOL 20 MG: 10 INJECTION, EMULSION INTRAVENOUS at 10:47

## 2022-09-28 RX ADMIN — FENTANYL CITRATE 50 MCG: 50 INJECTION, SOLUTION INTRAMUSCULAR; INTRAVENOUS at 09:53

## 2022-09-28 RX ADMIN — PROPOFOL 120 MG: 10 INJECTION, EMULSION INTRAVENOUS at 09:56

## 2022-09-28 RX ADMIN — LIDOCAINE HYDROCHLORIDE 50 MG: 10 INJECTION, SOLUTION EPIDURAL; INFILTRATION; INTRACAUDAL; PERINEURAL at 09:56

## 2022-09-28 RX ADMIN — ONDANSETRON 4 MG: 2 INJECTION INTRAMUSCULAR; INTRAVENOUS at 11:19

## 2022-09-28 RX ADMIN — FENTANYL CITRATE 25 MCG: 50 INJECTION, SOLUTION INTRAMUSCULAR; INTRAVENOUS at 10:46

## 2022-09-28 RX ADMIN — SODIUM CHLORIDE, PRESERVATIVE FREE 20 MG: 5 INJECTION INTRAVENOUS at 08:42

## 2022-09-28 RX ADMIN — ROCURONIUM BROMIDE 30 MG: 10 INJECTION, SOLUTION INTRAVENOUS at 09:56

## 2022-09-28 RX ADMIN — LEVOFLOXACIN 500 MG: 5 INJECTION, SOLUTION INTRAVENOUS at 10:05

## 2022-09-28 RX ADMIN — OXYCODONE HYDROCHLORIDE AND ACETAMINOPHEN 2 TABLET: 5; 325 TABLET ORAL at 13:01

## 2022-09-28 RX ADMIN — FENTANYL CITRATE 25 MCG: 50 INJECTION, SOLUTION INTRAMUSCULAR; INTRAVENOUS at 10:27

## 2022-09-28 RX ADMIN — HYDROMORPHONE HYDROCHLORIDE 0.5 MG: 1 INJECTION, SOLUTION INTRAMUSCULAR; INTRAVENOUS; SUBCUTANEOUS at 12:05

## 2022-09-28 RX ADMIN — HYDROMORPHONE HYDROCHLORIDE 0.5 MG: 1 INJECTION, SOLUTION INTRAMUSCULAR; INTRAVENOUS; SUBCUTANEOUS at 11:51

## 2022-09-28 ASSESSMENT — PAIN SCALES - GENERAL
PAINLEVEL_OUTOF10: 8
PAINLEVEL_OUTOF10: 7
PAINLEVEL_OUTOF10: 6
PAINLEVEL_OUTOF10: 4
PAINLEVEL_OUTOF10: 3

## 2022-09-28 ASSESSMENT — PAIN DESCRIPTION - ONSET
ONSET: ON-GOING
ONSET: ON-GOING

## 2022-09-28 ASSESSMENT — PAIN DESCRIPTION - DESCRIPTORS
DESCRIPTORS: ACHING
DESCRIPTORS: BURNING
DESCRIPTORS: ACHING

## 2022-09-28 ASSESSMENT — LIFESTYLE VARIABLES: SMOKING_STATUS: 0

## 2022-09-28 ASSESSMENT — PAIN DESCRIPTION - FREQUENCY
FREQUENCY: CONTINUOUS

## 2022-09-28 ASSESSMENT — PAIN DESCRIPTION - PAIN TYPE
TYPE: SURGICAL PAIN

## 2022-09-28 ASSESSMENT — PAIN - FUNCTIONAL ASSESSMENT: PAIN_FUNCTIONAL_ASSESSMENT: 0-10

## 2022-09-28 ASSESSMENT — PAIN DESCRIPTION - LOCATION
LOCATION: PENIS
LOCATION: PENIS

## 2022-09-28 NOTE — OP NOTE
Brief operative Note      Patient: Berta Whitman  YOB: 1934  MRN: [id number]    Date of Procedure: 9/28/2022    Pre-Op Diagnosis: Bladder stone [N21.0]  Chronic suprapubic catheter [facility]) [Z93.59]  Bladder neck contracture  Block suprapubic catheter  Chronic urinary retention    Post-Op Diagnosis: Same       Procedure(s):  1. CYSTOSCOPY; SUPRAPUBIC CATHETER EXCHANGE;   2.  TRANSURTERTHERAL INCISION OF BLADDER NECK contracture;   3. Cystoscopy, cystolitholopaxy with laser ablation and removal of bladder calculi greater than 2.5 cm,  complex      Surgeon(s):  Moises Garcia MD    Assistant:   * No surgical staff found *    Anesthesia: General    Estimated Blood Loss (mL): 0    Complications: None    Specimens:   ID Type Source Tests Collected by Time Destination   A : bladder calculi Stone (Calculus) Bladder SURGICAL PATHOLOGY, STONE ANALYSIS Moises Garcia MD 9/28/2022 1117        Implants:  * No implants in log *      Drains:   Urinary Catheter 09/28/22 Harper (Active)       Suprapubic Catheter (Active)       [REMOVED] Urinary Catheter 09/16/21 Double-lumen (Removed)       [REMOVED] Urinary Catheter 11/16/21 Triple-lumen (Removed)       [REMOVED] Suprapubic Catheter (Removed)       Findings: Completely occluded bladder neck contracture as seen previously was able to place a guidewire across this and perform incision of bladder neck contracture. Suprapubic tube was changed to 22 French 10 cc balloon. Multiple bladder stones with several stones being greater than 1 cm with a total stone burden greater than 2.5 cm. These were laser ablated with small Fiduciary washed out and evacuated with Elich. At the end the procedure no stones were seen. A 20 French 10 cc urethral Harper was left in place across bladder neck. This catheter can be removed in 1 week. .  Otherwise suprapubic catheter removed every 3 to 4 weeks and as needed this can be hand irrigated with sterile water or sterile saline to reduce encrustation and debris. Detailed Description of Procedure:   See dictated report: [id number]    Disposition to PACU then to op care outpatient. He will be dismissed with both urethral catheter and suprapubic catheter. He will return in 1 week for removal of the urethral catheter. He will need the suprapubic catheter change every 3 to 4 weeks as needed. His caregiver was instructed to hand irrigate with sterile water or distilled water, or sterile saline 2-3 times a day to prevent encrustation. We will empirically cover him with Bactrim DS antibiotic for 7 days after the procedure.   Complaining of some bladder spasms we will add Myrbetriq 25 mg    Electronically signed by Anuj Miranda MD on 9/28/2022 at 11:53 AM

## 2022-09-28 NOTE — ANESTHESIA PRE PROCEDURE
Department of Anesthesiology  Preprocedure Note       Name:  Penny Black   Age:  80 y.o.  :  1934                                          MRN:  [id number]         Date:  2022      Surgeon: Radha Malik):  Mayuri Riley MD    Procedure: Procedure(s):  CYSTOSCOPY TRANSURETHRAL INCISION/RESECTION BLADDER NECK  REMOVAL OF BLADDER CALCULI  SUPRAPUBIC CATHETER EXCHANGE    Medications prior to admission:   Prior to Admission medications    Medication Sig Start Date End Date Taking? Authorizing Provider   Water For Irrigation, Sterile (STERILE WATER FOR IRRIGATION) Irrigate with 120 mLs as directed 2 times daily Irrigate with as directed for 1 dose. 22  Marisa Viramontes, APRN - CNP   aspirin 81 MG EC tablet Take 81 mg by mouth daily    Historical Provider, MD   lisinopril (PRINIVIL;ZESTRIL) 5 MG tablet Take 5 mg by mouth daily    Historical Provider, MD   memantine (NAMENDA) 10 MG tablet Take 10 mg by mouth 2 times daily    Historical Provider, MD   vitamin E 400 UNIT capsule Take 400 Units by mouth daily    Historical Provider, MD   Cranberry 500 MG TABS Take 1 tablet by mouth daily    Historical Provider, MD   Multiple Vitamins-Minerals (MULTIVITAMIN MEN 50+) TABS Take 1 tablet by mouth daily    Historical Provider, MD   omeprazole (PRILOSEC) 20 MG delayed release capsule Take 20 mg by mouth daily     Historical Provider, MD   mirtazapine (REMERON) 30 MG tablet Take 15 mg by mouth nightly     Historical Provider, MD   atorvastatin (LIPITOR) 20 MG tablet Take 20 mg by mouth daily Take 1/2 tab at bedtime.      Historical Provider, MD   verapamil (CALAN) 120 MG tablet Take 120 mg by mouth 2 times daily     Historical Provider, MD   donepezil (ARICEPT) 10 MG tablet Take 10 mg by mouth nightly     Historical Provider, MD   melatonin 3 MG TABS tablet Take 3 mg by mouth nightly as needed Take 2 hs prn     Historical Provider, MD   metFORMIN (GLUCOPHAGE) 1000 MG tablet Take 500 mg by mouth 2 times daily (with meals)     Historical Provider, MD       Current medications:    Current Facility-Administered Medications   Medication Dose Route Frequency Provider Last Rate Last Admin    levoFLOXacin (LEVAQUIN) 500 MG/100ML infusion 500 mg  500 mg IntraVENous Once BRYAN Oquendo - CNP           Allergies:     Allergies   Allergen Reactions    Pcn [Penicillins] Hives     \"Huge dose of PCN\"       Problem List:    Patient Active Problem List   Diagnosis Code    Benign prostatic hyperplasia with urinary retention N40.1, R33.8    Bladder neck contracture N32.0    Urinary retention R33.9    Hypotension I95.9    Palliative care patient Z51.5       Past Medical History:        Diagnosis Date    Arthritis     Bladder neck contracture     Diabetes mellitus [address] [address]     History of blood transfusion     Hyperlipidemia     Hypertension     Immunization due     Neuropathy     Palliative care patient 2021       Past Surgical History:        Procedure Laterality Date    COLONOSCOPY      CYSTOSCOPY N/A 2021    SUPRAPUBIC TUBE PLACEMENT performed by Pham Real MD at \[facility]\"" N/A 2021    CYSTOSCOPY TRANSURETHRAL inison BLADDER Bladder neck contracture ; exchange supra pubic catheter performed by Pham Real MD at \[facility]\"" N/A 2021    CYSTOSCOPY TRANSURETHRAL EXCISION BLADDER NECK, SUPRAPUBIC CATHETER EXCHANGE (16f) performed by Pham Real MD at [address] [place] [address], DIAGNOSTIC      EYE SURGERY Bilateral     cataract    NOSE SURGERY      SKIN BIOPSY      TONSILLECTOMY      TURP N/A 2021    TRANSURETHRAL RESECTION PROSTATE performed by Pham Real MD at [facility] OR       Social History:    Social History     Tobacco Use    Smoking status: Former     Types: Cigars     Quit date: 2000     Years since quittin.7    Smokeless tobacco: Never   Substance Use Topics    Alcohol use: Not Currently found for: PREGTESTUR, PREGSERUM, HCG, HCGQUANT     ABGs: No results found for: PHART, PO2ART, OQB9KOW, OTO4HHU, BEART, F7ETBACE     Type & Screen (If Applicable):  No results found for: LABABO, LABRH    Drug/Infectious Status (If Applicable):  No results found for: HIV, HEPCAB    COVID-19 Screening (If Applicable):   Lab Results   Component Value Date/Time    COVID19 Not Detected 11/15/2021 01:34 PM    COVID19 Not Detected 09/15/2021 11:50 AM           Anesthesia Evaluation  Patient summary reviewed no history of anesthetic complications:   Airway: Mallampati: I  TM distance: >3 FB   Neck ROM: full  Mouth opening: > = 3 FB   Dental:          Pulmonary:normal exam  breath sounds clear to auscultation      (-) asthma, recent URI, sleep apnea and not a current smoker          Patient did not smoke on day of surgery. Cardiovascular:  Exercise tolerance: good (>4 METS),   (+) hypertension:, dysrhythmias (Questionable A-fib on EKG, but asymptomatic):,     (-) pacemaker, past MI, CABG/stent and  angina    ECG reviewed  Rhythm: regular  Rate: normal           Beta Blocker:  Not on Beta Blocker         Neuro/Psych:      (-) seizures, TIA and CVA           GI/Hepatic/Renal:        (-) GERD, liver disease and no renal disease       Endo/Other:    (+) DiabetesType II DM, , .    (-) hypothyroidism, hyperthyroidism               Abdominal:             Vascular:     - DVT. Other Findings:           Anesthesia Plan      general     ASA 3     (Preop famotidine)  Induction: intravenous. MIPS: Postoperative opioids intended and Prophylactic antiemetics administered. Anesthetic plan and risks discussed with patient and legal guardian. Use of blood products discussed with patient and legal guardian whom consented to blood products.                      Phyllis Lea MD   9/28/2022

## 2022-09-28 NOTE — DISCHARGE INSTRUCTIONS
[facility], Dr Chapin Torres    Cystoscopy / Ureteroscopy / Bladder Endoscopy Procedures Discharge Instructions      You may experience : Burning sensation when you void     A feeling of a need to go to the bathroom frequently     You may have urgent urination     Your urine may be blood tinged    These symptoms should be relieved within a few hours and days  as you increase the amounts of fluids you drink and the number of times that you empty your bladder. They may persist for 1-2 weeks if you have a stent or had a biopsy or resection. We recommended that you drink plenty of fluid a few days after surgery. If you have a ureteral stent he may have pain in your side or back related to the stent. Stents also cause bladder irritation symptoms of frequency and urgency. No strenuous activities for 1 week or  until you talk to your doctor. You may feel light headed up to 24 hours after anesthesia. You should not do the following for the next 24 hours. Drive a car, operate machinery or power tools     Drink any alcoholic drinks (not even beer or wine)     Make any important decisions, i.e., signing important papers. It is recommended that you begin with clear liquids and/or light foods. If you  Are not nauseated, progress to your normal diet. I you are unable to urinate you should call your surgeon.     And irrigate the suprapubic catheter 3 times a day with sterile water if not available you can use distilled water or sterile saline    Dr. Bulmaro Blanton

## 2022-09-28 NOTE — INTERVAL H&P NOTE
Update History & Physical    The patient's History and Physical of September 2, 2022 was reviewed with the patient and I examined the patient. There was no change. The surgical site was confirmed by the patient and me. Plan: The risks, benefits, expected outcome, and alternative to the recommended procedure have been discussed with the patient. Patient understands and wants to proceed with the procedure.      Electronically signed by Jazz Spencer MD on 9/28/2022 at 9:38 AM

## 2022-09-28 NOTE — PROGRESS NOTES
Dr. Ric Crisostomo here to speak with pt and caregiver. Caregiver questioning wether pt should go home or stay over night due to his c/o pain and tremors (appears to be shaking as if he is cold). Dr. Ric Crisostomo also informed that [employer] called and stated that pt did not have insurance and the Myrbetriq medication was going to cost pt $430 out of pocket. Dr. Ric Crisostomo stated he would cancel the prescription and have the office bring the pt some samples. Dr. Ric Crisostomo also stated to watch the pt another hour before discharging the pt home.

## 2022-09-28 NOTE — PROGRESS NOTES
CLINICAL PHARMACY NOTE: MEDS TO BEDS    Total # of Prescriptions Filled: 1   The following medications were delivered to the patient:  Hydrocodone-APAP 5-325     Additional Documentation:   Delivered Rx to [facility]. Gave Rx to patients caretaker Mercedez Rodrigues. Mercedez Rodrigues paid $13.98 copays with credit card.

## 2022-09-28 NOTE — ANESTHESIA POSTPROCEDURE EVALUATION
Department of Anesthesiology  Postprocedure Note    Patient: Usman Bergeron  MRN: [id number]  YOB: 1934  Date of evaluation: 9/28/2022      Procedure Summary     Date: 09/28/22 Room / Location: [facility]    Anesthesia Start: 6465 Anesthesia Stop: 1134    Procedure: CYSTOSCOPY; SUPRAPUBIC CATHETER EXCHANGE; TRANSURTERTHERAL RESECTION OF BLADDER NECK; REMOVAL BLADDER CALCULI COMPLEX; LASER ABLATION Diagnosis:       Bladder stone      Chronic suprapubic catheter ([address]      (Bladder stone [N21.0])      (Chronic suprapubic catheter ([address] [Z93.59])    Surgeons: Kindra oRdriguez MD Responsible Provider: BRYAN Huizar CRNA    Anesthesia Type: general ASA Status: 3          Anesthesia Type: No value filed.     Sheri Phase I: Sheri Score: 10    Sheri Phase II:        Anesthesia Post Evaluation    Patient location during evaluation: PACU  Patient participation: complete - patient participated  Level of consciousness: sleepy but conscious  Pain score: 0  Airway patency: patent  Nausea & Vomiting: no vomiting and no nausea  Complications: no  Cardiovascular status: hemodynamically stable  Respiratory status: acceptable and room air  Hydration status: stable

## 2022-09-29 NOTE — OP NOTE
[facility] [place] [address] [facility]                                OPERATIVE REPORT    PATIENT NAME: Dean Brown                      :        1934  MED REC NO:   [id number]                              ROOM:  ACCOUNT NO:   [de-identified]                           ADMIT DATE: 2022  PROVIDER:     Trina Dueñas MD    DATE OF PROCEDURE:  2022    TITLE OF OPERATION:  1. Cystoscopy, change of suprapubic catheter with upsize to 22-Lao. 2.  Transurethral incision of bladder neck contracture. 3.  Cystoscopy with cystolitholapaxy/laser ablation and removal of  bladder calculi, complex, greater than 2.5 cm. PREOPERATIVE DIAGNOSES:  1. Chronic suprapubic catheter. 2.  Recurrent blockage of suprapubic catheter. 3.  Bladder calculi. 4.  Bladder neck contracture. 5.  Chronic urinary retention. POSTOPERATIVE DIAGNOSES:  1. Chronic suprapubic catheter. 2.  Recurrent blockage of suprapubic catheter. 3.  Bladder calculi. 4.  Bladder neck contracture. 5.  Chronic urinary retention. ANESTHETIC:  General anesthetic. ATTENDING SURGEON:  Trina Dueñas MD    ESTIMATED BLOOD LOSS:  0 mL. HISTORY:  The patient is an 80-year-old gentleman with a complicated  history. He underwent a TURP for urinary retention. Postoperatively,  he was never really able to void, and therefore, he developed a bladder  neck contracture. This has been recurrent despite transurethral  resection and incision of bladder neck contracture on multiple times. Because of this and his continued chronic urinary retention and  recurrent bladder neck contracture, a suprapubic tube was placed.   Since  the placement of suprapubic tube and most recently, he has been having  trouble with blockage from encrustation of his suprapubic tube, this has  improved since we upsized it from a 16-Lao to 20-Lao, his  caregiver is irrigating the suprapubic catheter couple of times a day  and this seems to help as well, but he still has to have this changed at  least every 1 to 2 weeks because of the blockage. He, therefore,  underwent a CT scan which showed multiple stones in the patient's  bladder and with the stone burden being greater than 2.5 cm. Therefore,  it was felt that he need to go to the operating room for removal of  these bladder stones, upsize and change of his suprapubic catheter. Because of his bladder neck contracture, in order to treat the stones,  he will have to have a transurethral incision of bladder neck  contracture. This was discussed with the patient and his caregiver  including the risk of infection, incontinence, bladder perforation,  recurrent contracture, need for continued suprapubic tube, recurrent  stones with encrustation. They understand and are willing to proceed. DESCRIPTION OF PROCEDURE:  The patient was brought to the operating  room, underwent general anesthetic. He was placed in the lithotomy  position. His genitalia was prepped and draped per routine sterile  fashion. The 22-Italian cystoscope was inserted into the meatus and this  was advanced under direct vision. Penile and bulbar urethra appeared to  be normal.  Entering the prostatic urethra, his prostatic fossa was  actually wide open, but the bladder neck was completely occluded. I  could see a little divot where the previous opening was in place. I  tried to place a 5-Italian catheter across this, but it was stiff enough  to go through. Therefore, I removed his suprapubic catheter and looked  in with a flexible scope and I could see the divot on the bladder side.    I tried to place a catheter or guidewire through this side but just  because of the angulation with the flexible scope, I could not do so, so  I looked back in with a rigid scope through the penis and was able to  advance my way to the bladder neck contracture and then I used the stiff  end of the Super Stiff guidewire and punctured through this scar. It  was essentially occluded and punctured through into the bladder. This  was confirmed to be in the bladder under fluoroscopy and then I looked  back through the suprapubic tube tract with the scope, it was confirmed  to be into the patient's bladder. Therefore, then I switched the  resectoscope and used the Hoskins knife to incise along the guidewire at  the 9 o'clock position until I had opened this up to where I was within  the lumen of the bladder. I then incised at the 3 and 9 o'clock  position, the bladder neck contracture and tried to incise this wide  open. I also incised slightly at 6 o'clock, though was cautious to do  it posteriorly because I did not want to incise into the rectum. Anteriorly, the bladder neck was incised at 12 o'clock until this was  open wide enough so that I could easily get the 26-Ghanaian continuous  flow resectoscope sheath within the patient's bladder. I did have to  dilate the meatus with Xavier max to get the 26-Ghanaian sheath in  his meatus, but this was easily dilated and then easily placed after  dilation. Once I got into the patient's bladder, as mentioned, the  20-Ghanaian suprapubic catheter had been removed and this was replaced  with a 22-Ghanaian suprapubic catheter with 10 mL in the balloon. This  appeared to be in good position. This hand-irrigated good. I then  plugged this with a hemostat. Multiple stones were seen within the  lumen of the bladder, several of these stones were greater than 1 cm, so  the stone burden probably was about 3.5 to 4 cm in total.  So,  therefore, the stones were too big, they just washed out through the  scope, so I placed a laser bridge and using the 365 micron holmium laser  fiber, I then fragmented the stones into multiple small enough fragments  that I could washout through the cystoscope sheath with the Bebeto.   Once  I got all the fragments broke up and washed out, I looked to make sure  there were no other residual fragments and there were none. At this  point, the procedure was concluded. Like I said, the bladder neck was  widely open, but this has scarred back down every time I have incised or  resected it, so I elected to go ahead and placed a 20-French catheter  across the urethra and into the patient's bladder. The suprapubic tube  was unplugged and placed to gravity drainage. I withdrew the scope and  then a 20-French 2-way catheter was inserted into the patient's bladder,  10 mL was placed in the balloon and this will be placed to gravity  drainage. He was sent to the recovery room in stable condition. Since he is  complaining of some pain in his penis with bladder spasm and the urge to  urinate, we will go ahead and start him on some Myrbetriq. In addition,  I will cover him in the perioperative period with one week of Bactrim  DS. He will need his suprapubic catheter change regularly, at least  every 3 to 4 weeks or more frequently as needed. He is instructed and  his caregiver is instructed to irrigate the catheter to reduce  encrustation and blockage at least 3 times a day with sterile water if  available; if not available, they can irrigate with distilled water or  sterile saline. The patient will need to follow up in approximately 1  week for removal of the urethral catheter.         Zenon Montano MD    D: 09/28/2022 13:12:59      T: 09/28/2022 15:54:24     [id number]  Job#: [id number]     Doc#: [id number]    CC:

## 2022-10-03 ENCOUNTER — TELEPHONE (OUTPATIENT)
Dept: UROLOGY | Age: 87
End: 2022-10-03

## 2022-10-03 NOTE — TELEPHONE ENCOUNTER
Henry Phan called stating patient needed to be seen 1 week from his surgery to have insert removed, and states he hasn't heard anything. Please call him back at [phone].      Thank you,

## 2022-10-04 LAB
CALCULI COMPOSITION: NORMAL
MASS: 936 MG
STONE DESCRIPTION: NORMAL

## 2022-10-05 ENCOUNTER — NURSE ONLY (OUTPATIENT)
Dept: UROLOGY | Age: 87
End: 2022-10-05
Payer: MEDICARE

## 2022-10-05 DIAGNOSIS — R33.8 BENIGN PROSTATIC HYPERPLASIA WITH URINARY RETENTION: Primary | ICD-10-CM

## 2022-10-05 DIAGNOSIS — N40.1 BENIGN PROSTATIC HYPERPLASIA WITH URINARY RETENTION: Primary | ICD-10-CM

## 2022-10-05 PROCEDURE — 99211 OFF/OP EST MAY X REQ PHY/QHP: CPT | Performed by: NURSE PRACTITIONER

## 2022-10-05 NOTE — PROGRESS NOTES
Patient of Dr Omayra Riley presents today for cath removal post Transurethral incision of bladder neck contracture. Cystoscopy with cystolitholapaxy/laser ablation and removal of bladder calculi. The patient denies any fever, chills or  N&V. After patient had given consent, removed urethral catheter. SP cath still in place and will need to be changed every 2 weeks with a 22f cath per [facility]. Patient caretaker was instructed to continue irrigation 3x a day. [facility] was in office at time of procedure. Patient is to follow up with nurse in 1 week for next SP tube change.

## 2022-10-25 ENCOUNTER — NURSE ONLY (OUTPATIENT)
Dept: UROLOGY | Age: 87
End: 2022-10-25
Payer: MEDICARE

## 2022-10-25 DIAGNOSIS — R33.9 URINARY RETENTION: ICD-10-CM

## 2022-10-25 DIAGNOSIS — Z93.59 CHRONIC SUPRAPUBIC CATHETER (HCC): ICD-10-CM

## 2022-10-25 DIAGNOSIS — N39.0 PURPLE URINE BAG SYNDROME (HCC): Primary | ICD-10-CM

## 2022-10-25 DIAGNOSIS — T83.518A PURPLE URINE BAG SYNDROME (HCC): Primary | ICD-10-CM

## 2022-10-25 PROCEDURE — 1123F ACP DISCUSS/DSCN MKR DOCD: CPT | Performed by: NURSE PRACTITIONER

## 2022-10-25 PROCEDURE — 99214 OFFICE O/P EST MOD 30 MIN: CPT | Performed by: NURSE PRACTITIONER

## 2022-10-25 PROCEDURE — 51705 CHANGE OF BLADDER TUBE: CPT | Performed by: NURSE PRACTITIONER

## 2022-10-25 RX ORDER — CEPHALEXIN 500 MG/1
500 CAPSULE ORAL 2 TIMES DAILY
Qty: 20 CAPSULE | Refills: 0 | Status: SHIPPED | OUTPATIENT
Start: 2022-10-25 | End: 2022-11-04

## 2022-10-25 ASSESSMENT — ENCOUNTER SYMPTOMS
NAUSEA: 0
VOMITING: 0
BACK PAIN: 0
ABDOMINAL DISTENTION: 0
ABDOMINAL PAIN: 1

## 2022-10-25 NOTE — PROGRESS NOTES
Corinne Domingo is a 80 y.o., male, Established patient who presents today   Chief Complaint   Patient presents with    Procedure     I am here today for a 2 week SP catheter change. HPI   Patient presents for catheter change after removal of bladder calculi by Dr. Bianca Bishop in the operating room on 9/28/2022. He has a very complicated history. He first underwent a TURP for urinary retention. Postoperatively he was unable to void and subsequently developed a bladder neck contracture. This has been recurrent despite transurethral resection and incision of bladder neck contracture on multiple occasions. Because of this and continued chronic urinary retention as well as recurrent bladder neck contracture, suprapubic tube was placed on 8/24/2021. Since the placement of the suprapubic tube, he has been having difficulty with blockage from encrustation of his suprapubic tube as well as encrustation of the balloon. This is improved somewhat since we have increased his size from 12 Western Lanie to 20 Western Lanie and initiated irrigation by his caregiver at least twice a day. However, he was still needing to be changed about every 1 to 2 weeks secondary to loss of patency. CT scan for further evaluation of this problem did reveal multiple stones in the patient's bladder and at this point, he was taken to the operating room as described in his operative note from 9/28/2022. The patient presented for catheter change today, and the nurse was having some difficulty removing the old catheter secondary to some balloon encrustation as the patient has experienced several times before. At that point, I was asked to evaluate the patient and was able to successfully remove and replace the catheter. I also evaluated a small 5 to 6 mm area just above the cystotomy site which did have some serous, almost purulent looking drainage without erythema to the surrounding area. Patient denies any fevers, chills, nausea, vomiting.   He continues to complain of suprapubic pain as well as penile pain which has been significantly unchanged since his very first TURP surgery. REVIEW OF SYSTEMS:  Review of Systems   Constitutional:  Negative for chills and fever. Gastrointestinal:  Positive for abdominal pain. Negative for abdominal distention, nausea and vomiting. Genitourinary:  Positive for difficulty urinating, hematuria and penile pain. Purple bag   Musculoskeletal:  Negative for back pain and gait problem. Psychiatric/Behavioral:  Negative for agitation and confusion. The patient is nervous/anxious. PHYSICAL EXAM:  There were no vitals taken for this visit. Physical Exam  Vitals and nursing note reviewed. Constitutional:       General: He is not in acute distress. Appearance: Normal appearance. He is not ill-appearing. Pulmonary:      Effort: Pulmonary effort is normal. No respiratory distress. Abdominal:      General: There is no distension. Tenderness: There is no abdominal tenderness. There is no right CVA tenderness or left CVA tenderness. Neurological:      Mental Status: He is alert and oriented to person, place, and time. Mental status is at baseline. Psychiatric:         Mood and Affect: Mood normal.         Behavior: Behavior normal.       ASSESSMENT/PLAN  1. Purple urine bag syndrome ([address]  Patient with peripheral back syndrome prior to changing catheter and some mild hematuria noted upon catheter exchange. We will go ahead and cover with Keflex as this should cover any skin infection as well as likely urinary bacteria. We will send his urine for culture to determine further treatment. - cephALEXin (KEFLEX) 500 MG capsule; Take 1 capsule by mouth 2 times daily for 10 days  Dispense: 20 capsule; Refill: 0  - Culture, Urine    2. Urinary retention  3. Chronic suprapubic catheter ([address]  Urinary retention managed with chronic suprapubic catheter.   Some difficulty in changing this catheter each time the patient is in office secondary to balloon encrustation. Again, and covering empirically with antibiotics that the patient has tolerated in the past.      Orders Placed This Encounter   Procedures    Culture, Urine     Order Specific Question:   Specify (ex-cath, midstream, cysto, etc)? Answer:   sp catheter specimen        Return in about 3 weeks (around 11/15/2022) for catheter change. An electronic signature was used to authenticate this note. BRYAN ROJAS - CNP    All information inputted into the note by the MA to include chief complaint, past medical history, past surgical history, medications, allergies, social and family history and review of systems has been reviewed and updated as needed by me. EMR Dragon/transcription disclaimer: Much of this document is electronic transcription/translation of spoken language to printed text. The electronic translation of spoken language may be erroneous or, at times, nonsensical words or phrases may be inadvertently transcribed.  Although I have reviewed the document for such errors, some may still exist.

## 2022-10-25 NOTE — PROGRESS NOTES
Chronic Suprapubic Catheter   Patient presents today with a history of a chronic suprapubic catheter for several months. The etiology is felt to be due to: Neurogenic bladder. Overall, the problem is changed (purple bag syndrome today as well as puss from abdominal insertion site of SP tube. Procedure Note (10/25/22):  Prior to old catheter removal nurse instilled sterile water into bladder and plugged catheter. Catheter balloon was drained of 10cc and Hibiclens was used to clean around SP using sterile technique. Patient was in discomfort when trying to remove old catheter and there was puss like drainage from insertion site. At this point nurse stopped and went got APRN for evaluation of patient and was switched from nurse visit to follow up with [facility]. [facility] was able to remove old catheter and place a new 22 fr catheter with about 60cc urine return. 10cc balloon was instilled with sterile water to keep catheter in place. Marisa then proceeded to flush catheter until clear with no debris. Pt tolerated well. A urine sample was collected from new catheter to send for culture due to purple bag today showing bacteria present. A new drainage bag placed. Marisa placed him on Keflex 500mg BID x10 days  until cx results return and if new atb will be needed at this point. Patient will follow up in 3 weeks for next catheter change with nurse and/or PRN. Pt was given 8 weeks worth of Myrbetriq 25 mg to help with bladder spasms due to insurance not covering.

## 2022-10-27 DIAGNOSIS — T83.518A PURPLE URINE BAG SYNDROME (HCC): Primary | ICD-10-CM

## 2022-10-27 DIAGNOSIS — N39.0 PURPLE URINE BAG SYNDROME (HCC): Primary | ICD-10-CM

## 2022-10-27 LAB
ORGANISM: ABNORMAL
ORGANISM: ABNORMAL
URINE CULTURE, ROUTINE: ABNORMAL

## 2022-10-27 RX ORDER — NITROFURANTOIN 25; 75 MG/1; MG/1
100 CAPSULE ORAL 2 TIMES DAILY
Qty: 28 CAPSULE | Refills: 0 | Status: SHIPPED | OUTPATIENT
Start: 2022-10-27 | End: 2022-11-10

## 2022-11-15 ENCOUNTER — NURSE ONLY (OUTPATIENT)
Dept: UROLOGY | Age: 87
End: 2022-11-15
Payer: MEDICARE

## 2022-11-15 DIAGNOSIS — R33.8 BENIGN PROSTATIC HYPERPLASIA WITH URINARY RETENTION: Primary | ICD-10-CM

## 2022-11-15 DIAGNOSIS — N40.1 BENIGN PROSTATIC HYPERPLASIA WITH URINARY RETENTION: Primary | ICD-10-CM

## 2022-11-15 PROCEDURE — 51705 CHANGE OF BLADDER TUBE: CPT | Performed by: NURSE PRACTITIONER

## 2022-11-15 NOTE — PROGRESS NOTES
Chronic Suprapubic Catheter   Patient presents today with a history of a chronic suprapubic catheter close to year. The etiology is felt to be due to: Neurogenic bladder. Overall, the problem is unchanged. Procedure Note (11/15/22):  Prior to removal pt sp tube was instilled with 60 cc sterile water and then plugged. 10cc balloon was deflated. The patient's suprapubic catheter was removed. Using sterile technique patient was cleansed with Hibiclens and sterile water solution, 22  Albanian SP catheter was inserted into the bladder, 60 urine was drained from bladder, 10 mL of sterile water was used to fill up the catheter balloon and catheter was then hooked up to leg drainage bag. The patient tolerated the procedure well. Lelon Ormond, APRN was in office at time of procedure. Patient will follow up in 3 weeks for next SP catheter change.

## 2022-12-06 ENCOUNTER — NURSE ONLY (OUTPATIENT)
Dept: UROLOGY | Age: 87
End: 2022-12-06
Payer: MEDICARE

## 2022-12-06 DIAGNOSIS — R33.8 BENIGN PROSTATIC HYPERPLASIA WITH URINARY RETENTION: Primary | ICD-10-CM

## 2022-12-06 DIAGNOSIS — N32.0 BLADDER NECK CONTRACTURE: ICD-10-CM

## 2022-12-06 DIAGNOSIS — N40.1 BENIGN PROSTATIC HYPERPLASIA WITH URINARY RETENTION: Primary | ICD-10-CM

## 2022-12-06 PROCEDURE — 51705 CHANGE OF BLADDER TUBE: CPT | Performed by: NURSE PRACTITIONER

## 2022-12-06 NOTE — PROGRESS NOTES
Chronic Suprapubic Catheter   Patient presents today with a history of a chronic suprapubic catheter close to year. The etiology is felt to be due to: Neurogenic bladder. Overall, the problem is unchanged. Procedure Note (12/06/22):  Prior to removal pt sp tube was instilled with 60 cc sterile water and then plugged. 10cc balloon was deflated. The patient's suprapubic catheter was removed. Using sterile technique patient was cleansed with Hibiclens and sterile water solution, 22  Uzbek SP catheter was inserted into the bladder, 60 urine was drained from bladder, 10 mL of sterile water was used to fill up the catheter balloon and catheter was then hooked up to leg drainage bag. The patient tolerated the procedure well. BRYAN Newell was in office at time of procedure. Patient will follow up in 3 weeks for next SP catheter change.

## 2022-12-22 DIAGNOSIS — Z93.59 CHRONIC SUPRAPUBIC CATHETER (HCC): ICD-10-CM

## 2022-12-22 DIAGNOSIS — N32.0 BLADDER NECK CONTRACTURE: ICD-10-CM

## 2022-12-22 DIAGNOSIS — N21.0 BLADDER STONE: ICD-10-CM

## 2022-12-22 RX ORDER — HYDROCODONE BITARTRATE AND ACETAMINOPHEN 5; 325 MG/1; MG/1
1 TABLET ORAL EVERY 6 HOURS PRN
Qty: 120 TABLET | Refills: 0 | Status: SHIPPED | OUTPATIENT
Start: 2022-12-22 | End: 2023-01-21

## 2022-12-27 ENCOUNTER — NURSE ONLY (OUTPATIENT)
Dept: UROLOGY | Age: 87
End: 2022-12-27

## 2022-12-27 DIAGNOSIS — N32.0 BLADDER NECK CONTRACTURE: Primary | ICD-10-CM

## 2022-12-27 DIAGNOSIS — G89.4 CHRONIC PAIN SYNDROME: Primary | ICD-10-CM

## 2022-12-27 RX ORDER — HYDROCODONE BITARTRATE AND ACETAMINOPHEN 10; 325 MG/1; MG/1
1 TABLET ORAL 4 TIMES DAILY
Qty: 120 TABLET | Refills: 0 | Status: SHIPPED | OUTPATIENT
Start: 2022-12-27 | End: 2023-01-26

## 2022-12-27 NOTE — PROGRESS NOTES
Chronic Suprapubic Catheter   Patient presents today with a history of a chronic suprapubic catheter close to year. The etiology is felt to be due to: Neurogenic bladder. Overall, the problem is unchanged. Procedure Note (12/27/22): Prior to removal pt sp tube was instilled with 60 cc sterile water and then plugged. 10cc balloon was deflated. The patient's suprapubic catheter was removed. Using sterile technique patient was cleansed with Hibiclens and sterile water solution, 22  Polish SP catheter was inserted into the bladder, 30 urine was drained from bladder, 10 mL of sterile water was used to fill up the catheter balloon and catheter was then hooked up to leg drainage bag. The patient tolerated the procedure well. BRYAN Faye was in office at time of procedure. Patient will follow up in 3 weeks for next SP catheter change.

## 2022-12-29 DIAGNOSIS — G89.4 CHRONIC PAIN SYNDROME: Primary | ICD-10-CM

## 2022-12-29 RX ORDER — FENTANYL 25 UG/H
1 PATCH TRANSDERMAL
Qty: 10 PATCH | Refills: 0 | Status: SHIPPED | OUTPATIENT
Start: 2022-12-29 | End: 2023-01-28

## 2022-12-30 DIAGNOSIS — C4A.30: Primary | ICD-10-CM

## 2023-01-04 ENCOUNTER — HOSPITAL ENCOUNTER (OUTPATIENT)
Dept: INFUSION THERAPY | Age: 88
Discharge: HOME | End: 2023-01-04
Payer: MEDICARE

## 2023-01-04 ENCOUNTER — OFFICE VISIT (OUTPATIENT)
Dept: HEMATOLOGY | Age: 88
End: 2023-01-04
Payer: MEDICARE

## 2023-01-04 VITALS
HEIGHT: 67 IN | DIASTOLIC BLOOD PRESSURE: 65 MMHG | SYSTOLIC BLOOD PRESSURE: 102 MMHG | OXYGEN SATURATION: 99 % | HEART RATE: 77 BPM | BODY MASS INDEX: 17.89 KG/M2 | TEMPERATURE: 97.9 F | WEIGHT: 114 LBS

## 2023-01-04 DIAGNOSIS — C7B.1 METASTATIC MERKEL CELL CARCINOMA TO LIVER (HCC): ICD-10-CM

## 2023-01-04 DIAGNOSIS — Z78.9 POOR PROGNOSIS: ICD-10-CM

## 2023-01-04 DIAGNOSIS — Z71.89 CARE PLAN DISCUSSED WITH PATIENT: ICD-10-CM

## 2023-01-04 DIAGNOSIS — D64.9 NORMOCYTIC ANEMIA: ICD-10-CM

## 2023-01-04 DIAGNOSIS — C4A.4 MERKEL CELL CARCINOMA OF SCALP (HCC): Primary | ICD-10-CM

## 2023-01-04 DIAGNOSIS — D69.6 THROMBOCYTOPENIA (HCC): ICD-10-CM

## 2023-01-04 DIAGNOSIS — C4A.30: ICD-10-CM

## 2023-01-04 LAB
BASOPHILS ABSOLUTE: 0.08 K/UL (ref 0.01–0.08)
BASOPHILS RELATIVE PERCENT: 0.7 % (ref 0.1–1.2)
EOSINOPHILS ABSOLUTE: 0.03 K/UL (ref 0.04–0.54)
EOSINOPHILS RELATIVE PERCENT: 0.3 % (ref 0.7–7)
HCT VFR BLD CALC: 26.3 % (ref 40.1–51)
HEMOGLOBIN: 8.2 G/DL (ref 13.7–17.5)
LYMPHOCYTES ABSOLUTE: 1.26 K/UL (ref 1.18–3.74)
LYMPHOCYTES RELATIVE PERCENT: 11 % (ref 19.3–53.1)
MCH RBC QN AUTO: 28.4 PG (ref 25.7–32.2)
MCHC RBC AUTO-ENTMCNC: 31.2 G/DL (ref 32.3–36.5)
MCV RBC AUTO: 91 FL (ref 79–92.2)
MONOCYTES ABSOLUTE: 1 K/UL (ref 0.24–0.82)
MONOCYTES RELATIVE PERCENT: 8.8 % (ref 4.7–12.5)
NEUTROPHILS ABSOLUTE: 8.27 K/UL (ref 1.56–6.13)
NEUTROPHILS RELATIVE PERCENT: 72.4 % (ref 34–71.1)
PDW BLD-RTO: 17.3 % (ref 11.6–14.4)
PLATELET # BLD: 87 K/UL (ref 163–337)
PMV BLD AUTO: 11.3 FL (ref 7.4–10.4)
RBC # BLD: 2.89 M/UL (ref 4.63–6.08)
WBC # BLD: 11.42 K/UL (ref 4.23–9.07)

## 2023-01-04 PROCEDURE — 1036F TOBACCO NON-USER: CPT | Performed by: INTERNAL MEDICINE

## 2023-01-04 PROCEDURE — 85025 COMPLETE CBC W/AUTO DIFF WBC: CPT

## 2023-01-04 PROCEDURE — G8419 CALC BMI OUT NRM PARAM NOF/U: HCPCS | Performed by: INTERNAL MEDICINE

## 2023-01-04 PROCEDURE — 1123F ACP DISCUSS/DSCN MKR DOCD: CPT | Performed by: INTERNAL MEDICINE

## 2023-01-04 PROCEDURE — 99204 OFFICE O/P NEW MOD 45 MIN: CPT | Performed by: INTERNAL MEDICINE

## 2023-01-04 PROCEDURE — 36415 COLL VENOUS BLD VENIPUNCTURE: CPT

## 2023-01-04 PROCEDURE — G8427 DOCREV CUR MEDS BY ELIG CLIN: HCPCS | Performed by: INTERNAL MEDICINE

## 2023-01-04 PROCEDURE — G8484 FLU IMMUNIZE NO ADMIN: HCPCS | Performed by: INTERNAL MEDICINE

## 2023-01-04 PROCEDURE — 99212 OFFICE O/P EST SF 10 MIN: CPT

## 2023-01-04 NOTE — PROGRESS NOTES
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 He also has a diagnosis of Alzheimer's. He is currently in a nursing home. He is accompanied by his neighbors. 12/13/22 Mohs excision right superior medial forehead by Dr Yonny Boyd  12/13/22 Skin, right superior medial forehead: Cutaneous high-grade neuroendocrine (Ildefonso cell) carcinoma. Skin, right superior medial forehead: Positive for cutaneous high-grade neuroendocrine carcinoma. Tumor size: at least 1.5cm in greatest histologic extent. Tumor thickness: approximately 8mm. Lymphovascular invasion: not definitively identified. Perineural invasion: present. Tumor extension: not identified. Mitotic rate: greater than 10/mm2. Tumor infiltrating lymphocytes: not identified. Tumor growth pattern. Nodular and infiltrative. Margins: peripheral margin: positive. Deep margin indeterminate. Pathologic stage: pT1  12/17/22 CT head [facility]): Laceration with surrounding contusion within the anterolateral aspect of the right frontal region of the scalp. Approximately 2.5cm in greatest dimension incompletely visualized soft tissue density mass arising from the right parotid gland. An elective dedicated CT soft tissues neck is suggested. Left ethmoid sinusitis.  Extremely minimal periventricular deep white matter disease, almost certainly of the ischemic small vessel variety. 12/17/22 CT abd/pelvis ([address]): Severe hepatic metastatic disease, new from 2/25/21. Extremely minimal posterior bibasilar pneumonitis. A small benign right renal cyst is again noted. The balloon of a suprapubic catheter is seen within the urinary bladder, which is almost completely collapse, new. The patient is status post resection of the prostate and seminal vesicles, new. Colonic diverticulosis. There is no suggestion of diverticulitis. 12/17/22 CXR ([address] [facility] [address]): negative  12/19/22 Liver, biopsies: Small cell carcinoma. 1/4/2023-he was first seen by me. I reviewed results of imaging studies, pathology. Unfortunately, the patient has a diagnosis of Alzheimer's. He is frail appearing and has significant deconditioning. We discussed about treatment options with his neighbors. He is not a candidate for further anticancer therapy at this time due to the conditions above. I have recommended enrollment with hospice.       Past Medical History:    Past Medical History:   Diagnosis Date    Arthritis     Bladder neck contracture     Diabetes mellitus ([facility] [address]     History of blood transfusion     Hyperlipidemia     Hypertension     Immunization due     Neuropathy     Palliative care patient 09/20/2021       Past Surgical History:    Past Surgical History:   Procedure Laterality Date    COLONOSCOPY      CYSTOSCOPY N/A 8/24/2021    SUPRAPUBIC TUBE PLACEMENT performed by Abdi Charles MD at [address] N/A 9/16/2021    CYSTOSCOPY TRANSURETHRAL [place] BLADDER Bladder neck contracture ; exchange supra pubic catheter performed by Abdi Charles MD at [address] N/A 11/16/2021    CYSTOSCOPY TRANSURETHRAL EXCISION BLADDER NECK, SUPRAPUBIC CATHETER EXCHANGE (16f) performed by Abdi Charles MD at [address] N/A 9/28/2022    CYSTOSCOPY; SUPRAPUBIC CATHETER EXCHANGE; TRANSURTERTHERAL RESECTION OF BLADDER NECK; REMOVAL BLADDER CALCULI COMPLEX; LASER ABLATION performed by Sari Matthews MD at . [address], COLON, DIAGNOSTIC      EYE SURGERY Bilateral     cataract    NOSE SURGERY      SKIN BIOPSY      TONSILLECTOMY      TURP N/A 4/14/2021    TRANSURETHRAL RESECTION PROSTATE performed by Sari Matthews MD at [facility] OR       Social History:    Marital status:   ETOH status: No  Resides: [place], [place]    Family History:   No family history on file. Current Hospital Medications:    Current Outpatient Medications   Medication Sig Dispense Refill    fentaNYL (DURAGESIC) 25 MCG/HR Place 1 patch onto the skin every 3 days for 30 days. Max Daily Amount: 1 patch 10 patch 0    HYDROcodone-acetaminophen (NORCO)  MG per tablet Take 1 tablet by mouth in the morning, at noon, in the evening, and at bedtime for 30 days. Max Daily Amount: 4 tablets 120 tablet 0    mirabegron (MYRBETRIQ) 25 MG TB24 Take 25 mg by mouth daily      Water For Irrigation, Sterile (STERILE WATER FOR IRRIGATION) Irrigate with 120 mLs as directed 2 times daily Irrigate with as directed for 1 dose. 7200 mL 11    aspirin 81 MG EC tablet Take 81 mg by mouth daily      lisinopril (PRINIVIL;ZESTRIL) 5 MG tablet Take 5 mg by mouth daily      memantine (NAMENDA) 10 MG tablet Take 10 mg by mouth 2 times daily      vitamin E 400 UNIT capsule Take 400 Units by mouth daily      Cranberry 500 MG TABS Take 1 tablet by mouth daily      Multiple Vitamins-Minerals (MULTIVITAMIN MEN 50+) TABS Take 1 tablet by mouth daily      omeprazole (PRILOSEC) 20 MG delayed release capsule Take 20 mg by mouth daily       mirtazapine (REMERON) 30 MG tablet Take 15 mg by mouth nightly       atorvastatin (LIPITOR) 20 MG tablet Take 20 mg by mouth daily Take 1/2 tab at bedtime.        verapamil (CALAN) 120 MG tablet Take 120 mg by mouth 2 times daily       donepezil (ARICEPT) 10 MG tablet Take 10 mg by mouth nightly melatonin 3 MG TABS tablet Take 3 mg by mouth nightly as needed Take 2 hs prn       metFORMIN (GLUCOPHAGE) 1000 MG tablet Take 500 mg by mouth 2 times daily (with meals)        No current facility-administered medications for this visit. Allergies: Allergies   Allergen Reactions    Pcn [Penicillins] Hives     \"Huge dose of PCN\"         Subjective   REVIEW OF SYSTEMS:   CONSTITUTIONAL: no fever, no night sweats,  fatigue;  HEENT: no blurring of vision, no double vision, no hearing difficulty, no tinnitus, no ulceration, no dysplasia, no epistaxis;  LUNGS: no cough, no hemoptysis, no wheeze,  no shortness of breath;  CARDIOVASCULAR: no palpitation, no chest pain, no shortness of breath;  GI: no abdominal pain, no nausea, no vomiting, no diarrhea, no constipation;  LAURIE: no dysuria, no hematuria, no frequency or urgency, no nephrolithiasis;  MUSCULOSKELETAL: no joint pain, no swelling, no stiffness;  ENDOCRINE: no polyuria, no polydipsia, no cold or heat intolerance;  HEMATOLOGY: no easy bruising or bleeding, no history of clotting disorder;  DERMATOLOGY: no skin rash, no eczema, no pruritus;  PSYCHIATRY: no depression, no anxiety, no panic attacks, no suicidal ideation, no homicidal ideation;  NEUROLOGY: no syncope, no seizures, no numbness or tingling of hands, no numbness or tingling of feet, no paresis;    Objective   /65   Pulse 77   Temp 97.9 °F (36.6 °C)   Ht 5' 7\" (1.702 m)   Wt 114 lb (51.7 kg)   SpO2 99%   BMI 17.85 kg/m²     PHYSICAL EXAM:  CONSTITUTIONAL: Alert, appropriate, no acute distress  EYES: Non icteric, EOM intact, pupils equal round   ENT: Mucus membranes moist, no oral pharyngeal lesions, external inspection of ears and nose are normal  NECK: Supple, no masses. No palpable thyroid mass  CHEST/LUNGS: CTA bilaterally, normal respiratory effort   CARDIOVASCULAR: RRR, no murmurs. No lower extremity edema  ABDOMEN: soft non-tender, active bowel sounds, no HSM.   No palpable masses  EXTREMITIES: warm, full ROM in all 4 extremities, no focal weakness. SKIN: warm, dry with no rashes or lesions  LYMPH: No cervical, clavicular, axillary, or inguinal lymphadenopathy  NEUROLOGIC: follows commands, non focal   PSYCH: mood and affect appropriate. Alert and oriented to time, place, person      LABORATORY RESULTS REVIEWED/ANALYZED BY ME:  Lab Results   Component Value Date    WBC 11.42 (H) 01/04/2023    HGB 8.2 (L) 01/04/2023    HCT 26.3 (LL) 01/04/2023    MCV 91.0 01/04/2023    PLT 87 (L) 01/04/2023     Lab Results   Component Value Date    NEUTROABS 8.27 (H) 01/04/2023       RADIOLOGY STUDIES REVIEWED BY ME:  As above      ASSESSMENT:    Orders Placed This Encounter   Procedures    External Referral To Hospice     Referral Priority:   Routine     Referral Type:   Eval and Treat     Referral Reason:   Specialty Services Required     Requested Specialty:   Hospice and Palliative Medicine     Number of Visits Requested:   1        Sariah Aparicio was seen today for new patient. Diagnoses and all orders for this visit:    Merkel cell carcinoma of scalp [facility])  -     External Referral To Hospice    Care plan discussed with patient    Metastatic Merkel cell carcinoma to liver [facility])  -     External Referral To Hospice    Poor prognosis  -     External Referral To Hospice    Normocytic anemia    Thrombocytopenia (HCC)       Cutaneous neuroendocrine (Ildefonso cell) carcinoma, right forehead, Dec 2022, High grade, stage IV-Liver mets  -12/13/22 Mohs excision right superior medial forehead by Dr Brayan Schilling  -12/17/22 CT abd/pelvis ([address] [place] [address] [address]): Severe hepatic metastatic disease, new from 2/25/21.  -12/19/22 Liver, biopsies: Small cell carcinoma. Cancer related anemia-continue to monitor    Thrombocytopenia-unknown etiology    Poor prognosis-stage IV disease, frailty. Not a candidate for anticancer therapy due to frailty/advanced age and comorbidities. Recommended hospice care.       PLAN:  RTC with MD as needed  Refer to Hospice      VIOLETA, Danelle Woodruff am pre-charting as a registered nurse for Leo Pineda MD. Electronically signed by Danelle Woodruff RN on 1/4/2023 at 7:30 AM CST. Gary Felder, am scribing for Leo Pineda MD. Electronically signed by Danelle Woodruff RN on 1/4/2023 at 2:52 PM CST. I, Dr Cristina Zambrano, personally performed the services described in this documentation as scribed by Danelle Woodruff RN in my presence and is both accurate and complete. I have seen, examined and reviewed this patient medication list, appropriate labs and imaging studies. I reviewed relevant medical records and others physicians notes. I discussed the plans of care with the patient. I answered all the questions to the patients satisfaction. I have also reviewed the chief complaint (CC) and part of the history (History of Present Illness (HPI), Past Family Social History [facility]), or Review of Systems (ROS) and made changes when appropriated. (Please note that portions of this note were completed with a voice recognition program. Efforts were made to edit the dictations but occasionally words are mis-transcribed. )Electronically signed by Leo Pineda MD on 1/4/2023 at 4:13 PM      The total time, 50 min I spent to see the patient today includes at least one or more of the following: preparing to see the patient by reviewing prior tests, prior notes or other relevant information, performing appropriate independent examination and evaluation, counseling, ordering of referral, documenting clinic information in the electronic medical record or other health records, independently interpreting results of tests, managing test results and communicating the results to the patient/family or caregiver.

## 2023-01-05 ASSESSMENT — PROMIS GLOBAL HEALTH SCALE
IN GENERAL, HOW WOULD YOU RATE YOUR PHYSICAL HEALTH [ON A SCALE OF 1 (POOR) TO 5 (EXCELLENT)]?: 1
IN GENERAL, HOW WOULD YOU RATE YOUR SATISFACTION WITH YOUR SOCIAL ACTIVITIES AND RELATIONSHIPS [ON A SCALE OF 1 (POOR) TO 5 (EXCELLENT)]?: 2
IN GENERAL, HOW WOULD YOU RATE YOUR MENTAL HEALTH, INCLUDING YOUR MOOD AND YOUR ABILITY TO THINK [ON A SCALE OF 1 (POOR) TO 5 (EXCELLENT)]?: 2
TO WHAT EXTENT ARE YOU ABLE TO CARRY OUT YOUR EVERYDAY PHYSICAL ACTIVITIES SUCH AS WALKING, CLIMBING STAIRS, CARRYING GROCERIES, OR MOVING A CHAIR [ON A SCALE OF 1 (NOT AT ALL) TO 5 (COMPLETELY)]?: 2
IN THE PAST 7 DAYS, HOW OFTEN HAVE YOU BEEN BOTHERED BY EMOTIONAL PROBLEMS, SUCH AS FEELING ANXIOUS, DEPRESSED, OR IRRITABLE [ON A SCALE FROM 1 (NEVER) TO 5 (ALWAYS)]?: 3
SUM OF RESPONSES TO QUESTIONS 2, 4, 5, & 10: 8
IN GENERAL, WOULD YOU SAY YOUR QUALITY OF LIFE IS...[ON A SCALE OF 1 (POOR) TO 5 (EXCELLENT)]: 1
IN GENERAL, PLEASE RATE HOW WELL YOU CARRY OUT YOUR USUAL SOCIAL ACTIVITIES (INCLUDES ACTIVITIES AT HOME, AT WORK, AND IN YOUR COMMUNITY, AND RESPONSIBILITIES AS A PARENT, CHILD, SPOUSE, EMPLOYEE, FRIEND, ETC) [ON A SCALE OF 1 (POOR) TO 5 (EXCELLENT)]?: 1
IN THE PAST 7 DAYS, HOW WOULD YOU RATE YOUR FATIGUE ON AVERAGE [ON A SCALE FROM 1 (NONE) TO 5 (VERY SEVERE)]?: 2
IN THE PAST 7 DAYS, HOW WOULD YOU RATE YOUR PAIN ON AVERAGE [ON A SCALE FROM 0 (NO PAIN) TO 10 (WORST IMAGINABLE PAIN)]?: 7
SUM OF RESPONSES TO QUESTIONS 3, 6, 7, & 8: 12
IN GENERAL, WOULD YOU SAY YOUR HEALTH IS...[ON A SCALE OF 1 (POOR) TO 5 (EXCELLENT)]: 1

## 2023-01-09 DIAGNOSIS — C79.9 METASTATIC MALIGNANT NEOPLASM, UNSPECIFIED SITE (HCC): Primary | ICD-10-CM

## 2023-01-09 RX ORDER — MORPHINE SULFATE 100 MG/5ML
5 SOLUTION ORAL
Qty: 30 ML | Refills: 0 | Status: SHIPPED | OUTPATIENT
Start: 2023-01-09 | End: 2023-01-19

## 2023-02-01 ENCOUNTER — TELEPHONE (OUTPATIENT)
Dept: HEMATOLOGY | Age: 88
End: 2023-02-01

## 2023-02-01 NOTE — TELEPHONE ENCOUNTER
Claudeen Blonder, CSW called patient, SW once realizing patients residence, saw note to self regarding patients status. SW not to follow up in the future.

## 2023-03-06 NOTE — H&P
Aroldo Paulson is a 80 y.o. male who presents today        Chief Complaint   Patient presents with    Post-Op Check       I am here for my post op visit and to discuss my next surgery      Patient is status post TURP done on 4/14/2021. He came in to see the nurse practitioner on 8/24/2021 with complaints of dysuria and not been able to void getting worse over the last 3 weeks. Bladder scan in the office revealed a residual of 408 cc on exam he had some bladder distention. Attempts by the practitioner to place Harper catheter were unsuccessful. Office cystoscopy done that same day on 8/24/2021 showed complete occlusion of his bladder neck consistent with a contracture. Therefore he was taken the operating room and underwent suprapubic tube placement. 700 cc was drained from his bladder. He now comes in for follow-up to discuss further options of care. He is complaining of some discomfort at the suprapubic tube site but he mostly complains of the suprapubic tube coming loose were connected to the tubing. This is happened to him a few times at night.   He denies any hematuria no abdominal pain no fever.           Past Medical History        Past Medical History:   Diagnosis Date    Arthritis      Diabetes mellitus ([facility] [address]      History of blood transfusion      Hyperlipidemia      Hypertension      Immunization due      Neuropathy              Past Surgical History         Past Surgical History:   Procedure Laterality Date    COLONOSCOPY        CYSTOSCOPY N/A 8/24/2021     SUPRAPUBIC TUBE PLACEMENT performed by Trisha Field MD at [address] [place] [address], DIAGNOSTIC        EYE SURGERY Bilateral       cataract    NOSE SURGERY        SKIN BIOPSY        TONSILLECTOMY        TURP N/A 4/14/2021     TRANSURETHRAL RESECTION PROSTATE performed by Trisha Field MD at [facility] OR            Current Facility-Administered Medications          Current Outpatient Medications   Medication Sig Dispense Refill    indomethacin (INDOCIN) 25 MG capsule TAKE 1 CAPSULE BY MOUTH THREE TIMES DAILY AS NEEDED WITH FOOD        diclofenac sodium (VOLTAREN) 1 % GEL APPLY TWO TO FOUR GRAMS TO AFFECTED AREA TWICE DAILY AS NEEDED FOR PAIN        acetaminophen (TYLENOL) 500 MG tablet Take 2 tablets by mouth 4 times daily as needed for Pain 1 tablet 0    ibuprofen (ADVIL;MOTRIN) 600 MG tablet Take 1 tablet by mouth 2 times daily HOLD THIS MEDICATION FOR AT LEAST 5 DAYS 120 tablet 3    omeprazole (PRILOSEC) 20 MG delayed release capsule Take 20 mg by mouth daily        gabapentin (NEURONTIN) 400 MG capsule Take 400 mg by mouth 3 times daily.        guaiFENesin 400 MG tablet Take 400 mg by mouth 4 times daily as needed for Cough        mirtazapine (REMERON) 30 MG tablet Take 30 mg by mouth nightly        atorvastatin (LIPITOR) 20 MG tablet Take 20 mg by mouth daily Take 1/2 tab at bedtime.        verapamil (CALAN) 120 MG tablet Take 120 mg by mouth 2 times daily        donepezil (ARICEPT) 10 MG tablet Take 10 mg by mouth nightly        melatonin 3 MG TABS tablet Take 3 mg by mouth nightly as needed Take 2 hs prn        metFORMIN (GLUCOPHAGE) 1000 MG tablet Take 1,000 mg by mouth 2 times daily (with meals)          No current facility-administered medications for this visit.                  Allergies   Allergen Reactions    Pcn [Penicillins] Hives       \"Huge dose of PCN\"         Social History   Social History      Socioeconomic History    Marital status:         Spouse name: None    Number of children: 0    Years of education: None    Highest education level: None   Occupational History    None   Tobacco Use    Smoking status: Former Smoker       Types: Cigars       Quit date:        Years since quittin.7    Smokeless tobacco: Never Used   Vaping Use    Vaping Use: Never used   Substance and Sexual Activity    Alcohol use: Not Currently    Drug use: Never    Sexual activity: None   Other Topics Concern    None   Social History Narrative    None      Social Determinants of Health          Financial Resource Strain:     Difficulty of Paying Living Expenses:    Food Insecurity:     Worried About Running Out of Food in the Last Year:     Ran Out of Food in the Last Year:    Transportation Needs:     Lack of Transportation (Medical):  Lack of Transportation (Non-Medical):    Physical Activity:     Days of Exercise per Week:     Minutes of Exercise per Session:    Stress:     Feeling of Stress :    Social Connections:     Frequency of Communication with Friends and Family:     Frequency of Social Gatherings with Friends and Family:     Attends [employer] Services:     Active Member of Clubs or Organizations:     Attends Club or Organization Meetings:     Marital Status:    Intimate Partner Violence:     Fear of Current or Ex-Partner:     Emotionally Abused:     Physically Abused:     Sexually Abused:             Family History   History reviewed. No pertinent family history.        REVIEW OF SYSTEMS:  Review of Systems   Constitutional: Negative for chills and fever. HENT: Negative for congestion and sore throat. Eyes: Negative for pain and visual disturbance. Respiratory: Negative for cough and wheezing. Cardiovascular: Negative for chest pain and palpitations. Gastrointestinal: Negative for nausea and vomiting. Endocrine: Negative for polyphagia and polyuria. Genitourinary: Positive for difficulty urinating (has SP cath (no UA needed) ). Negative for decreased urine volume, discharge, dysuria, enuresis, flank pain, frequency, genital sores, hematuria, penile pain, penile swelling, scrotal swelling, testicular pain and urgency. Musculoskeletal: Negative for back pain and neck pain. Skin: Negative for rash and wound. Allergic/Immunologic: Negative for environmental allergies and food allergies. Neurological: Negative for dizziness and headaches. Hematological: Negative for adenopathy. Does not bruise/bleed easily. Psychiatric/Behavioral: Negative for confusion and hallucinations. All other systems reviewed and are negative.           PHYSICAL EXAM:  There were no vitals taken for this visit. Physical Exam  Constitutional:       General: He is not in acute distress. Appearance: Normal appearance. He is well-developed. HENT:      Head: Normocephalic and atraumatic. Nose: Nose normal.   Eyes:      General: No scleral icterus. Conjunctiva/sclera: Conjunctivae normal.      Pupils: Pupils are equal, round, and reactive to light. Neck:      Trachea: No tracheal deviation. Cardiovascular:      Rate and Rhythm: Normal rate and regular rhythm. Pulmonary:      Effort: Pulmonary effort is normal. No respiratory distress. Breath sounds: No stridor. Abdominal:      General: There is no distension. Palpations: Abdomen is soft. There is no mass. Tenderness: There is no abdominal tenderness. Comments: Suprapubic tube site shows some expected skin reaction from the catheter. No exudate mild tenderness. SP tube is draining clear urine   Genitourinary:     Penis: Normal.       Testes: Normal.   Musculoskeletal:         General: No tenderness. Normal range of motion. Cervical back: Normal range of motion and neck supple. Lymphadenopathy:      Cervical: No cervical adenopathy. Skin:     General: Skin is warm and dry. Findings: No erythema. Neurological:      Mental Status: He is alert and oriented to person, place, and time.    Psychiatric:         Behavior: Behavior normal.         Judgment: Judgment normal.                  DATA:  CBC:         Lab Results   Component Value Date     WBC 10.8 08/24/2021     RBC 4.81 08/24/2021     HGB 13.5 08/24/2021     HCT 42.7 08/24/2021     MCV 88.8 08/24/2021     MCH 28.1 08/24/2021     MCHC 31.6 08/24/2021     RDW 14.2 08/24/2021      08/24/2021     MPV 10.9 08/24/2021      BMP:          Lab Results   Component Value Date      08/24/2021     K 3.8 08/24/2021     K 4.0 04/15/2021      08/24/2021     CO2 25 08/24/2021     BUN 16 08/24/2021     CREATININE 0.7 08/24/2021     CALCIUM 9.7 08/24/2021     GFRAA >59 08/24/2021     LABGLOM >60 08/24/2021     GLUCOSE 105 08/24/2021      1. Benign prostatic hyperplasia with urinary retention  He status post TURP. He has now developed bladder neck contracture with complete occlusion.     2. Bladder neck contracture  Cystoscopy showed complete occlusion of the bladder neck this was managed with SP tube placement. Now that SP tube has been in place for a few weeks to track should be mature and I recommend that we attempt a \"cut to the light\" procedure to see if we can get his bladder neck open. We will plan for flexible cystoscopy through the suprapubic tube site and transurethral resection incision and opening of the bladder neck. I did discuss with the patient that this has significant risk for recurrent scarring we discussed the risk of injury to adjacent organs. I told him I would leave the suprapubic tube in place for period of time to make sure the contracture does not grow back after resection. We also discussed the option of just chronic SP tube with changes every month.   He wants to attempt to try to be without a tube.     3. Urinary retention  He does understand that he had urinary retention preop TURP and despite resection of bladder neck contracture he still may have retention postop due to poor detrusor contractility, atonic bladder        No orders of the defined types were placed in this encounter.        Return for PT to be scheduled for Surgery.     All information inputted into the note by the MA to include chief complaint, past medical history, past surgical history, medications, allergies, social and family history and review of systems has been reviewed and updated as needed by me.     EMR Dragon/transcription disclaimer: Much of this documentt is electronic  transcription/translation of spoken language to printed text. The  electronic translation of spoken language may be erroneous, or at times,  nonsensical words or phrases may be inadvertently transcribed.  Although I  have reviewed the document for such errors, some may still exist. hypertension

## (undated) DEVICE — BAG DRNGE COMB PK

## (undated) DEVICE — LARYNGOSCOPE BLDE MAC HNDL M SZ 35 ST CURAPLEX CURAVIEW LED

## (undated) DEVICE — GOWN,PREVENTION PLUS,2XL,ST,22/CS: Brand: MEDLINE

## (undated) DEVICE — TOWEL,OR,DSP,ST,BLUE,DLX,4/PK,20PK/CS: Brand: MEDLINE

## (undated) DEVICE — SURGICAL PROCEDURE PACK CYTOSCOPY

## (undated) DEVICE — COVER,TABLE,44X90,STERILE: Brand: MEDLINE

## (undated) DEVICE — CATHETER,FOLEY,SILI-ELAST,LTX,16FR,10ML: Brand: MEDLINE

## (undated) DEVICE — CATHETER ETER URETH 16FR 30CC HYDRGEL F 2 W DOVER

## (undated) DEVICE — SUTURE PERMA-HAND SZ 2-0 L30IN NONABSORBABLE BLK L26MM SH K833H

## (undated) DEVICE — GUIDEWIRE VASC L145CM DIA0.035IN TIP L4CM PTFE S STL SHT

## (undated) DEVICE — HOLDER NDL FOAM BLK ADH BK

## (undated) DEVICE — Y-TYPE TUR/BLADDER IRRIGATION SET, REGULATING CLAMP

## (undated) DEVICE — GAUZE,SPONGE,FLUFF,6"X6.75",STRL,10/TRAY: Brand: MEDLINE

## (undated) DEVICE — SYRINGE,PISTON,IRRIGATION,60ML,STERILE: Brand: MEDLINE

## (undated) DEVICE — 60 ML SYRINGE,CATHETER TIP: Brand: MONOJECT

## (undated) DEVICE — CUTTING LOOP, BIPOLAR, 24/26 FR.: Brand: N.A.

## (undated) DEVICE — SOLUTION IV IRRIG WATER 1000ML POUR BRL 2F7114

## (undated) DEVICE — Device

## (undated) DEVICE — SUTURE PERMAHAND SZ 2-0 L18IN NONABSORBABLE BLK L26MM FS 685G

## (undated) DEVICE — SOLUTION IV IRRIG POUR BRL 0.9% SODIUM CHL 2F7124

## (undated) DEVICE — FIBER LSR 365UM 6J 80HZ 120W D F L FOR LITHO MOSES

## (undated) DEVICE — ELECTRODE,COAG,STERILE,POINTED: Brand: N.A.

## (undated) DEVICE — PACK,UNIVERSAL,NO GOWNS: Brand: MEDLINE

## (undated) DEVICE — SYRINGE ONLY,20ML LUER LOCK: Brand: MEDLINE INDUSTRIES, INC.

## (undated) DEVICE — CATHETER,FOLEY,3WAY,SILI-ELAST,22FR,30ML: Brand: MEDLINE

## (undated) DEVICE — BAG URIN DRNAGE 2000ML TB L48IN NDL SAMP ANTIREFLX CHMBR DRN

## (undated) DEVICE — BAG,LEG,COMFORT-STRAPS,LARGE,32OZ: Brand: MEDLINE

## (undated) DEVICE — SOLUTION IRRIG 3000ML STRL H2O USP UROMATIC PLAS CONT

## (undated) DEVICE — SOLUTION IRRIG 3000ML 0.9% SOD CHL USP UROMATIC PLAS CONT

## (undated) DEVICE — NEEDLE SPNL 18GA L3.5IN W/ QNCKE SHARPER BVL DURA CLICK

## (undated) DEVICE — TRAY PREP DRY W/ PREM GLV 2 APPL 6 SPNG 2 UNDPD 1 OVERWRAP

## (undated) DEVICE — TUBE ET 7.5MM NSL ORAL BASIC CUF INTMED MURPHY EYE RADPQ

## (undated) DEVICE — GAUZE,SPONGE,4"X4",8PLY,STRL,LF,10/TRAY: Brand: MEDLINE

## (undated) DEVICE — BLADE SURG NO11 C STL RETRCT DISPOSABLE

## (undated) DEVICE — GLOVE SURG SZ 75 CRM LTX FREE POLYISOPRENE POLYMER BEAD ANTI

## (undated) DEVICE — DOVER HYDROGEL COATED LATEX FOLEY CATHETER, 5 ML, 3-WAY 22 FR/CH (7.3 MM): Brand: DOVER

## (undated) DEVICE — SEAL ENDO INSTR SELF SEAL UROLOGY

## (undated) DEVICE — SYRINGE MED 10ML LUERLOCK TIP W/O SFTY DISP

## (undated) DEVICE — Z INACTIVE USE 2660664 SOLUTION IRRIG 3000ML 0.9% SOD CHL USP UROMATIC PLAS CONT

## (undated) DEVICE — CATHETER URIN 22FR L16IN BAL 5CC NAT TWO W HYDRGEL COAT FOL

## (undated) DEVICE — DRAPE, LAVH, STERILE: Brand: MEDLINE

## (undated) DEVICE — LARYNGOSCOPE EXAM MAC 2 ALL MTL BLDE VIVID LED AND HNDL DISP

## (undated) DEVICE — INTRODUCER CATH 16FR ORNG SUPRPUB PLAS SHRP TIP BVL TRCR

## (undated) DEVICE — CONTAINER,SPECIMEN,OR STERILE,4OZ: Brand: MEDLINE

## (undated) DEVICE — DRAINBAG,ANTI-REFLUX TOWER,L/F,2000ML,LL: Brand: MEDLINE

## (undated) DEVICE — PITCHER PT 1200ML W HNDL CSR WRP

## (undated) DEVICE — PLUG,CATHETER,DRAINAGE PROTECTOR,TUBE: Brand: MEDLINE

## (undated) DEVICE — EVACUATOR URO BLDR W/ ADPT UROVAC

## (undated) DEVICE — SYRINGE CATH TIP 50ML

## (undated) DEVICE — Z DISCONTINUED BY MEDLINE USE 2733855 TRAY SKIN SCRB VAG PVP-I

## (undated) DEVICE — NEEDLE SPINAL 22GA L3.5IN SPINOCAN

## (undated) DEVICE — STERILE LATEX POWDER FREE SURGICAL GLOVES WITH HYDROGEL COATING: Brand: PROTEXIS

## (undated) DEVICE — PAD,EYE,1-5/8X2 5/8,STERILE,LF,1/PK: Brand: MEDLINE

## (undated) DEVICE — ELECTRODE,CUTTING,STERILE.24FR: Brand: N.A.

## (undated) DEVICE — TOTAL TRAY, 16FR 10ML SIL FOLEY, URN: Brand: MEDLINE

## (undated) DEVICE — GUIDEWIRE ENDOSCP L150CM DIA0.035IN TIP 3CM PTFE NIT

## (undated) DEVICE — CATHETER URET 5FR L70CM OPN END SGL LUMN INJ HUB FLEXIMA

## (undated) DEVICE — TRAY,IRRIGATION,PISTON SYRINGE,60ML: Brand: MEDLINE

## (undated) DEVICE — GAUZE,SPONGE,8"X4",12PLY,XRAY,STRL,LF: Brand: MEDLINE

## (undated) DEVICE — GOWN,PREVENTION PLUS,XL,ST,24/CS: Brand: MEDLINE

## (undated) DEVICE — 1621G LTX CATH 5CC 2-WAY 20FR: Brand: DOVER

## (undated) DEVICE — GLOVE SURG SZ 7 L12IN FNGR THK79MIL GRN LTX FREE